# Patient Record
Sex: FEMALE | Race: WHITE | NOT HISPANIC OR LATINO
[De-identification: names, ages, dates, MRNs, and addresses within clinical notes are randomized per-mention and may not be internally consistent; named-entity substitution may affect disease eponyms.]

---

## 2022-03-23 ENCOUNTER — APPOINTMENT (OUTPATIENT)
Dept: GASTROENTEROLOGY | Facility: CLINIC | Age: 62
End: 2022-03-23

## 2022-04-11 PROBLEM — Z00.00 ENCOUNTER FOR PREVENTIVE HEALTH EXAMINATION: Status: ACTIVE | Noted: 2022-04-11

## 2022-04-12 ENCOUNTER — APPOINTMENT (OUTPATIENT)
Dept: ORTHOPEDIC SURGERY | Facility: CLINIC | Age: 62
End: 2022-04-12
Payer: COMMERCIAL

## 2022-04-12 VITALS
DIASTOLIC BLOOD PRESSURE: 97 MMHG | HEIGHT: 65 IN | WEIGHT: 210 LBS | HEART RATE: 93 BPM | SYSTOLIC BLOOD PRESSURE: 148 MMHG | BODY MASS INDEX: 34.99 KG/M2

## 2022-04-12 DIAGNOSIS — Z82.0 FAMILY HISTORY OF EPILEPSY AND OTHER DISEASES OF THE NERVOUS SYSTEM: ICD-10-CM

## 2022-04-12 DIAGNOSIS — Z86.79 PERSONAL HISTORY OF OTHER DISEASES OF THE CIRCULATORY SYSTEM: ICD-10-CM

## 2022-04-12 DIAGNOSIS — Z80.9 FAMILY HISTORY OF MALIGNANT NEOPLASM, UNSPECIFIED: ICD-10-CM

## 2022-04-12 DIAGNOSIS — Z87.891 PERSONAL HISTORY OF NICOTINE DEPENDENCE: ICD-10-CM

## 2022-04-12 DIAGNOSIS — Z78.9 OTHER SPECIFIED HEALTH STATUS: ICD-10-CM

## 2022-04-12 DIAGNOSIS — Z82.61 FAMILY HISTORY OF ARTHRITIS: ICD-10-CM

## 2022-04-12 PROCEDURE — 73502 X-RAY EXAM HIP UNI 2-3 VIEWS: CPT | Mod: RT

## 2022-04-12 PROCEDURE — 99205 OFFICE O/P NEW HI 60 MIN: CPT

## 2022-04-12 RX ORDER — DICLOFENAC SODIUM 0.1 %
0.1 DROPS OPHTHALMIC (EYE)
Refills: 0 | Status: ACTIVE | COMMUNITY

## 2022-04-12 RX ORDER — TOPIRAMATE 50 MG/1
TABLET, COATED ORAL
Refills: 0 | Status: ACTIVE | COMMUNITY

## 2022-04-12 RX ORDER — LOSARTAN POTASSIUM 100 MG/1
TABLET, FILM COATED ORAL
Refills: 0 | Status: ACTIVE | COMMUNITY

## 2022-04-12 RX ORDER — DICLOFENAC 35 MG/1
CAPSULE ORAL
Refills: 0 | Status: ACTIVE | COMMUNITY

## 2022-04-13 ENCOUNTER — OUTPATIENT (OUTPATIENT)
Dept: OUTPATIENT SERVICES | Facility: HOSPITAL | Age: 62
LOS: 1 days | End: 2022-04-13
Payer: COMMERCIAL

## 2022-04-13 ENCOUNTER — APPOINTMENT (OUTPATIENT)
Dept: ULTRASOUND IMAGING | Facility: CLINIC | Age: 62
End: 2022-04-13
Payer: COMMERCIAL

## 2022-04-13 DIAGNOSIS — T84.019A BROKEN INTERNAL JOINT PROSTHESIS, UNSPECIFIED SITE, INITIAL ENCOUNTER: ICD-10-CM

## 2022-04-13 DIAGNOSIS — T84.010A BROKEN INTERNAL RIGHT HIP PROSTHESIS, INITIAL ENCOUNTER: ICD-10-CM

## 2022-04-13 PROCEDURE — 76942 ECHO GUIDE FOR BIOPSY: CPT | Mod: 26

## 2022-04-13 PROCEDURE — 76942 ECHO GUIDE FOR BIOPSY: CPT

## 2022-04-19 ENCOUNTER — OUTPATIENT (OUTPATIENT)
Dept: OUTPATIENT SERVICES | Facility: HOSPITAL | Age: 62
LOS: 1 days | End: 2022-04-19
Payer: COMMERCIAL

## 2022-04-19 VITALS
HEART RATE: 77 BPM | RESPIRATION RATE: 20 BRPM | SYSTOLIC BLOOD PRESSURE: 133 MMHG | WEIGHT: 205.03 LBS | OXYGEN SATURATION: 97 % | HEIGHT: 62 IN | TEMPERATURE: 98 F | DIASTOLIC BLOOD PRESSURE: 93 MMHG

## 2022-04-19 DIAGNOSIS — Z29.9 ENCOUNTER FOR PROPHYLACTIC MEASURES, UNSPECIFIED: ICD-10-CM

## 2022-04-19 DIAGNOSIS — Z98.890 OTHER SPECIFIED POSTPROCEDURAL STATES: Chronic | ICD-10-CM

## 2022-04-19 DIAGNOSIS — Z98.891 HISTORY OF UTERINE SCAR FROM PREVIOUS SURGERY: Chronic | ICD-10-CM

## 2022-04-19 DIAGNOSIS — T84.010A BROKEN INTERNAL RIGHT HIP PROSTHESIS, INITIAL ENCOUNTER: ICD-10-CM

## 2022-04-19 DIAGNOSIS — Z01.818 ENCOUNTER FOR OTHER PREPROCEDURAL EXAMINATION: ICD-10-CM

## 2022-04-19 DIAGNOSIS — Z96.641 PRESENCE OF RIGHT ARTIFICIAL HIP JOINT: Chronic | ICD-10-CM

## 2022-04-19 LAB
A1C WITH ESTIMATED AVERAGE GLUCOSE RESULT: 5.6 % — SIGNIFICANT CHANGE UP (ref 4–5.6)
ALBUMIN SERPL ELPH-MCNC: 4.5 G/DL — SIGNIFICANT CHANGE UP (ref 3.3–5.2)
ALP SERPL-CCNC: 117 U/L — SIGNIFICANT CHANGE UP (ref 40–120)
ALT FLD-CCNC: 25 U/L — SIGNIFICANT CHANGE UP
ANION GAP SERPL CALC-SCNC: 17 MMOL/L — SIGNIFICANT CHANGE UP (ref 5–17)
APTT BLD: 27.9 SEC — SIGNIFICANT CHANGE UP (ref 27.5–35.5)
AST SERPL-CCNC: 20 U/L — SIGNIFICANT CHANGE UP
BASOPHILS # BLD AUTO: 0.06 K/UL — SIGNIFICANT CHANGE UP (ref 0–0.2)
BASOPHILS NFR BLD AUTO: 0.7 % — SIGNIFICANT CHANGE UP (ref 0–2)
BILIRUB SERPL-MCNC: 0.4 MG/DL — SIGNIFICANT CHANGE UP (ref 0.4–2)
BLD GP AB SCN SERPL QL: SIGNIFICANT CHANGE UP
BUN SERPL-MCNC: 27.9 MG/DL — HIGH (ref 8–20)
CALCIUM SERPL-MCNC: 9.6 MG/DL — SIGNIFICANT CHANGE UP (ref 8.6–10.2)
CHLORIDE SERPL-SCNC: 101 MMOL/L — SIGNIFICANT CHANGE UP (ref 98–107)
CO2 SERPL-SCNC: 20 MMOL/L — LOW (ref 22–29)
CREAT SERPL-MCNC: 0.78 MG/DL — SIGNIFICANT CHANGE UP (ref 0.5–1.3)
EGFR: 86 ML/MIN/1.73M2 — SIGNIFICANT CHANGE UP
EOSINOPHIL # BLD AUTO: 0.1 K/UL — SIGNIFICANT CHANGE UP (ref 0–0.5)
EOSINOPHIL NFR BLD AUTO: 1.2 % — SIGNIFICANT CHANGE UP (ref 0–6)
ESTIMATED AVERAGE GLUCOSE: 114 MG/DL — SIGNIFICANT CHANGE UP (ref 68–114)
GLUCOSE SERPL-MCNC: 106 MG/DL — HIGH (ref 70–99)
HCT VFR BLD CALC: 43.7 % — SIGNIFICANT CHANGE UP (ref 34.5–45)
HGB BLD-MCNC: 14.6 G/DL — SIGNIFICANT CHANGE UP (ref 11.5–15.5)
IMM GRANULOCYTES NFR BLD AUTO: 0.4 % — SIGNIFICANT CHANGE UP (ref 0–1.5)
INR BLD: 0.99 RATIO — SIGNIFICANT CHANGE UP (ref 0.88–1.16)
LYMPHOCYTES # BLD AUTO: 1.84 K/UL — SIGNIFICANT CHANGE UP (ref 1–3.3)
LYMPHOCYTES # BLD AUTO: 21.9 % — SIGNIFICANT CHANGE UP (ref 13–44)
MCHC RBC-ENTMCNC: 31.1 PG — SIGNIFICANT CHANGE UP (ref 27–34)
MCHC RBC-ENTMCNC: 33.4 GM/DL — SIGNIFICANT CHANGE UP (ref 32–36)
MCV RBC AUTO: 93 FL — SIGNIFICANT CHANGE UP (ref 80–100)
MONOCYTES # BLD AUTO: 0.84 K/UL — SIGNIFICANT CHANGE UP (ref 0–0.9)
MONOCYTES NFR BLD AUTO: 10 % — SIGNIFICANT CHANGE UP (ref 2–14)
MRSA PCR RESULT.: SIGNIFICANT CHANGE UP
NEUTROPHILS # BLD AUTO: 5.54 K/UL — SIGNIFICANT CHANGE UP (ref 1.8–7.4)
NEUTROPHILS NFR BLD AUTO: 65.8 % — SIGNIFICANT CHANGE UP (ref 43–77)
PLATELET # BLD AUTO: 464 K/UL — HIGH (ref 150–400)
POTASSIUM SERPL-MCNC: 4.4 MMOL/L — SIGNIFICANT CHANGE UP (ref 3.5–5.3)
POTASSIUM SERPL-SCNC: 4.4 MMOL/L — SIGNIFICANT CHANGE UP (ref 3.5–5.3)
PROT SERPL-MCNC: 7.4 G/DL — SIGNIFICANT CHANGE UP (ref 6.6–8.7)
PROTHROM AB SERPL-ACNC: 11.5 SEC — SIGNIFICANT CHANGE UP (ref 10.5–13.4)
RBC # BLD: 4.7 M/UL — SIGNIFICANT CHANGE UP (ref 3.8–5.2)
RBC # FLD: 12.5 % — SIGNIFICANT CHANGE UP (ref 10.3–14.5)
S AUREUS DNA NOSE QL NAA+PROBE: SIGNIFICANT CHANGE UP
SODIUM SERPL-SCNC: 137 MMOL/L — SIGNIFICANT CHANGE UP (ref 135–145)
WBC # BLD: 8.41 K/UL — SIGNIFICANT CHANGE UP (ref 3.8–10.5)
WBC # FLD AUTO: 8.41 K/UL — SIGNIFICANT CHANGE UP (ref 3.8–10.5)

## 2022-04-19 PROCEDURE — 86850 RBC ANTIBODY SCREEN: CPT

## 2022-04-19 PROCEDURE — 85610 PROTHROMBIN TIME: CPT

## 2022-04-19 PROCEDURE — 86900 BLOOD TYPING SEROLOGIC ABO: CPT

## 2022-04-19 PROCEDURE — 93005 ELECTROCARDIOGRAM TRACING: CPT

## 2022-04-19 PROCEDURE — 87641 MR-STAPH DNA AMP PROBE: CPT

## 2022-04-19 PROCEDURE — G0463: CPT

## 2022-04-19 PROCEDURE — 85025 COMPLETE CBC W/AUTO DIFF WBC: CPT

## 2022-04-19 PROCEDURE — 80053 COMPREHEN METABOLIC PANEL: CPT

## 2022-04-19 PROCEDURE — 85730 THROMBOPLASTIN TIME PARTIAL: CPT

## 2022-04-19 PROCEDURE — 36415 COLL VENOUS BLD VENIPUNCTURE: CPT

## 2022-04-19 PROCEDURE — 83036 HEMOGLOBIN GLYCOSYLATED A1C: CPT

## 2022-04-19 PROCEDURE — 86901 BLOOD TYPING SEROLOGIC RH(D): CPT

## 2022-04-19 PROCEDURE — 93010 ELECTROCARDIOGRAM REPORT: CPT

## 2022-04-19 PROCEDURE — 87640 STAPH A DNA AMP PROBE: CPT

## 2022-04-19 NOTE — H&P PST ADULT - ANESTHESIA, PREVIOUS REACTION, PROFILE
reports mother also had n/v/nausea/vomiting reports mother also had n/v after anesthesia/nausea/vomiting

## 2022-04-19 NOTE — H&P PST ADULT - PROBLEM SELECTOR PLAN 3
preop assessment, medical clearance pending, revision right total hip replacement w/Dr Byrnes scheduled for 5/2/2022

## 2022-04-19 NOTE — ADDENDUM
[FreeTextEntry1] : This note was authored by Ruperto Merino working as a medical scribe for Dr. Johan Byrnes. The note was reviewed, edited, and revised by Dr. Johan Byrnes whom is in agreement with the exam findings, imaging findings, and treatment plan. 04/12/2022		\par \par Patient will  nee a wheelchair to improve ability to participate in [facility] and must use this on a regular basis at home. The patients mobility limitation cannot be sufficiently resolved using an appropriately fitted cane or waker.

## 2022-04-19 NOTE — H&P PST ADULT - PROBLEM SELECTOR PLAN 2
preop assessment, medical clearance pending, revision right total hip replacement w/Dr Byrnes scheduled for 5/2/2022. caprini score 8, high risk for dvt, SCD ordered, surgical team to assess for dvt prophylaxis

## 2022-04-19 NOTE — H&P PST ADULT - NSICDXPASTMEDICALHX_GEN_ALL_CORE_FT
PAST MEDICAL HISTORY:  Broken internal right hip prosthesis, initial encounter     Failure of right total hip arthroplasty, initial encounter     Hypertension     OA (osteoarthritis)      PAST MEDICAL HISTORY:  Broken internal right hip prosthesis, initial encounter     Failure of right total hip arthroplasty, initial encounter     Hypertension     Mild asthma     OA (osteoarthritis)     JOSE LUIS (obstructive sleep apnea)

## 2022-04-19 NOTE — H&P PST ADULT - NSICDXPASTSURGICALHX_GEN_ALL_CORE_FT
PAST SURGICAL HISTORY:  H/O  section     History of bunionectomy     History of total right hip replacement 2017 at [facility]    S/P excision of lipoma      PAST SURGICAL HISTORY:  H/O  section     History of bunionectomy right foot     History of total right hip replacement 2017 at [facility]    S/P excision of lipoma [id number]

## 2022-04-19 NOTE — H&P PST ADULT - MUSCULOSKELETAL
right hip/decreased ROM due to pain details… detailed exam right hip/no joint erythema/no joint warmth/no calf tenderness/decreased ROM due to pain

## 2022-04-19 NOTE — H&P PST ADULT - HISTORY OF PRESENT ILLNESS
60 yo F PMH of HTN, OA s/p right THR in 2017 at [facility] presents with c/o debilitating right hip pain for >3 weeks. She denies any pain prior to this. Patient denies any falls or trauma. She denies any out of the ordinary activity. Patient denies any fevers or chills. Approximately 3 weeks ago her pain was severe to the point of going to the hospital where CT and x-ray were performed. She was told to follow-up with an outpatient orthopedist who she saw and had blood work which was negative for infection. That orthopedist sent her for a ultrasound-guided psoas injection 1 week ago without significant improvement. Her hip pain is localized to the groin and she has been unable to walk since onset of pain. She is currently in a wheelchair. Imaging: X-ray AP of the pelvis and 2 views of the right hip demonstrate a right total hip arthroplasty with a fracture of the stem proximally at the proximal aspect of the body of the femoral component. Preop assessment prior to revision right total hip replacement w/Dr Byrnes scheduled for 2022.       Surgical History  History of Bunionectomy  History of  Section Low Transverse  History of Lipoma excision    Family History  Family history of arthritis (V17.7) (Z82.61) : Father  Family history of epilepsy (V17.2) (Z82.0) : Father  Family history of malignant neoplasm (V16.9) (Z80.9) : Mother     60 yo F PMH of HTN, JOSE LUIS (not on CPAP), mild asthma, obesity (BMI 37.5), OA s/p right THR in 2017 at [facility] presents with c/o debilitating right hip pain for >3 weeks. She denies any pain prior to this. Patient denies any falls or trauma. She denies any out of the ordinary activity. Patient denies any fevers or chills. Approximately 3 weeks ago her pain was severe to the point of going to the hospital where CT and x-ray were performed. She was told to follow-up with an outpatient orthopedist who she saw and had blood work which was negative for infection. That orthopedist sent her for a ultrasound-guided psoas injection 1 week ago without significant improvement. Her hip pain is localized to the groin and she has been unable to walk since onset of pain. She is currently in a wheelchair. Imaging: X-ray AP of the pelvis and 2 views of the right hip demonstrate a right total hip arthroplasty with a fracture of the stem proximally at the proximal aspect of the body of the femoral component. Preop assessment prior to revision right total hip replacement w/Dr Byrnes scheduled for 5/2/2022     62 yo F PMH of HTN, JOSE LUIS (not on CPAP), mild asthma, obesity (BMI 37.5), OA s/p right THR in 2017 at \[facility]"" presents with c/o debilitating right hip pain for >3 weeks. She denies any pain prior to this. Patient denies any falls, trauma, or any out of the ordinary activity. Patient denies any fevers or chills. Approximately 3 weeks ago her pain was severe to the point of going to the hospital where CT and x-ray were performed. She was told to follow-up with an outpatient orthopedist who she saw and had blood work which was negative for infection. That orthopedist sent her for a ultrasound-guided psoas injection 1 week ago without significant improvement. Her hip pain is localized to the groin and she has been unable to walk since onset of pain. She is currently in a wheelchair. Imaging: X-ray AP of the pelvis and 2 views of the right hip demonstrate a right total hip arthroplasty with a fracture of the stem proximally at the proximal aspect of the body of the femoral component. Preop assessment prior to revision right total hip replacement w/Dr Byrnes scheduled for 5/2/2022

## 2022-04-19 NOTE — H&P PST ADULT - ASSESSMENT
60 yo F s/p right THR in 2017 presents with c/o severe pain >3 weeks due to a fracture of the stem. Preop assessment prior to revision right total hip replacement w/Dr Byrnes scheduled for 2022. Patient will remain in a wheelchair prior to surgery and limit weightbearing to only toe-touch weightbearing with crutches or a walker when transferring from the wheelchair.     Pt was educated on preop preparation with written and verbal instructions. Pt was informed to obtain clearances >3 days before surgery. Pt will review medications with PCP. Pt was educated on NSAIDs, multivitamins and herbals that increase the risk of bleeding and need to be stopped 7 days before procedure. Pt was educated on covid testing and covid prevention, i.e. social distancing, handwashing, mask wearing. Pt verbalized understanding of the above.     OPIOID RISK TOOL    LETICIA EACH BOX THAT APPLIES AND ADD TOTALS AT THE END    FAMILY HISTORY OF SUBSTANCE ABUSE                 FEMALE         MALE                                                Alcohol                             [  ]1 pt          [  ]3pts                                               Illegal Durgs                     [  ]2 pts        [  ]3pts                                               Rx Drugs                           [  ]4 pts        [  ]4 pts    PERSONAL HISTORY OF SUBSTANCE ABUSE                                                                                          Alcohol                             [  ]3 pts       [  ]3 pts                                               Illegal Drugs                     [  ]4 pts        [  ]4 pts                                               Rx Drugs                           [  ]5 pts        [  ]5 pts    AGE BETWEEN 16-45 YEARS                                      [  ]1 pt         [  ]1 pt    HISTORY OF PREADOLESCENT   SEXUAL ABUSE                                                             [  ]3 pts        [  ]0pts    PSYCHOLOGICAL DISEASE                     ADD, OCD, Bipolar, Schizophrenia        [  ]2 pts         [  ]2 pts                      Depression                                               [  ]1 pt           [  ]1 pt           SCORING TOTAL   (add numbers and type here)              ( 0 )                                     A score of 3 or lower indicated LOW risk for future opioid abuse  A score of 4 to 7 indicated moderate risk for future opioid abuse  A score of 8 or higher indicates a high risk for opioid abuse    HEIDII VTE 2.0 SCORE [CLOT updated 2019]    AGE RELATED RISK FACTORS                                                       MOBILITY RELATED FACTORS  [ ] Age 41-60 years                                            (1 Point)                    [ ] Bed rest                                                        (1 Point)  [x ] Age: 61-74 years                                           (2 Points)                  [ ] Plaster cast                                                   (2 Points)  [ ] Age= 75 years                                              (3 Points)                    [ ] Bed bound for more than 72 hours                 (2 Points)    DISEASE RELATED RISK FACTORS                                               GENDER SPECIFIC FACTORS  [ ] Edema in the lower extremities                       (1 Point)              [ ] Pregnancy                                                     (1 Point)  [ ] Varicose veins                                               (1 Point)                     [ ] Post-partum < 6 weeks                                   (1 Point)             [ x] BMI > 25 Kg/m2                                            (1 Point)                     [ ] Hormonal therapy  or oral contraception          (1 Point)                 [ ] Sepsis (in the previous month)                        (1 Point)               [ ] History of pregnancy complications                 (1 point)  [ ] Pneumonia or serious lung disease                                               [ ] Unexplained or recurrent                     (1 Point)           (in the previous month)                               (1 Point)  [ ] Abnormal pulmonary function test                     (1 Point)                 SURGERY RELATED RISK FACTORS  [ ] Acute myocardial infarction                              (1 Point)               [ ]  Section                                             (1 Point)  [ ] Congestive heart failure (in the previous month)  (1 Point)      [ ] Minor surgery                                                  (1 Point)   [ ] Inflammatory bowel disease                             (1 Point)               [ ] Arthroscopic surgery                                        (2 Points)  [ ] Central venous access                                      (2 Points)                [ ] General surgery lasting more than 45 minutes (2 points)  [ ] Malignancy- Present or previous                   (2 Points)                [x ] Elective arthroplasty                                         (5 points)    [ ] Stroke (in the previous month)                          (5 Points)                                                                                                                                                           HEMATOLOGY RELATED FACTORS                                                 TRAUMA RELATED RISK FACTORS  [ ] Prior episodes of VTE                                     (3 Points)                [ ] Fracture of the hip, pelvis, or leg                       (5 Points)  [ ] Positive family history for VTE                         (3 Points)             [ ] Acute spinal cord injury (in the previous month)  (5 Points)  [ ] Prothrombin 21129 A                                     (3 Points)               [ ] Paralysis  (less than 1 month)                             (5 Points)  [ ] Factor V Leiden                                             (3 Points)                  [ ] Multiple Trauma within 1 month                        (5 Points)  [ ] Lupus anticoagulants                                     (3 Points)                                                           [ ] Anticardiolipin antibodies                               (3 Points)                                                       [ ] High homocysteine in the blood                      (3 Points)                                             [ ] Other congenital or acquired thrombophilia      (3 Points)                                                [ ] Heparin induced thrombocytopenia                  (3 Points)                                     Total Score [   8       ] 60 yo F s/p right THR in 2017 presents with c/o severe pain >3 weeks due to a fracture of the stem. Preop assessment prior to revision right total hip replacement w/Dr Byrnes scheduled for 2022. Patient will remain in a wheelchair prior to surgery and limit weightbearing to only toe-touch weightbearing with crutches or a walker when transferring from the wheelchair.     Pt was educated on preop preparation with written and verbal instructions. Pt was informed to obtain clearance >3 days before surgery. Pt was educated on NSAIDs, multivitamins and herbals that increase the risk of bleeding and need to be stopped 7 days before procedure. Pt was educated on covid testing and covid prevention, i.e. social distancing, handwashing, mask wearing. Pt verbalized understanding of the above.     OPIOID RISK TOOL    LETICIA EACH BOX THAT APPLIES AND ADD TOTALS AT THE END    FAMILY HISTORY OF SUBSTANCE ABUSE                 FEMALE         MALE                                                Alcohol                             [  ]1 pt          [  ]3pts                                               Illegal Durgs                     [  ]2 pts        [  ]3pts                                               Rx Drugs                           [  ]4 pts        [  ]4 pts    PERSONAL HISTORY OF SUBSTANCE ABUSE                                                                                          Alcohol                             [  ]3 pts       [  ]3 pts                                               Illegal Drugs                     [  ]4 pts        [  ]4 pts                                               Rx Drugs                           [  ]5 pts        [  ]5 pts    AGE BETWEEN 16-45 YEARS                                      [  ]1 pt         [  ]1 pt    HISTORY OF PREADOLESCENT   SEXUAL ABUSE                                                             [  ]3 pts        [  ]0pts    PSYCHOLOGICAL DISEASE                     ADD, OCD, Bipolar, Schizophrenia        [  ]2 pts         [  ]2 pts                      Depression                                               [  ]1 pt           [  ]1 pt           SCORING TOTAL   (add numbers and type here)              ( 0 )                                     A score of 3 or lower indicated LOW risk for future opioid abuse  A score of 4 to 7 indicated moderate risk for future opioid abuse  A score of 8 or higher indicates a high risk for opioid abuse    HEIDII VTE 2.0 SCORE [CLOT updated 2019]    AGE RELATED RISK FACTORS                                                       MOBILITY RELATED FACTORS  [ ] Age 41-60 years                                            (1 Point)                    [ ] Bed rest                                                        (1 Point)  [x ] Age: 61-74 years                                           (2 Points)                  [ ] Plaster cast                                                   (2 Points)  [ ] Age= 75 years                                              (3 Points)                    [ ] Bed bound for more than 72 hours                 (2 Points)    DISEASE RELATED RISK FACTORS                                               GENDER SPECIFIC FACTORS  [ ] Edema in the lower extremities                       (1 Point)              [ ] Pregnancy                                                     (1 Point)  [ ] Varicose veins                                               (1 Point)                     [ ] Post-partum < 6 weeks                                   (1 Point)             [ x] BMI > 25 Kg/m2                                            (1 Point)                     [ ] Hormonal therapy  or oral contraception          (1 Point)                 [ ] Sepsis (in the previous month)                        (1 Point)               [ ] History of pregnancy complications                 (1 point)  [ ] Pneumonia or serious lung disease                                               [ ] Unexplained or recurrent                     (1 Point)           (in the previous month)                               (1 Point)  [ ] Abnormal pulmonary function test                     (1 Point)                 SURGERY RELATED RISK FACTORS  [ ] Acute myocardial infarction                              (1 Point)               [ ]  Section                                             (1 Point)  [ ] Congestive heart failure (in the previous month)  (1 Point)      [ ] Minor surgery                                                  (1 Point)   [ ] Inflammatory bowel disease                             (1 Point)               [ ] Arthroscopic surgery                                        (2 Points)  [ ] Central venous access                                      (2 Points)                [ ] General surgery lasting more than 45 minutes (2 points)  [ ] Malignancy- Present or previous                   (2 Points)                [x ] Elective arthroplasty                                         (5 points)    [ ] Stroke (in the previous month)                          (5 Points)                                                                                                                                                           HEMATOLOGY RELATED FACTORS                                                 TRAUMA RELATED RISK FACTORS  [ ] Prior episodes of VTE                                     (3 Points)                [ ] Fracture of the hip, pelvis, or leg                       (5 Points)  [ ] Positive family history for VTE                         (3 Points)             [ ] Acute spinal cord injury (in the previous month)  (5 Points)  [ ] Prothrombin 83695 A                                     (3 Points)               [ ] Paralysis  (less than 1 month)                             (5 Points)  [ ] Factor V Leiden                                             (3 Points)                  [ ] Multiple Trauma within 1 month                        (5 Points)  [ ] Lupus anticoagulants                                     (3 Points)                                                           [ ] Anticardiolipin antibodies                               (3 Points)                                                       [ ] High homocysteine in the blood                      (3 Points)                                             [ ] Other congenital or acquired thrombophilia      (3 Points)                                                [ ] Heparin induced thrombocytopenia                  (3 Points)                                     Total Score [   8       ]

## 2022-04-19 NOTE — H&P PST ADULT - ENDOCRINE
Duration Of Freeze Thaw-Cycle (Seconds): 0
Post-Care Instructions: I reviewed with the patient in detail post-care instructions. Patient is to wear sunprotection, and avoid picking at any of the treated lesions. Pt may apply Vaseline to crusted or scabbing areas.
Detail Level: Zone
Render Post-Care Instructions In Note?: no
Consent: The patient's consent was obtained including but not limited to risks of crusting, scabbing, blistering, scarring, darker or lighter pigmentary change, recurrence, incomplete removal and infection.
negative

## 2022-04-19 NOTE — H&P PST ADULT - PROBLEM SELECTOR PLAN 1
caprini score 8, high risk for dvt, SCD ordered, surgical team to assess for dvt prophylaxis preop assessment, medical clearance pending, revision right total hip replacement w/Dr Byrnes scheduled for 5/2/2022

## 2022-04-19 NOTE — HISTORY OF PRESENT ILLNESS
[de-identified] : Ms. SUSY CHOUDHURY is a 61 year old female presenting for evaluation of 18 days of debilitating right hip pain, status post right hip replacement in 2017 with Dr El at [facility].  She denies any pain prior to this.  Patient denies any falls or trauma.  She denies any out of the ordinary activity.  Patient denies any fevers or chills.  Approximately 18 days ago pain was severe to the point of going to the hospital where CT and x-ray were performed.  She was told to follow-up with an outpatient orthopedist who she saw and had blood work which was negative for infection.  That orthopedist sent her for a ultrasound-guided psoas injection 1 week ago without significant improvement.  Her hip pain is localized to the groin and she has been unable to walk since onset of pain.  She is currently in a wheelchair.\par Patient denies any significant medical history other than hypertension.    On 3/31 esr=14 crp=1.2

## 2022-04-19 NOTE — H&P PST ADULT - NSICDXFAMILYHX_GEN_ALL_CORE_FT
FAMILY HISTORY:  Father  Still living? Unknown  FH: arthritis, Age at diagnosis: Age Unknown  FH: epilepsy, Age at diagnosis: Age Unknown    Mother  Still living? Unknown  FHx: malignant neoplasm, Age at diagnosis: Age Unknown     FAMILY HISTORY:  Father  Still living? Unknown  FH: arthritis, Age at diagnosis: Age Unknown  FH: epilepsy, Age at diagnosis: Age Unknown    Mother  Still living? Unknown  FH: hypertension, Age at diagnosis: Age Unknown  FHx: malignant neoplasm, Age at diagnosis: Age Unknown    Grandparent  Still living? Unknown  FH: diabetes mellitus, Age at diagnosis: Age Unknown

## 2022-04-19 NOTE — PHYSICAL EXAM
[de-identified] : The patient appears well nourished  and in no apparent distress.  The patient is alert and oriented to person, place, and time.   Affect and mood appear normal. The head is normocephalic and atraumatic.  The eyes reveal normal sclera and extra ocular muscles are intact. The tongue is midline with no apparent lesions.  Skin shows normal turgor with no evidence of eczema or psoriasis.  No respiratory distress noted.  Sensation grossly intact.		  [de-identified] : Exam of the right hip shows a healed incision with no signs of infection. Range of motion was not assessed.\par 5/5 motor strength bilaterally distally. Sensation intact distally.		  [de-identified] :  X-ray: AP of the pelvis and 2 views of the right hip demonstrate a right total hip arthroplasty with a fracture of the stem proximally at the proximal aspect of the body of the femoral component.

## 2022-04-19 NOTE — DISCUSSION/SUMMARY
[de-identified] : SUSY CHOUDHURY is a 61 year female who presents s/p right THR with a broken stem.  The patient is indicated for revision right total hip replacement.  It appears that she has a modular femoral component and the body fracture.  One option would be to replace the body and maintain the distal stem which is well fixed or alternatively revise the entire femoral component.  This will be determined intraoperatively.  We will obtain her prior operative report.  Given that her CRP was slightly elevated I am going to have her hip aspirated preoperatively to rule out infection.  She also is at elevated risk of postoperative complication because of her elevated BMI.  At this point however due to the fractured stem surgery is indicated and cannot be postponed for weight optimization.\par \par  A discussion was had with the patient regarding a right revision total hip replacement. Anterior and posterior exposures were discussed. For this patient a posterior approach is appropriate. A long discussion was had with the patient as what the total joint replacement would entail. A model was used to demonstrate the operation and to discuss bearing surfaces of the implants. The hospitalization and rehabilitation were discussed. The use of perioperative antibiotics and DVT prophylaxis were discussed. The risks, benefits and alternatives to surgical intervention were discussed at length with the patient. Specific risks discussed included: infection, wound breakdown, numbness and damage to nerves, tendon, muscle, arteries or other blood vessels, and the possibility of leg length discrepancy. The possibility of recurrent pain, no improvement in pain and actual worsening of the pain were also mentioned in conversation with the patient. Medical complications related to the patient's general medical health including deep vein thrombosis, pulmonary embolism, heart attack, stroke, death and other complications from anesthesia were discussed as well. The patient was told that we will take steps to minimize these risks by using sterile technique, antibiotics and DVT prophylaxis when appropriate and following the patient postoperatively in the clinic setting to monitor progress. The benefits of surgery were discussed with the patient including the potential to improve the current clinical condition through operative intervention. Alternatives to surgical intervention include continued conservative management which may yield less than optimal results in this particular patient. All questions were answered to the satisfaction of the patient. Models were used as an educational tool. We did discuss implant choices and fixation, with shared decision making with the patient.		\par \par The patient will remain in a wheelchair prior to surgery and limit weightbearing to only toe-touch weightbearing with crutches or a walker when transferring from the wheelchair.  We will try to expedite scheduling surgery but we do need to wait until the aspiration results are completed to ensure no infection.

## 2022-05-01 ENCOUNTER — TRANSCRIPTION ENCOUNTER (OUTPATIENT)
Age: 62
End: 2022-05-01

## 2022-05-02 ENCOUNTER — RESULT REVIEW (OUTPATIENT)
Age: 62
End: 2022-05-02

## 2022-05-02 ENCOUNTER — INPATIENT (INPATIENT)
Facility: HOSPITAL | Age: 62
LOS: 1 days | Discharge: NURSING, REHAB OR LONG-TERM CARE | DRG: 467 | End: 2022-05-04
Attending: ORTHOPAEDIC SURGERY | Admitting: ORTHOPAEDIC SURGERY
Payer: COMMERCIAL

## 2022-05-02 ENCOUNTER — TRANSCRIPTION ENCOUNTER (OUTPATIENT)
Age: 62
End: 2022-05-02

## 2022-05-02 ENCOUNTER — APPOINTMENT (OUTPATIENT)
Dept: ORTHOPEDIC SURGERY | Facility: HOSPITAL | Age: 62
End: 2022-05-02

## 2022-05-02 VITALS
TEMPERATURE: 99 F | SYSTOLIC BLOOD PRESSURE: 129 MMHG | RESPIRATION RATE: 16 BRPM | HEIGHT: 62.01 IN | OXYGEN SATURATION: 100 % | HEART RATE: 85 BPM | WEIGHT: 205.03 LBS | DIASTOLIC BLOOD PRESSURE: 81 MMHG

## 2022-05-02 DIAGNOSIS — Z98.890 OTHER SPECIFIED POSTPROCEDURAL STATES: Chronic | ICD-10-CM

## 2022-05-02 DIAGNOSIS — T84.010A BROKEN INTERNAL RIGHT HIP PROSTHESIS, INITIAL ENCOUNTER: ICD-10-CM

## 2022-05-02 DIAGNOSIS — Z98.891 HISTORY OF UTERINE SCAR FROM PREVIOUS SURGERY: Chronic | ICD-10-CM

## 2022-05-02 DIAGNOSIS — Z96.641 PRESENCE OF RIGHT ARTIFICIAL HIP JOINT: Chronic | ICD-10-CM

## 2022-05-02 LAB
ABO RH CONFIRMATION: SIGNIFICANT CHANGE UP
GLUCOSE BLDC GLUCOMTR-MCNC: 154 MG/DL — HIGH (ref 70–99)
GLUCOSE BLDC GLUCOMTR-MCNC: 82 MG/DL — SIGNIFICANT CHANGE UP (ref 70–99)

## 2022-05-02 PROCEDURE — 88300 SURGICAL PATH GROSS: CPT | Mod: 26

## 2022-05-02 PROCEDURE — 73502 X-RAY EXAM HIP UNI 2-3 VIEWS: CPT | Mod: 26,RT,76

## 2022-05-02 PROCEDURE — 27134 REVISE HIP JOINT REPLACEMENT: CPT | Mod: AS,RT

## 2022-05-02 PROCEDURE — 27134 REVISE HIP JOINT REPLACEMENT: CPT | Mod: RT

## 2022-05-02 DEVICE — CABLE/SLV SET BEAD MED CABLE 2MM: Type: IMPLANTABLE DEVICE | Status: FUNCTIONAL

## 2022-05-02 DEVICE — HEAD FEM CER V40 36MM  PLUS 0MM: Type: IMPLANTABLE DEVICE | Status: FUNCTIONAL

## 2022-05-02 DEVICE — GUIDEWIRE RD REAMING W/BALL TIP 2.5: Type: IMPLANTABLE DEVICE | Status: FUNCTIONAL

## 2022-05-02 DEVICE — FEMORAL BONE CEMENT KIT: Type: IMPLANTABLE DEVICE | Status: FUNCTIONAL

## 2022-05-02 DEVICE — IMPLANTABLE DEVICE: Type: IMPLANTABLE DEVICE | Status: FUNCTIONAL

## 2022-05-02 DEVICE — LINER ACET TRIDENT X3 0 DEG 36MM D: Type: IMPLANTABLE DEVICE | Status: FUNCTIONAL

## 2022-05-02 DEVICE — WAX MONTAGE 5CC STRL: Type: IMPLANTABLE DEVICE | Status: FUNCTIONAL

## 2022-05-02 DEVICE — HIP STEM MODULAR 21MM PLUS 0MM CONE: Type: IMPLANTABLE DEVICE | Status: FUNCTIONAL

## 2022-05-02 DEVICE — BONE WAX: Type: IMPLANTABLE DEVICE | Status: FUNCTIONAL

## 2022-05-02 DEVICE — SLEEVE UNIV V40  PLUS  4MM ADPTR: Type: IMPLANTABLE DEVICE | Status: FUNCTIONAL

## 2022-05-02 RX ORDER — CEFAZOLIN SODIUM 1 G
2000 VIAL (EA) INJECTION
Refills: 0 | Status: COMPLETED | OUTPATIENT
Start: 2022-05-02 | End: 2022-05-03

## 2022-05-02 RX ORDER — HYDROMORPHONE HYDROCHLORIDE 2 MG/ML
0.5 INJECTION INTRAMUSCULAR; INTRAVENOUS; SUBCUTANEOUS
Refills: 0 | Status: DISCONTINUED | OUTPATIENT
Start: 2022-05-02 | End: 2022-05-04

## 2022-05-02 RX ORDER — SODIUM CHLORIDE 9 MG/ML
3 INJECTION INTRAMUSCULAR; INTRAVENOUS; SUBCUTANEOUS EVERY 8 HOURS
Refills: 0 | Status: DISCONTINUED | OUTPATIENT
Start: 2022-05-02 | End: 2022-05-02

## 2022-05-02 RX ORDER — SODIUM CHLORIDE 9 MG/ML
1000 INJECTION INTRAMUSCULAR; INTRAVENOUS; SUBCUTANEOUS
Refills: 0 | Status: DISCONTINUED | OUTPATIENT
Start: 2022-05-02 | End: 2022-05-04

## 2022-05-02 RX ORDER — HYDROCHLOROTHIAZIDE 25 MG
12.5 TABLET ORAL DAILY
Refills: 0 | Status: DISCONTINUED | OUTPATIENT
Start: 2022-05-04 | End: 2022-05-04

## 2022-05-02 RX ORDER — TRANEXAMIC ACID 100 MG/ML
1000 INJECTION, SOLUTION INTRAVENOUS ONCE
Refills: 0 | Status: DISCONTINUED | OUTPATIENT
Start: 2022-05-02 | End: 2022-05-02

## 2022-05-02 RX ORDER — SODIUM CHLORIDE 9 MG/ML
500 INJECTION INTRAMUSCULAR; INTRAVENOUS; SUBCUTANEOUS ONCE
Refills: 0 | Status: COMPLETED | OUTPATIENT
Start: 2022-05-02 | End: 2022-05-02

## 2022-05-02 RX ORDER — ACETAMINOPHEN 500 MG
975 TABLET ORAL ONCE
Refills: 0 | Status: COMPLETED | OUTPATIENT
Start: 2022-05-02 | End: 2022-05-02

## 2022-05-02 RX ORDER — ONDANSETRON 8 MG/1
4 TABLET, FILM COATED ORAL ONCE
Refills: 0 | Status: COMPLETED | OUTPATIENT
Start: 2022-05-02 | End: 2022-05-02

## 2022-05-02 RX ORDER — CELECOXIB 200 MG/1
200 CAPSULE ORAL EVERY 12 HOURS
Refills: 0 | Status: DISCONTINUED | OUTPATIENT
Start: 2022-05-04 | End: 2022-05-04

## 2022-05-02 RX ORDER — SENNA PLUS 8.6 MG/1
2 TABLET ORAL AT BEDTIME
Refills: 0 | Status: DISCONTINUED | OUTPATIENT
Start: 2022-05-02 | End: 2022-05-04

## 2022-05-02 RX ORDER — FENTANYL CITRATE 50 UG/ML
25 INJECTION INTRAVENOUS
Refills: 0 | Status: DISCONTINUED | OUTPATIENT
Start: 2022-05-02 | End: 2022-05-02

## 2022-05-02 RX ORDER — SODIUM CHLORIDE 9 MG/ML
1000 INJECTION, SOLUTION INTRAVENOUS
Refills: 0 | Status: DISCONTINUED | OUTPATIENT
Start: 2022-05-02 | End: 2022-05-02

## 2022-05-02 RX ORDER — OXYCODONE HYDROCHLORIDE 5 MG/1
5 TABLET ORAL
Refills: 0 | Status: DISCONTINUED | OUTPATIENT
Start: 2022-05-02 | End: 2022-05-03

## 2022-05-02 RX ORDER — CELECOXIB 200 MG/1
400 CAPSULE ORAL ONCE
Refills: 0 | Status: COMPLETED | OUTPATIENT
Start: 2022-05-02 | End: 2022-05-02

## 2022-05-02 RX ORDER — HYDROMORPHONE HYDROCHLORIDE 2 MG/ML
4 INJECTION INTRAMUSCULAR; INTRAVENOUS; SUBCUTANEOUS
Refills: 0 | Status: DISCONTINUED | OUTPATIENT
Start: 2022-05-02 | End: 2022-05-04

## 2022-05-02 RX ORDER — ONDANSETRON 8 MG/1
4 TABLET, FILM COATED ORAL EVERY 6 HOURS
Refills: 0 | Status: DISCONTINUED | OUTPATIENT
Start: 2022-05-02 | End: 2022-05-04

## 2022-05-02 RX ORDER — OXYCODONE HYDROCHLORIDE 5 MG/1
10 TABLET ORAL
Refills: 0 | Status: DISCONTINUED | OUTPATIENT
Start: 2022-05-02 | End: 2022-05-03

## 2022-05-02 RX ORDER — KETOROLAC TROMETHAMINE 30 MG/ML
15 SYRINGE (ML) INJECTION EVERY 6 HOURS
Refills: 0 | Status: DISCONTINUED | OUTPATIENT
Start: 2022-05-02 | End: 2022-05-03

## 2022-05-02 RX ORDER — LOSARTAN POTASSIUM 100 MG/1
50 TABLET, FILM COATED ORAL DAILY
Refills: 0 | Status: DISCONTINUED | OUTPATIENT
Start: 2022-05-04 | End: 2022-05-04

## 2022-05-02 RX ORDER — MAGNESIUM HYDROXIDE 400 MG/1
30 TABLET, CHEWABLE ORAL DAILY
Refills: 0 | Status: DISCONTINUED | OUTPATIENT
Start: 2022-05-02 | End: 2022-05-04

## 2022-05-02 RX ORDER — PANTOPRAZOLE SODIUM 20 MG/1
40 TABLET, DELAYED RELEASE ORAL
Refills: 0 | Status: DISCONTINUED | OUTPATIENT
Start: 2022-05-02 | End: 2022-05-04

## 2022-05-02 RX ORDER — POLYETHYLENE GLYCOL 3350 17 G/17G
17 POWDER, FOR SOLUTION ORAL AT BEDTIME
Refills: 0 | Status: DISCONTINUED | OUTPATIENT
Start: 2022-05-02 | End: 2022-05-04

## 2022-05-02 RX ORDER — APREPITANT 80 MG/1
40 CAPSULE ORAL ONCE
Refills: 0 | Status: COMPLETED | OUTPATIENT
Start: 2022-05-02 | End: 2022-05-02

## 2022-05-02 RX ORDER — CEFAZOLIN SODIUM 1 G
2000 VIAL (EA) INJECTION ONCE
Refills: 0 | Status: DISCONTINUED | OUTPATIENT
Start: 2022-05-02 | End: 2022-05-02

## 2022-05-02 RX ORDER — APIXABAN 2.5 MG/1
2.5 TABLET, FILM COATED ORAL
Refills: 0 | Status: DISCONTINUED | OUTPATIENT
Start: 2022-05-03 | End: 2022-05-04

## 2022-05-02 RX ORDER — ACETAMINOPHEN 500 MG
975 TABLET ORAL EVERY 8 HOURS
Refills: 0 | Status: DISCONTINUED | OUTPATIENT
Start: 2022-05-02 | End: 2022-05-04

## 2022-05-02 RX ADMIN — CELECOXIB 400 MILLIGRAM(S): 200 CAPSULE ORAL at 11:08

## 2022-05-02 RX ADMIN — Medication 975 MILLIGRAM(S): at 22:03

## 2022-05-02 RX ADMIN — FENTANYL CITRATE 25 MICROGRAM(S): 50 INJECTION INTRAVENOUS at 20:46

## 2022-05-02 RX ADMIN — SODIUM CHLORIDE 75 MILLILITER(S): 9 INJECTION, SOLUTION INTRAVENOUS at 21:44

## 2022-05-02 RX ADMIN — Medication 975 MILLIGRAM(S): at 11:08

## 2022-05-02 RX ADMIN — Medication 15 MILLIGRAM(S): at 23:10

## 2022-05-02 RX ADMIN — SODIUM CHLORIDE 150 MILLILITER(S): 9 INJECTION INTRAMUSCULAR; INTRAVENOUS; SUBCUTANEOUS at 22:03

## 2022-05-02 RX ADMIN — FENTANYL CITRATE 25 MICROGRAM(S): 50 INJECTION INTRAVENOUS at 20:44

## 2022-05-02 RX ADMIN — Medication 975 MILLIGRAM(S): at 22:07

## 2022-05-02 RX ADMIN — ONDANSETRON 4 MILLIGRAM(S): 8 TABLET, FILM COATED ORAL at 20:45

## 2022-05-02 RX ADMIN — FENTANYL CITRATE 25 MICROGRAM(S): 50 INJECTION INTRAVENOUS at 20:22

## 2022-05-02 RX ADMIN — APREPITANT 40 MILLIGRAM(S): 80 CAPSULE ORAL at 11:07

## 2022-05-02 RX ADMIN — SODIUM CHLORIDE 500 MILLILITER(S): 9 INJECTION INTRAMUSCULAR; INTRAVENOUS; SUBCUTANEOUS at 21:44

## 2022-05-02 RX ADMIN — Medication 100 MILLIGRAM(S): at 20:18

## 2022-05-02 RX ADMIN — Medication 15 MILLIGRAM(S): at 23:22

## 2022-05-02 RX ADMIN — SENNA PLUS 2 TABLET(S): 8.6 TABLET ORAL at 22:07

## 2022-05-02 RX ADMIN — POLYETHYLENE GLYCOL 3350 17 GRAM(S): 17 POWDER, FOR SOLUTION ORAL at 22:07

## 2022-05-02 RX ADMIN — FENTANYL CITRATE 25 MICROGRAM(S): 50 INJECTION INTRAVENOUS at 21:21

## 2022-05-02 NOTE — BRIEF OPERATIVE NOTE - NSICDXBRIEFPREOP_GEN_ALL_CORE_FT
PRE-OP DIAGNOSIS:  Broken internal right hip prosthesis, initial encounter 02-May-2022 19:10:35  Reyes Lam

## 2022-05-02 NOTE — DISCHARGE NOTE PROVIDER - NSDCMRMEDTOKEN_GEN_ALL_CORE_FT
losartan-hydrochlorothiazide 50 mg-12.5 mg oral tablet: 1 tab(s) orally once a day  topiramate: orally once a day   acetaminophen 325 mg oral tablet: 3 tab(s) orally every 8 hours  apixaban 2.5 mg oral tablet: 1 tab(s) orally 2 times a day  bisacodyl 10 mg rectal suppository: 1 suppository(ies) rectal once, As needed, Constipation  celecoxib 200 mg oral capsule: 1 cap(s) orally every 12 hours  cephalexin 500 mg oral capsule: 1 cap(s) orally 2 times a day for 1 week for infection prevention  losartan-hydrochlorothiazide 50 mg-12.5 mg oral tablet: 1 tab(s) orally once a day  magnesium hydroxide 8% oral suspension: 30 milliliter(s) orally once a day, As needed, Constipation  polyethylene glycol 3350 oral powder for reconstitution: 17 gram(s) orally once a day (at bedtime)  senna oral tablet: 2 tab(s) orally once a day (at bedtime)  topiramate: orally once a day  traMADol 50 mg oral tablet: 2 tab(s) orally every 6 hours, As needed, Moderate Pain (4 - 6)   acetaminophen 325 mg oral tablet: 3 tab(s) orally every 8 hours  apixaban 2.5 mg oral tablet: 1 tab(s) orally 2 times a day  bisacodyl 10 mg rectal suppository: 1 suppository(ies) rectal once, As needed, Constipation  celecoxib 200 mg oral capsule: 1 cap(s) orally every 12 hours  Duricef 500 mg oral capsule: 1 cap(s) orally every 12 hours for 1 week for infection prevention  losartan-hydrochlorothiazide 50 mg-12.5 mg oral tablet: 1 tab(s) orally once a day  magnesium hydroxide 8% oral suspension: 30 milliliter(s) orally once a day, As needed, Constipation  polyethylene glycol 3350 oral powder for reconstitution: 17 gram(s) orally once a day (at bedtime)  senna oral tablet: 2 tab(s) orally once a day (at bedtime)  topiramate: orally once a day  traMADol 50 mg oral tablet: 2 tab(s) orally every 6 hours, As needed, Moderate Pain (4 - 6)

## 2022-05-02 NOTE — BRIEF OPERATIVE NOTE - NSICDXBRIEFPOSTOP_GEN_ALL_CORE_FT
POST-OP DIAGNOSIS:  Broken internal right hip prosthesis, initial encounter 02-May-2022 19:10:40  Reyes Lam

## 2022-05-02 NOTE — DISCHARGE NOTE PROVIDER - NSDCCPCAREPLAN_GEN_ALL_CORE_FT
PRINCIPAL DISCHARGE DIAGNOSIS  Diagnosis: Failure of right total hip arthroplasty, initial encounter  Assessment and Plan of Treatment:

## 2022-05-02 NOTE — DISCHARGE NOTE PROVIDER - NSDCFUADDINST_GEN_ALL_CORE_FT
The patient will be seen in the office between 2-3 weeks for wound check.   **Your first post-operative visit has been scheduled prior to your admission. PLEASE CONTACT OFFICE TO CONFIRM THE APPOINTMENT DATE. Sutures/Staples/Tape will be removed at that time.  **  The silver based dressing is to be removed 7 days from the date of surgery.   ** CONTACT THE OFFICE IF THE FOLLOWING DEVELOP:  - the dressing becomes soiled or saturated  - you develop a fever greater that 101F  - the wound becomes red or you develop blistering around the wound  * Patient may shower after post-op day #3.   * The patient will continue home PT consistent with posterior total hip replacement protocol and will continue to practice posterior total hip precautions for a minimum of 6 week. Transition to outpatient PT will occur at the time of the first office visit.   * The patient is toe touch weight bearing of the right lower extremity.  *** The patient will continue ELIQUIS for blood clot prevention.   * The patient will take OXYCODONE AND TYLENOL for pain control and adjust according to prescription and patient needs. Contact the office if pain increases while taking prescribed pain medications or related concerns develop.  * Celebrex will be taken twice daily for 3 weeks for pain control and prevention of excessive bone growth. Additional prescription may be requested at your office follow-up visit.  * The patient will take Senna S while taking oxycodone to prevent narcotic associated constipation.  Additionally, increase water intake (drink at least 8 glasses of water daily) and try adding fiber to the diet by eating fruits, vegetables and foods that are rich in grains. If constipation is experienced, contact the medical/primary care provider to discuss further treatment options.  * To avoid injury at home:  - continue use of rolling walker until cleared by physical therapist  - have family or friend remove all throw rug or objects in hallways that may present a trip hazard.  - if you experience any dizziness or medical concerns, call your medical doctor or  911.  * The implant may activate metal detection devices.   * In addition, the patient will take Duricef (cefadroxil) 500mg twice daily x 7 days post op.  The patient will be seen in the office between 2-3 weeks for wound check.   **Your first post-operative visit has been scheduled prior to your admission. PLEASE CONTACT OFFICE TO CONFIRM THE APPOINTMENT DATE. Sutures/Staples/Tape will be removed at that time.  **  The prevena vac dressing is to be removed 7 days from the date of surgery and then tegaderm and gauze placed over incision.   ** CONTACT THE OFFICE IF THE FOLLOWING DEVELOP:  - the dressing becomes soiled or saturated  - you develop a fever greater that 101F  - the wound becomes red or you develop blistering around the wound  * Patient may shower after post-op day #3.   * The patient will continue home PT consistent with posterior total hip replacement protocol and will continue to practice posterior total hip precautions for a minimum of 6 week. Transition to outpatient PT will occur at the time of the first office visit.   * The patient is toe touch weight bearing of the right lower extremity.  *** The patient will continue ELIQUIS for blood clot prevention.   * The patient will take OXYCODONE AND TYLENOL for pain control and adjust according to prescription and patient needs. Contact the office if pain increases while taking prescribed pain medications or related concerns develop.  * Celebrex will be taken twice daily for 3 weeks for pain control and prevention of excessive bone growth. Additional prescription may be requested at your office follow-up visit.  * The patient will take Senna S while taking oxycodone to prevent narcotic associated constipation.  Additionally, increase water intake (drink at least 8 glasses of water daily) and try adding fiber to the diet by eating fruits, vegetables and foods that are rich in grains. If constipation is experienced, contact the medical/primary care provider to discuss further treatment options.  * To avoid injury at home:  - continue use of rolling walker until cleared by physical therapist  - have family or friend remove all throw rug or objects in hallways that may present a trip hazard.  - if you experience any dizziness or medical concerns, call your medical doctor or  911.  * The implant may activate metal detection devices.   * In addition, the patient will take Duricef (cefadroxil) 500mg twice daily x 7 days post op.  The patient will be seen in the office between 2-3 weeks for wound check.   **Your first post-operative visit has been scheduled prior to your admission. PLEASE CONTACT OFFICE TO CONFIRM THE APPOINTMENT DATE. Sutures/Staples/Tape will be removed at that time.  **  The prevena vac dressing is to be removed 7 days from the date of surgery and then tegaderm and gauze placed over incision.   ** CONTACT THE OFFICE IF THE FOLLOWING DEVELOP:  - the dressing becomes soiled or saturated  - you develop a fever greater that 101F  - the wound becomes red or you develop blistering around the wound  * Patient may shower after post-op day #3.   * The patient will continue home PT consistent with posterior total hip replacement protocol and will continue to practice posterior total hip precautions for a minimum of 6 week. Transition to outpatient PT will occur at the time of the first office visit.   * The patient is toe touch weight bearing of the right lower extremity.  *** The patient will continue ELIQUIS 2.5 mg bid for blood clot prevention for 2 weeks, then ASA 325mg bid for the following 4 weeks.  * The patient will take TRAMADOL AND TYLENOL for pain control and adjust according to prescription and patient needs. Contact the office if pain increases while taking prescribed pain medications or related concerns develop.  * Celebrex will be taken twice daily for 3 weeks for pain control and prevention of excessive bone growth. Additional prescription may be requested at your office follow-up visit.  * The patient will take Senna S while taking oxycodone to prevent narcotic associated constipation.  Additionally, increase water intake (drink at least 8 glasses of water daily) and try adding fiber to the diet by eating fruits, vegetables and foods that are rich in grains. If constipation is experienced, contact the medical/primary care provider to discuss further treatment options.  * To avoid injury at home:  - continue use of rolling walker until cleared by physical therapist  - have family or friend remove all throw rug or objects in hallways that may present a trip hazard.  - if you experience any dizziness or medical concerns, call your medical doctor or  911.  * The implant may activate metal detection devices.   * In addition, the patient will take Duricef (cefadroxil) 500mg twice daily x 7 days post op.

## 2022-05-02 NOTE — DISCHARGE NOTE PROVIDER - CARE PROVIDER_API CALL
Johan Byrnes)  Orthopaedic Surgery  [address], [facility] [address]  [place] [address]  Phone: [phone]  Fax: [phone]  Follow Up Time:

## 2022-05-02 NOTE — PRE-OP CHECKLIST - LATEX ALLERGY
F: None  E: Replete K<4, Mg<2  N: DASH/TLC diet  DVT ppx: Lovenox 25mg subq BID  Code: FULL  Dispo: [facility] [place] no

## 2022-05-02 NOTE — DISCHARGE NOTE PROVIDER - NSDCFUSCHEDAPPT_GEN_ALL_CORE_FT
Johan Byrnes  [facility] Physician [facility]  [facility] [address]  Scheduled Appointment: 05/26/2022    Johan Byrnes  [facility] [address]  Scheduled Appointment: 06/17/2022

## 2022-05-02 NOTE — PATIENT PROFILE ADULT - FALL HARM RISK - HARM RISK INTERVENTIONS
Assistance with ambulation/Assistance OOB with selected safe patient handling equipment/Communicate Risk of Fall with Harm to all staff/Discuss with provider need for PT consult/Monitor gait and stability/Provide patient with walking aids - walker, cane, crutches/Reinforce activity limits and safety measures with patient and family/Sit up slowly, dangle for a short time, stand at bedside before walking/Tailored Fall Risk Interventions/Use of alarms - bed, chair and/or voice tab/Visual Cue: Yellow wristband and red socks/Bed in lowest position, wheels locked, appropriate side rails in place/Call bell, personal items and telephone in reach/Instruct patient to call for assistance before getting out of bed or chair/Non-slip footwear when patient is out of bed/[place] to call system/Physically safe environment - no spills, clutter or unnecessary equipment/Purposeful Proactive Rounding/Room/bathroom lighting operational, light cord in reach

## 2022-05-02 NOTE — DISCHARGE NOTE PROVIDER - HOSPITAL COURSE
The patient underwent a RIGHT POSTERIOR TOTAL HIP REVISION on 5/2/22. The patient received antibiotics consistent with SCIP guidelines. The patient underwent the procedure and had no intra-operative complications. Post-operatively, the patient was seen by medicine and PT. The patient received ELIQUIS for DVTP. The patient received pain medications per orthopedic pain management pathway and the pain was appropriately controlled. Patient was instructed on posterior total hip precautions by PT. The patient did not have any post-operative medical complications. The patient was discharged in stable condition.

## 2022-05-03 LAB
ANION GAP SERPL CALC-SCNC: 15 MMOL/L — SIGNIFICANT CHANGE UP (ref 5–17)
BUN SERPL-MCNC: 20.6 MG/DL — HIGH (ref 8–20)
CALCIUM SERPL-MCNC: 8.7 MG/DL — SIGNIFICANT CHANGE UP (ref 8.6–10.2)
CHLORIDE SERPL-SCNC: 107 MMOL/L — SIGNIFICANT CHANGE UP (ref 98–107)
CO2 SERPL-SCNC: 18 MMOL/L — LOW (ref 22–29)
CREAT SERPL-MCNC: 0.69 MG/DL — SIGNIFICANT CHANGE UP (ref 0.5–1.3)
EGFR: 98 ML/MIN/1.73M2 — SIGNIFICANT CHANGE UP
GLUCOSE SERPL-MCNC: 142 MG/DL — HIGH (ref 70–99)
HCT VFR BLD CALC: 36.9 % — SIGNIFICANT CHANGE UP (ref 34.5–45)
HGB BLD-MCNC: 11.9 G/DL — SIGNIFICANT CHANGE UP (ref 11.5–15.5)
MCHC RBC-ENTMCNC: 30.9 PG — SIGNIFICANT CHANGE UP (ref 27–34)
MCHC RBC-ENTMCNC: 32.2 GM/DL — SIGNIFICANT CHANGE UP (ref 32–36)
MCV RBC AUTO: 95.8 FL — SIGNIFICANT CHANGE UP (ref 80–100)
PLATELET # BLD AUTO: 228 K/UL — SIGNIFICANT CHANGE UP (ref 150–400)
POTASSIUM SERPL-MCNC: 4.3 MMOL/L — SIGNIFICANT CHANGE UP (ref 3.5–5.3)
POTASSIUM SERPL-SCNC: 4.3 MMOL/L — SIGNIFICANT CHANGE UP (ref 3.5–5.3)
RBC # BLD: 3.85 M/UL — SIGNIFICANT CHANGE UP (ref 3.8–5.2)
RBC # FLD: 12.3 % — SIGNIFICANT CHANGE UP (ref 10.3–14.5)
SARS-COV-2 RNA SPEC QL NAA+PROBE: SIGNIFICANT CHANGE UP
SODIUM SERPL-SCNC: 140 MMOL/L — SIGNIFICANT CHANGE UP (ref 135–145)
WBC # BLD: 11.54 K/UL — HIGH (ref 3.8–10.5)
WBC # FLD AUTO: 11.54 K/UL — HIGH (ref 3.8–10.5)

## 2022-05-03 PROCEDURE — 99222 1ST HOSP IP/OBS MODERATE 55: CPT

## 2022-05-03 RX ORDER — CEPHALEXIN 500 MG
500 CAPSULE ORAL EVERY 6 HOURS
Refills: 0 | Status: DISCONTINUED | OUTPATIENT
Start: 2022-05-03 | End: 2022-05-04

## 2022-05-03 RX ORDER — TRAMADOL HYDROCHLORIDE 50 MG/1
50 TABLET ORAL EVERY 4 HOURS
Refills: 0 | Status: DISCONTINUED | OUTPATIENT
Start: 2022-05-03 | End: 2022-05-04

## 2022-05-03 RX ORDER — TRAMADOL HYDROCHLORIDE 50 MG/1
100 TABLET ORAL EVERY 6 HOURS
Refills: 0 | Status: DISCONTINUED | OUTPATIENT
Start: 2022-05-03 | End: 2022-05-04

## 2022-05-03 RX ADMIN — Medication 975 MILLIGRAM(S): at 06:17

## 2022-05-03 RX ADMIN — Medication 15 MILLIGRAM(S): at 06:18

## 2022-05-03 RX ADMIN — Medication 975 MILLIGRAM(S): at 05:08

## 2022-05-03 RX ADMIN — PANTOPRAZOLE SODIUM 40 MILLIGRAM(S): 20 TABLET, DELAYED RELEASE ORAL at 05:09

## 2022-05-03 RX ADMIN — Medication 975 MILLIGRAM(S): at 12:05

## 2022-05-03 RX ADMIN — SENNA PLUS 2 TABLET(S): 8.6 TABLET ORAL at 21:08

## 2022-05-03 RX ADMIN — TRAMADOL HYDROCHLORIDE 100 MILLIGRAM(S): 50 TABLET ORAL at 15:46

## 2022-05-03 RX ADMIN — Medication 975 MILLIGRAM(S): at 21:06

## 2022-05-03 RX ADMIN — OXYCODONE HYDROCHLORIDE 10 MILLIGRAM(S): 5 TABLET ORAL at 05:09

## 2022-05-03 RX ADMIN — Medication 500 MILLIGRAM(S): at 23:13

## 2022-05-03 RX ADMIN — Medication 15 MILLIGRAM(S): at 12:05

## 2022-05-03 RX ADMIN — Medication 15 MILLIGRAM(S): at 18:44

## 2022-05-03 RX ADMIN — APIXABAN 2.5 MILLIGRAM(S): 2.5 TABLET, FILM COATED ORAL at 18:13

## 2022-05-03 RX ADMIN — OXYCODONE HYDROCHLORIDE 10 MILLIGRAM(S): 5 TABLET ORAL at 06:10

## 2022-05-03 RX ADMIN — Medication 500 MILLIGRAM(S): at 18:14

## 2022-05-03 RX ADMIN — Medication 975 MILLIGRAM(S): at 21:09

## 2022-05-03 RX ADMIN — Medication 975 MILLIGRAM(S): at 12:35

## 2022-05-03 RX ADMIN — POLYETHYLENE GLYCOL 3350 17 GRAM(S): 17 POWDER, FOR SOLUTION ORAL at 21:08

## 2022-05-03 RX ADMIN — Medication 15 MILLIGRAM(S): at 12:35

## 2022-05-03 RX ADMIN — Medication 15 MILLIGRAM(S): at 05:08

## 2022-05-03 RX ADMIN — Medication 15 MILLIGRAM(S): at 18:14

## 2022-05-03 RX ADMIN — TRAMADOL HYDROCHLORIDE 100 MILLIGRAM(S): 50 TABLET ORAL at 15:08

## 2022-05-03 RX ADMIN — APIXABAN 2.5 MILLIGRAM(S): 2.5 TABLET, FILM COATED ORAL at 05:09

## 2022-05-03 RX ADMIN — SODIUM CHLORIDE 150 MILLILITER(S): 9 INJECTION INTRAMUSCULAR; INTRAVENOUS; SUBCUTANEOUS at 05:08

## 2022-05-03 RX ADMIN — Medication 100 MILLIGRAM(S): at 05:12

## 2022-05-03 NOTE — CONSULT NOTE ADULT - SUBJECTIVE AND OBJECTIVE BOX
Patient is a 61y old  Female who is s/p R USHA revision POD#1 . Pain well controlled , c/o mild nausea , no vomiting ,   participating with physical therapy .     CC: R hip pain due to broken R hip  prosthesis , post op pain well controlled       HPI:  60 yo F PMH of HTN, JOSE LUIS (not on CPAP), mild asthma, obesity (BMI 37.5), OA s/p right THR in 2017 at [facility] presents with c/o debilitating right hip pain for >3 weeks. She denies any pain prior to this. Patient denies any falls, trauma, or any out of the ordinary activity. Patient denies any fevers or chills. Approximately 3 weeks ago her pain was severe to the point of going to the hospital where CT and x-ray were performed. She was told to follow-up with an outpatient orthopedist who she saw and had blood work which was negative for infection. That orthopedist sent her for a ultrasound-guided psoas injection 1 week ago without significant improvement. Her hip pain is localized to the groin and she has been unable to walk since onset of pain. She is currently in a wheelchair. Imaging: X-ray AP of the pelvis and 2 views of the right hip demonstrate a right total hip arthroplasty with a fracture of the stem proximally at the proximal aspect of the body of the femoral component.      PAST MEDICAL & SURGICAL HISTORY:  Failure of right total hip arthroplasty, initial encounter    Hypertension    OA (osteoarthritis)    Broken internal right hip prosthesis, initial encounter    JOSE LUIS (obstructive sleep apnea)    Mild asthma    History of bunionectomy  right foot     S/P excision of lipoma  [id number]    History of total right hip replacement  2017 at [facility]    H/O  section          Social History:  Tobacco - ex smoker , quit 12 yrs ago  ETOH - socially   Illicit drug abuse - denies    FAMILY HISTORY:  FHx: malignant neoplasm (Mother)  ovarian cancer    FH: epilepsy (Father)    FH: arthritis (Father)    FH: diabetes mellitus (Grandparent)    FH: hypertension (Mother)        Allergies    No Known Allergies    Intolerances    opioid-like analgesics (Vomiting; Nausea)      HOME MEDICATIONS :     REVIEW OF SYSTEMS:    R hip pain postop well controlled , mild nausea + , all other systems are   reviewed and are negative .     MEDICATIONS  (STANDING):  acetaminophen     Tablet .. 975 milliGRAM(s) Oral every 8 hours  apixaban 2.5 milliGRAM(s) Oral two times a day  cephalexin 500 milliGRAM(s) Oral every 6 hours  ketorolac   Injectable 15 milliGRAM(s) IV Push every 6 hours  pantoprazole    Tablet 40 milliGRAM(s) Oral before breakfast  polyethylene glycol 3350 17 Gram(s) Oral at bedtime  senna 2 Tablet(s) Oral at bedtime  sodium chloride 0.9%. 1000 milliLiter(s) (150 mL/Hr) IV Continuous <Continuous>    MEDICATIONS  (PRN):  HYDROmorphone   Tablet 4 milliGRAM(s) Oral every 3 hours PRN Severe Pain (7 - 10)  HYDROmorphone  Injectable 0.5 milliGRAM(s) IV Push every 3 hours PRN breakthru  magnesium hydroxide Suspension 30 milliLiter(s) Oral daily PRN Constipation  ondansetron Injectable 4 milliGRAM(s) IV Push every 6 hours PRN Nausea and/or Vomiting  traMADol 50 milliGRAM(s) Oral every 4 hours PRN Mild Pain (1 - 3)  traMADol 100 milliGRAM(s) Oral every 6 hours PRN Moderate Pain (4 - 6)      Vital Signs Last 24 Hrs  T(C): 36.5 (03 May 2022 04:43), Max: 36.9 (02 May 2022 22:08)  T(F): 97.7 (03 May 2022 04:43), Max: 98.4 (02 May 2022 22:08)  HR: 84 (03 May 2022 08:00) (75 - 105)  BP: 124/80 (03 May 2022 08:00) (105/65 - 135/87)  BP(mean): 79 (02 May 2022 21:38) (75 - 88)  RR: 18 (03 May 2022 08:00) (12 - 18)  SpO2: 99% (03 May 2022 08:00) (96% - 99%)    PHYSICAL EXAM:    GENERAL: NAD, well-groomed, well-developed  HEAD:  Atraumatic, Normocephalic  EYES: EOMI, PERRLA, conjunctiva and sclera clear  NECK: Supple, No JVD, Normal thyroid  NERVOUS SYSTEM:  Alert & Oriented X4 , no focal deficit   CHEST/LUNG: CTA  b/l,  no rales, rhonchi, wheezing, or rubs  HEART: Regular rate and rhythm; No murmurs, rubs, or gallops  ABDOMEN: Soft, Nontender, Nondistended; Bowel sounds present  EXTREMITIES:  2+ Peripheral Pulses, No clubbing, cyanosis, or edema ,   R hip Prevena dressing +   LYMPH: No lymphadenopathy noted  SKIN: No rashes or lesions    LABS:                        11.9   11.54 )-----------( 228      ( 03 May 2022 06:17 )             36.9         140  |  107  |  20.6<H>  ----------------------------<  142<H>  4.3   |  18.0<L>  |  0.69    Ca    8.7      03 May 2022 06:17    RADIOLOGY & ADDITIONAL STUDIES:    < from: Xray Hip 2-3 Views Intraoperative, Right (22 @ 18:19) >    ACC: [id number] EXAM:  XR HIP 2-3VRT INTR OP                          PROCEDURE DATE:  2022          INTERPRETATION:  HISTORY: RIGHT hip Hemiarthroplasty    TECHNIQUE: Intraoperative Two views of the left hip are submitted.    Findings: Both femoral and acetabular components are well-seated and in   anatomic alignment.  Cerclage wires in place.  There is no fracture. RIGHT    The visualized pelvis is within normal limits.    IMPRESSION:  Prosthetic Hip replacement in place...    --- End of Report ---    < end of copied text >

## 2022-05-03 NOTE — PHYSICAL THERAPY INITIAL EVALUATION ADULT - ACTIVE RANGE OF MOTION EXAMINATION, REHAB EVAL
Right LE THR posterior hip precautions maintained //bilateral upper extremity Active ROM was WFL (within functional limits)/bilateral  lower extremity Active ROM was WFL (within functional limits)/deficits as listed below

## 2022-05-03 NOTE — PHYSICAL THERAPY INITIAL EVALUATION ADULT - ADDITIONAL COMMENTS
Pt lives in an apartment with 0 steps to enter and 14  stairs inside with  rails.  Pt owns medical equipment: SAC   Pt lives with: Alone   Someone is always available to provide assist.

## 2022-05-03 NOTE — CONSULT NOTE ADULT - ASSESSMENT
62 yo F PMH of HTN, JOSE LUIS (not on CPAP), mild asthma, obesity (BMI 37.5), OA s/p right THR in 2017 at [facility] presents with c/o debilitating right hip pain for >3 weeks. Found to have R hip broken internal prosthesis  . Now s/p R USHA revision on 5/2.     1. R hip broken internal prosthesis , sp R USHA revision   PT/OT/pain mgmt  DVT prophylaxis- as per ortho  Abx as per SCIP  Incentive spirometry  Prophylaxis of opioid  induced constipation.  Wound care/ Prevena as per ortho team   On PO Abx as per ortho     2. HTN - continue home meds with parameters     3. Hx of JOSE LUIS - her old CPAP was recalled , got new one and did bring with her -   may use own     4. Hx of mild asthma - stable and patient is asymptomatic   Hx of Migraine- continue home dose of Topiramate     5 . Obesity - class 2 - BMI 37.5 - will benefit from diet / life style changes     6. Leucocytosis - no S/S of infection - likely reactive     7. DVT prophylaxis  - as per ortho protocol- on Eliquis   Opioid induced constipation  prophylaxis - bowel regimen   Thank you for the courtesy of this consult ,   will follow along with you .   Dispo plan is rehab - pending PMR eval   D/W ortho PA/ nurse/ SW/CM.   Will follow along with you .

## 2022-05-04 ENCOUNTER — TRANSCRIPTION ENCOUNTER (OUTPATIENT)
Age: 62
End: 2022-05-04

## 2022-05-04 VITALS
HEART RATE: 91 BPM | TEMPERATURE: 98 F | SYSTOLIC BLOOD PRESSURE: 137 MMHG | OXYGEN SATURATION: 95 % | DIASTOLIC BLOOD PRESSURE: 86 MMHG | RESPIRATION RATE: 18 BRPM

## 2022-05-04 LAB
ANION GAP SERPL CALC-SCNC: 10 MMOL/L — SIGNIFICANT CHANGE UP (ref 5–17)
BUN SERPL-MCNC: 22.4 MG/DL — HIGH (ref 8–20)
CALCIUM SERPL-MCNC: 8.2 MG/DL — LOW (ref 8.6–10.2)
CHLORIDE SERPL-SCNC: 107 MMOL/L — SIGNIFICANT CHANGE UP (ref 98–107)
CO2 SERPL-SCNC: 21 MMOL/L — LOW (ref 22–29)
CREAT SERPL-MCNC: 0.8 MG/DL — SIGNIFICANT CHANGE UP (ref 0.5–1.3)
EGFR: 83 ML/MIN/1.73M2 — SIGNIFICANT CHANGE UP
GLUCOSE SERPL-MCNC: 119 MG/DL — HIGH (ref 70–99)
HCT VFR BLD CALC: 30.1 % — LOW (ref 34.5–45)
HGB BLD-MCNC: 9.7 G/DL — LOW (ref 11.5–15.5)
MCHC RBC-ENTMCNC: 31 PG — SIGNIFICANT CHANGE UP (ref 27–34)
MCHC RBC-ENTMCNC: 32.2 GM/DL — SIGNIFICANT CHANGE UP (ref 32–36)
MCV RBC AUTO: 96.2 FL — SIGNIFICANT CHANGE UP (ref 80–100)
PLATELET # BLD AUTO: 206 K/UL — SIGNIFICANT CHANGE UP (ref 150–400)
POTASSIUM SERPL-MCNC: 4.4 MMOL/L — SIGNIFICANT CHANGE UP (ref 3.5–5.3)
POTASSIUM SERPL-SCNC: 4.4 MMOL/L — SIGNIFICANT CHANGE UP (ref 3.5–5.3)
RBC # BLD: 3.13 M/UL — LOW (ref 3.8–5.2)
RBC # FLD: 12.8 % — SIGNIFICANT CHANGE UP (ref 10.3–14.5)
SODIUM SERPL-SCNC: 138 MMOL/L — SIGNIFICANT CHANGE UP (ref 135–145)
WBC # BLD: 8.08 K/UL — SIGNIFICANT CHANGE UP (ref 3.8–10.5)
WBC # FLD AUTO: 8.08 K/UL — SIGNIFICANT CHANGE UP (ref 3.8–10.5)

## 2022-05-04 PROCEDURE — U0003: CPT

## 2022-05-04 PROCEDURE — 99232 SBSQ HOSP IP/OBS MODERATE 35: CPT

## 2022-05-04 PROCEDURE — U0005: CPT

## 2022-05-04 PROCEDURE — 85027 COMPLETE CBC AUTOMATED: CPT

## 2022-05-04 PROCEDURE — 82962 GLUCOSE BLOOD TEST: CPT

## 2022-05-04 PROCEDURE — 88300 SURGICAL PATH GROSS: CPT

## 2022-05-04 PROCEDURE — 73502 X-RAY EXAM HIP UNI 2-3 VIEWS: CPT

## 2022-05-04 PROCEDURE — 97110 THERAPEUTIC EXERCISES: CPT

## 2022-05-04 PROCEDURE — C1776: CPT

## 2022-05-04 PROCEDURE — C1889: CPT

## 2022-05-04 PROCEDURE — 36415 COLL VENOUS BLD VENIPUNCTURE: CPT

## 2022-05-04 PROCEDURE — 80048 BASIC METABOLIC PNL TOTAL CA: CPT

## 2022-05-04 PROCEDURE — 97163 PT EVAL HIGH COMPLEX 45 MIN: CPT

## 2022-05-04 PROCEDURE — 97116 GAIT TRAINING THERAPY: CPT

## 2022-05-04 RX ORDER — CEPHALEXIN 500 MG
1 CAPSULE ORAL
Qty: 0 | Refills: 0 | DISCHARGE
Start: 2022-05-04 | End: 2022-05-11

## 2022-05-04 RX ORDER — TRAMADOL HYDROCHLORIDE 50 MG/1
2 TABLET ORAL
Qty: 0 | Refills: 0 | DISCHARGE
Start: 2022-05-04

## 2022-05-04 RX ORDER — APIXABAN 2.5 MG/1
1 TABLET, FILM COATED ORAL
Qty: 0 | Refills: 0 | DISCHARGE
Start: 2022-05-04

## 2022-05-04 RX ORDER — SENNA PLUS 8.6 MG/1
2 TABLET ORAL
Qty: 0 | Refills: 0 | DISCHARGE
Start: 2022-05-04

## 2022-05-04 RX ORDER — CELECOXIB 200 MG/1
1 CAPSULE ORAL
Qty: 0 | Refills: 0 | DISCHARGE
Start: 2022-05-04

## 2022-05-04 RX ORDER — POLYETHYLENE GLYCOL 3350 17 G/17G
17 POWDER, FOR SOLUTION ORAL
Qty: 0 | Refills: 0 | DISCHARGE
Start: 2022-05-04

## 2022-05-04 RX ORDER — ACETAMINOPHEN 500 MG
3 TABLET ORAL
Qty: 0 | Refills: 0 | DISCHARGE
Start: 2022-05-04

## 2022-05-04 RX ORDER — MAGNESIUM HYDROXIDE 400 MG/1
30 TABLET, CHEWABLE ORAL
Qty: 0 | Refills: 0 | DISCHARGE
Start: 2022-05-04

## 2022-05-04 RX ADMIN — TRAMADOL HYDROCHLORIDE 100 MILLIGRAM(S): 50 TABLET ORAL at 10:10

## 2022-05-04 RX ADMIN — Medication 500 MILLIGRAM(S): at 05:14

## 2022-05-04 RX ADMIN — Medication 975 MILLIGRAM(S): at 14:00

## 2022-05-04 RX ADMIN — Medication 12.5 MILLIGRAM(S): at 05:18

## 2022-05-04 RX ADMIN — Medication 975 MILLIGRAM(S): at 05:18

## 2022-05-04 RX ADMIN — TRAMADOL HYDROCHLORIDE 100 MILLIGRAM(S): 50 TABLET ORAL at 11:00

## 2022-05-04 RX ADMIN — CELECOXIB 200 MILLIGRAM(S): 200 CAPSULE ORAL at 05:18

## 2022-05-04 RX ADMIN — Medication 500 MILLIGRAM(S): at 14:09

## 2022-05-04 RX ADMIN — MAGNESIUM HYDROXIDE 30 MILLILITER(S): 400 TABLET, CHEWABLE ORAL at 10:15

## 2022-05-04 RX ADMIN — TRAMADOL HYDROCHLORIDE 100 MILLIGRAM(S): 50 TABLET ORAL at 02:57

## 2022-05-04 RX ADMIN — Medication 975 MILLIGRAM(S): at 14:09

## 2022-05-04 RX ADMIN — LOSARTAN POTASSIUM 50 MILLIGRAM(S): 100 TABLET, FILM COATED ORAL at 05:18

## 2022-05-04 RX ADMIN — Medication 975 MILLIGRAM(S): at 05:14

## 2022-05-04 RX ADMIN — TRAMADOL HYDROCHLORIDE 100 MILLIGRAM(S): 50 TABLET ORAL at 01:57

## 2022-05-04 RX ADMIN — APIXABAN 2.5 MILLIGRAM(S): 2.5 TABLET, FILM COATED ORAL at 05:14

## 2022-05-04 RX ADMIN — CELECOXIB 200 MILLIGRAM(S): 200 CAPSULE ORAL at 05:14

## 2022-05-04 RX ADMIN — PANTOPRAZOLE SODIUM 40 MILLIGRAM(S): 20 TABLET, DELAYED RELEASE ORAL at 05:14

## 2022-05-04 NOTE — PROGRESS NOTE ADULT - SUBJECTIVE AND OBJECTIVE BOX
Pelvis & hip films reviewed. Implants are in appropriate position. No fracture or dislocation noted. Patient is TTWB** of the surgical extremity. 
SUSY CHOUDHURY    [id number]    History: Patient is status post right posterior total hip revision on POD #2. Patient is doing well. The patient's pain is controlled using the prescribed pain medications. The patient is participating in physical therapy. Denies nausea, vomiting, chest pain, shortness of breath, abdominal pain or fever. No new complaints.                          9.7    8.08  )-----------( 206      ( 04 May 2022 05:20 )             30.1     05-04    138  |  107  |  22.4<H>  ----------------------------<  119<H>  4.4   |  21.0<L>  |  0.80    Ca    8.2<L>      04 May 2022 05:20        MEDICATIONS  (STANDING):  acetaminophen     Tablet .. 975 milliGRAM(s) Oral every 8 hours  apixaban 2.5 milliGRAM(s) Oral two times a day  celecoxib 200 milliGRAM(s) Oral every 12 hours  cephalexin 500 milliGRAM(s) Oral every 6 hours  hydrochlorothiazide 12.5 milliGRAM(s) Oral daily  losartan 50 milliGRAM(s) Oral daily  pantoprazole    Tablet 40 milliGRAM(s) Oral before breakfast  polyethylene glycol 3350 17 Gram(s) Oral at bedtime  senna 2 Tablet(s) Oral at bedtime  sodium chloride 0.9%. 1000 milliLiter(s) (150 mL/Hr) IV Continuous <Continuous>    MEDICATIONS  (PRN):  bisacodyl Suppository 10 milliGRAM(s) Rectal once PRN Constipation  HYDROmorphone   Tablet 4 milliGRAM(s) Oral every 3 hours PRN Severe Pain (7 - 10)  HYDROmorphone  Injectable 0.5 milliGRAM(s) IV Push every 3 hours PRN breakthru  magnesium hydroxide Suspension 30 milliLiter(s) Oral daily PRN Constipation  ondansetron Injectable 4 milliGRAM(s) IV Push every 6 hours PRN Nausea and/or Vomiting  traMADol 50 milliGRAM(s) Oral every 4 hours PRN Mild Pain (1 - 3)  traMADol 100 milliGRAM(s) Oral every 6 hours PRN Moderate Pain (4 - 6)      Physical exam: The right hip PREVENA is clean, dry and intact. No drainage or discharge. No erythema is noted. No blistering. No ecchymosis. The calf is supple nontender. Passive range of motion is acceptable to due postoperative pain. Sensation to light touch is grossly intact distally. Motor function distally is 5/5. No foot drop. 2+ dorsalis pedis pulse. Capillary refill is less than 2 seconds. No cyanosis.    Primary Orthopedic Assessment:  • s/p RIGHT POSTERIOR total hip revision    Secondary  Orthopedic Assessment(s):   •     Secondary  Medical Assessment(s):   •     Plan:   • DVT prophylaxis as prescribed, including use of compression devices and ankle pumps  • Continue physical therapy  • TTWB right lower extremity with assistance of a walker  • Incentive spirometry encouraged  • Pain control as clinically indicated  • Posterior hip precautions and abduction precautions reviewed with patient  • Discharge planning – anticipated discharge is Home vs rehab  * Keflex/Duricef x 1 week post-op for infection prevention  
  SUSY CHOUDHURY    [id number]    History: Patient is status post right posterior total hip revision POD # 0. Patient is doing well and is comfortable. The patient's pain is controlled using the prescribed pain medications. The patient is participating in physical therapy. Patient admits to 1 episode of nausea. Denies CP, SOB, dizziness, HA, fever/chills, numbness or tingling. No new complaints.    Vital Signs Last 24 Hrs  T(C): 36.5 (03 May 2022 04:43), Max: 37.4 (02 May 2022 10:45)  T(F): 97.7 (03 May 2022 04:43), Max: 99.4 (02 May 2022 10:45)  HR: 84 (03 May 2022 08:00) (75 - 105)  BP: 124/80 (03 May 2022 08:00) (105/65 - 135/87)  BP(mean): 79 (02 May 2022 21:38) (75 - 88)  RR: 18 (03 May 2022 08:00) (12 - 18)  SpO2: 99% (03 May 2022 08:00) (96% - 100%)                          11.9   11.54 )-----------( 228      ( 03 May 2022 06:17 )             36.9     05-03    140  |  107  |  20.6<H>  ----------------------------<  142<H>  4.3   |  18.0<L>  |  0.69    Ca    8.7      03 May 2022 06:17        General: Alert, awake, NAD, abduction pillow in place  Physical exam: The right hip prevena dressing is clean, dry and intact. Prevena dressing in place, tube patent, functioning properly. No drainage, discharge, erythema, blistering or ecchymosis. The calf is supple nontender b/l.   SILT. +EHL/FHL/AT/GC. 2+ DP pulse. BCR. No cyanosis.    Plan:   - DVT prophylaxis as prescribed, including use of compression devices and ankle pumps  -  Continue physical therapy  - Weight bearing status of surgical extremity: TTWB  - will d/c oxycodone and trial tramadol for pain control   - Incentive spirometry encouraged  - Pain control as clinically indicated  - Posterior hip precautions and abduction precautions reviewed with patient  - Discharge planning – anticipated discharge is Home / subacute rehabilitation / acute rehabilitation  
  SUSY CHOUDHURY    [id number]    History: Patient is status post right posterior total hip revision POD # 0. Patient is doing well and is comfortable. The patient's pain is controlled using the prescribed pain medications. The patient will be participating in physical therapy. Denies numbness or tingling. No new complaints.    Vital Signs Last 24 Hrs  T(C): 37.4 (02 May 2022 10:45), Max: 37.4 (02 May 2022 10:45)  T(F): 99.4 (02 May 2022 10:45), Max: 99.4 (02 May 2022 10:45)  HR: 85 (02 May 2022 10:45) (85 - 85)  BP: 129/81 (02 May 2022 10:45) (129/81 - 129/81)  BP(mean): --  RR: 16 (02 May 2022 10:45) (16 - 16)  SpO2: 100% (02 May 2022 10:45) (100% - 100%)    General: Alert, awake, NAD, abduction pillow in place  Physical exam: The right hip prevena dressing is clean, dry and intact. Prevena dressing in place, tube patent, functioning properly. No drainage, discharge, erythema, blistering or ecchymosis. The calf is supple nontender b/l. SILT. +EHL/FHL/AT/GC. 2+ DP pulse. BCR. No cyanosis.    Plan:   - DVT prophylaxis as prescribed, including use of compression devices and ankle pumps  -  Continue physical therapy  - Weight bearing status of surgical extremity: TOE TOUCH WEIGHT BEARING RLE**  - Incentive spirometry encouraged  - Pain control as clinically indicated  - Posterior hip precautions AND abductor precautions**   - Discharge planning – anticipated discharge is Home / subacute rehabilitation / acute rehabilitation  - f/u AM labs
Patient seen and examined . S/p R USHA  , POD # 2. Pain well controlled , no n/v , voiding without difficulty ,   participating with physical therapy .     CC : R hip chronic pain postop well controlled       MEDICATIONS  (STANDING):  acetaminophen     Tablet .. 975 milliGRAM(s) Oral every 8 hours  apixaban 2.5 milliGRAM(s) Oral two times a day  celecoxib 200 milliGRAM(s) Oral every 12 hours  cephalexin 500 milliGRAM(s) Oral every 6 hours  hydrochlorothiazide 12.5 milliGRAM(s) Oral daily  losartan 50 milliGRAM(s) Oral daily  pantoprazole    Tablet 40 milliGRAM(s) Oral before breakfast  polyethylene glycol 3350 17 Gram(s) Oral at bedtime  senna 2 Tablet(s) Oral at bedtime  sodium chloride 0.9%. 1000 milliLiter(s) (150 mL/Hr) IV Continuous <Continuous>    MEDICATIONS  (PRN):  bisacodyl Suppository 10 milliGRAM(s) Rectal once PRN Constipation  HYDROmorphone   Tablet 4 milliGRAM(s) Oral every 3 hours PRN Severe Pain (7 - 10)  HYDROmorphone  Injectable 0.5 milliGRAM(s) IV Push every 3 hours PRN breakthru  magnesium hydroxide Suspension 30 milliLiter(s) Oral daily PRN Constipation  ondansetron Injectable 4 milliGRAM(s) IV Push every 6 hours PRN Nausea and/or Vomiting  traMADol 50 milliGRAM(s) Oral every 4 hours PRN Mild Pain (1 - 3)  traMADol 100 milliGRAM(s) Oral every 6 hours PRN Moderate Pain (4 - 6)      LABS:                          9.7    8.08  )-----------( 206      ( 04 May 2022 05:20 )             30.1     05-04    138  |  107  |  22.4<H>  ----------------------------<  119<H>  4.4   |  21.0<L>  |  0.80    Ca    8.2<L>      04 May 2022 05:20        REVIEW OF SYSTEMS:    R hip chronic pain postop well controlled ,   all other systems are reviewed and are negative     Vital Signs Last 24 Hrs  T(C): 36.6 (04 May 2022 09:11), Max: 36.6 (04 May 2022 04:56)  T(F): 97.9 (04 May 2022 09:11), Max: 97.9 (04 May 2022 04:56)  HR: 75 (04 May 2022 09:11) (67 - 77)  BP: 109/74 (04 May 2022 09:11) (103/64 - 135/85)  BP(mean): --  RR: 18 (04 May 2022 09:11) (18 - 18)  SpO2: 93% (04 May 2022 09:11) (93% - 97%)    PHYSICAL EXAM:    GENERAL: NAD, well-groomed, well-developed  HEAD:  Atraumatic, Normocephalic  EYES: EOMI, PERRLA, conjunctiva and sclera clear  NECK: Supple, No JVD, Normal thyroid  NERVOUS SYSTEM:  Alert & Oriented X3, no focal deficit  CHEST/LUNG: CTA b/l ,  no  rales, rhonchi, wheezing, or rubs  HEART: Regular rate and rhythm; No murmurs, rubs, or gallops  ABDOMEN: Soft, Nontender, Nondistended; Bowel sounds present  EXTREMITIES:  2+ Peripheral Pulses, No clubbing, cyanosis, or edema,   R hip Prevena + , clean and dry   LYMPH: No lymphadenopathy noted  SKIN: No rashes or lesions

## 2022-05-04 NOTE — PROGRESS NOTE ADULT - ASSESSMENT
60 yo F PMH of HTN, JOSE LUIS (not on CPAP), mild asthma, obesity (BMI 37.5), OA s/p right THR in 2017 at [facility] presents with c/o debilitating right hip pain for >3 weeks. Found to have R hip broken internal prosthesis  . Now s/p R USHA revision on 5/2.     1. R hip broken internal prosthesis , sp R USHA revision   PT/OT/pain mgmt  DVT prophylaxis- as per ortho  Abx as per SCIP- given   Incentive spirometry  Prophylaxis of opioid  induced constipation.  Wound care/ Prevena as per ortho team   On PO Abx x1 wk for infection prophylaxis  as per ortho     2. HTN - continue home meds with parameters     3. Hx of JOSE LUIS - her old CPAP was recalled , got new one and did bring with her -   may use own     4. Hx of mild asthma - stable and patient is asymptomatic   Hx of Migraine- continue home dose of Topiramate     5 . Obesity - class 2 - BMI 37.5 - will benefit from diet / life style changes     6. Leucocytosis - no S/S of infection - likely reactive - resolved     7. Anemia - acute blood lost anemia - secondary to surgical blood lost -   patient is asymptomatic.     7. DVT prophylaxis  - as per ortho protocol- on Eliquis   Opioid induced constipation  prophylaxis - bowel regimen     Dispo plan is Home Vs rehab ,   d/w ortho PA / CM    Medically stable to d/c once cleared by physical therapy / ortho .

## 2022-05-04 NOTE — DISCHARGE NOTE NURSING/CASE MANAGEMENT/SOCIAL WORK - NSDCPEFALRISK_GEN_ALL_CORE
For information on Fall & Injury Prevention, visit: [email or web address] OR  [email or web address] OR  https://www.cdc.gov/steadi/patient.html

## 2022-05-04 NOTE — DISCHARGE NOTE NURSING/CASE MANAGEMENT/SOCIAL WORK - PATIENT PORTAL LINK FT
You can access the FollowMyHealth Patient Portal offered by [facility] by registering at the following website: [email or web address]. By joining [facility]’s FollowMyHealth portal, you will also be able to view your health information using other applications (apps) compatible with our system.

## 2022-05-11 LAB — SURGICAL PATHOLOGY STUDY: SIGNIFICANT CHANGE UP

## 2022-05-12 ENCOUNTER — NON-APPOINTMENT (OUTPATIENT)
Age: 62
End: 2022-05-12

## 2022-05-26 ENCOUNTER — APPOINTMENT (OUTPATIENT)
Dept: ORTHOPEDIC SURGERY | Facility: CLINIC | Age: 62
End: 2022-05-26
Payer: COMMERCIAL

## 2022-05-26 VITALS
SYSTOLIC BLOOD PRESSURE: 96 MMHG | HEIGHT: 65 IN | BODY MASS INDEX: 34.99 KG/M2 | WEIGHT: 210 LBS | HEART RATE: 102 BPM | DIASTOLIC BLOOD PRESSURE: 67 MMHG

## 2022-05-26 DIAGNOSIS — Z96.649 PRESENCE OF UNSPECIFIED ARTIFICIAL HIP JOINT: ICD-10-CM

## 2022-05-26 PROCEDURE — 73502 X-RAY EXAM HIP UNI 2-3 VIEWS: CPT | Mod: RT

## 2022-05-26 PROCEDURE — 99024 POSTOP FOLLOW-UP VISIT: CPT

## 2022-05-26 NOTE — HISTORY OF PRESENT ILLNESS
[de-identified] : s/p Revision right total hip arthroplasty, posterior approach  DOS :05/02/22 [de-identified] : SUSY CHOUDHURY is doing well 3 week s/p right total hip replacement. She is participating in physical therapy in a rehab center. Her pain level is controlled. She is taking Eliquis for DVT prophylaxis. Overall, she is very happy with the results of the surgery so far.	She is toe-touch weightbearing.  She has been following this with a walker at physical therapy at the rehab facility.	  [de-identified] : Exam of the right hip shows a well healing incision.  The staples were removed and Steri-Strips placed.  No evidence of infection.  The patient is able to stand up and maintain toe-touch weightbearing with the use of a walker from the wheelchair. [de-identified] :  X-ray: AP of the pelvis and 2 views of the right hip demonstrate a right total hip arthroplasty revision in stable position, with no evidence of fracture, loosening, or dislocation.  Compared to postop x-rays there has been no subsidence of the femoral component or displacement of the trochanteric osteotomy.	  [de-identified] : The patient is doing very well 3 weeks after right posterior total hip replacement revision. the patient will remain toe touch weightbearing for 3 more weeks. The patient may return home when she is ready to leave her rehab center. The patient will continue physical therapy. The patient will continue Eliquis for DVT prophylaxis for the next three weeks. Posterior hip precautions were reinforced. Overall the patient is very happy with their outcome so far. Followup in 3 weeks with repeat x-rays.

## 2022-05-26 NOTE — ADDENDUM
[FreeTextEntry1] : This note was authored by Ruperto Merino working as a medical scribe for Dr. Johan Byrnes. The note was reviewed, edited, and revised by Dr. Johan Byrnes whom is in agreement with the exam findings, imaging findings, and treatment plan. 05/26/2022

## 2022-06-07 ENCOUNTER — APPOINTMENT (OUTPATIENT)
Dept: ORTHOPEDIC SURGERY | Facility: CLINIC | Age: 62
End: 2022-06-07
Payer: COMMERCIAL

## 2022-06-07 VITALS — HEIGHT: 65 IN | BODY MASS INDEX: 34.99 KG/M2 | WEIGHT: 210 LBS

## 2022-06-07 PROBLEM — T84.010A: Chronic | Status: ACTIVE | Noted: 2022-04-19

## 2022-06-07 PROBLEM — G47.33 OBSTRUCTIVE SLEEP APNEA (ADULT) (PEDIATRIC): Chronic | Status: ACTIVE | Noted: 2022-04-19

## 2022-06-07 PROBLEM — M19.90 UNSPECIFIED OSTEOARTHRITIS, UNSPECIFIED SITE: Chronic | Status: ACTIVE | Noted: 2022-04-19

## 2022-06-07 PROBLEM — I10 ESSENTIAL (PRIMARY) HYPERTENSION: Chronic | Status: ACTIVE | Noted: 2022-04-19

## 2022-06-07 PROCEDURE — 99024 POSTOP FOLLOW-UP VISIT: CPT

## 2022-06-07 PROCEDURE — 73502 X-RAY EXAM HIP UNI 2-3 VIEWS: CPT | Mod: RT

## 2022-06-07 NOTE — HISTORY OF PRESENT ILLNESS
[de-identified] : s/p Revision right total hip arthroplasty, posterior approach  DOS :05/02/22 [de-identified] : SUSY CHOUDHURY is doing well 5 week s/p right total hip replacement revision. She is leaving the rehab center today. Her pain level is controlled. She is taking Eliquis for DVT prophylaxis. Overall, she is very happy with the results of the surgery so far.	She is toe-touch weightbearing.  She has been following this with a walker at physical therapy at the rehab facility.	  [de-identified] : Exam of the right hip shows a well healed incision, hip flexion of 90 degrees(without pain), hip external rotation of 30 degrees. Patient can perform a straight leg raise with some weakness.		 \par \par  5/5 motor strength bilaterally distally. Sensation intact distally.		  [de-identified] :  X-ray: AP of the pelvis and 2 views of the right hip demonstrate a right total hip arthroplasty in stable position, with no evidence of fracture, loosening, or dislocation. Trochanter in stable position with no change in cerclage fixation.		  [de-identified] : The patient is doing very well 5 weeks following posterior total hip replacement. Posterior hip precautions were reviewed and recommended to be followed for another 7 weeks. The patient may now be partial weightbearing. The patient will continue physical therapy and transition to it at home. The patient may stop her eliquis. Dental prophylaxis was reviewed. The patient is doing very well so far and overall very happy with their progress. Follow up in 2 weeks at which point we will consider moving her to total weightbearing if xrays remain stable.

## 2022-06-07 NOTE — ADDENDUM
[FreeTextEntry1] : This note was authored by Ruperto Merino working as a medical scribe for Dr. Johan Byrnes. The note was reviewed, edited, and revised by Dr. Johan Byrnes whom is in agreement with the exam findings, imaging findings, and treatment plan. 06/07/2022

## 2022-06-23 ENCOUNTER — APPOINTMENT (OUTPATIENT)
Dept: ORTHOPEDIC SURGERY | Facility: CLINIC | Age: 62
End: 2022-06-23
Payer: COMMERCIAL

## 2022-06-23 VITALS
HEART RATE: 89 BPM | BODY MASS INDEX: 34.99 KG/M2 | WEIGHT: 210 LBS | HEIGHT: 65 IN | SYSTOLIC BLOOD PRESSURE: 147 MMHG | DIASTOLIC BLOOD PRESSURE: 93 MMHG

## 2022-06-23 DIAGNOSIS — Z47.1 AFTERCARE FOLLOWING JOINT REPLACEMENT SURGERY: ICD-10-CM

## 2022-06-23 DIAGNOSIS — Z96.649 AFTERCARE FOLLOWING JOINT REPLACEMENT SURGERY: ICD-10-CM

## 2022-06-23 PROCEDURE — 73502 X-RAY EXAM HIP UNI 2-3 VIEWS: CPT | Mod: RT

## 2022-06-23 PROCEDURE — 99024 POSTOP FOLLOW-UP VISIT: CPT

## 2022-06-23 NOTE — ADDENDUM
[FreeTextEntry1] : This note was authored by Ruperto Merino working as a medical scribe for Dr. Johan Byrnes. The note was reviewed, edited, and revised by Dr. Johan Byrnes whom is in agreement with the exam findings, imaging findings, and treatment plan. 06/23/2022

## 2022-06-23 NOTE — HISTORY OF PRESENT ILLNESS
[de-identified] : s/p Revision right total hip arthroplasty, posterior approach DOS :05/02/22.  [de-identified] : Patient is a 61 year old female who presents for follow up of right hip revision. patient was seen 2 weeks ago and was changed to Partial WB.  patient stopped her eliquis at last visit.  Patient states groin pain continues but her thigh and buttock pain have improved.  patient in home PT. The patient ambulates with a walker.   [de-identified] : Exam of the right hip shows a well healed incision, hip flexion of 90 degrees, hip external rotation of 30 degrees, hip internal rotation assessment deferred. Patient cannot perform a straight leg raise yet.		 \par  5/5 motor strength bilaterally distally. Sensation intact distally.		  [de-identified] :  X-ray: AP of the pelvis and 2 views of the right hip demonstrate a right total hip arthroplasty in stable position, with no evidence of fracture, loosening, or dislocation.		  [de-identified] : The patient is doing well 7 weeks following right posterior total hip replacement revision. The patient may put full weight on her hip as tolerated with a walker. Posterior hip precautions were reviewed and recommended to be followed for another 5 weeks. The patient will start outpatient physical therapy.  Dental prophylaxis was reviewed. The patient is doing very well so far and overall very happy with her progress. Follow up in 5 weeks.

## 2022-08-05 ENCOUNTER — APPOINTMENT (OUTPATIENT)
Dept: ORTHOPEDIC SURGERY | Facility: CLINIC | Age: 62
End: 2022-08-05

## 2022-08-05 VITALS
DIASTOLIC BLOOD PRESSURE: 88 MMHG | HEIGHT: 65 IN | BODY MASS INDEX: 34.99 KG/M2 | SYSTOLIC BLOOD PRESSURE: 144 MMHG | HEART RATE: 103 BPM | WEIGHT: 210 LBS

## 2022-08-05 PROCEDURE — 73502 X-RAY EXAM HIP UNI 2-3 VIEWS: CPT | Mod: RT

## 2022-08-05 PROCEDURE — 99214 OFFICE O/P EST MOD 30 MIN: CPT

## 2022-08-05 NOTE — HISTORY OF PRESENT ILLNESS
[de-identified] : The patient is doing well 3 months after Revision right THR. The patient continues to go to outpatient PT 3x a week, but still feels she has some pain in the groin and quad. This pain is worse with weightbearing activity. She currently only takes Advil for pain and states it is not helping significantly. She uses a cane because she still has a limp.  She denies any falls or trauma. She denies any fevers of chills.

## 2022-08-05 NOTE — DISCUSSION/SUMMARY
[de-identified] : The patient is a 61-year-old female status post revision right hip replacement with extended trochanteric osteotomy 3 months ago.  I am concerned that she is developing a nonunion of the trochanteric fragment because of the x-ray findings as well as the fact that she still has pain and a limp.  I am recommending a CT scan to assess for healing.  She will keep using the cane in her left hand.  She is going to stop physical therapy for now until we have the CT scan.  Follow-up once the CT scan is completed.

## 2022-08-05 NOTE — PHYSICAL EXAM
[de-identified] : The patient appears well nourished  and in no apparent distress.  The patient is alert and oriented to person, place, and time.   Affect and mood appear normal. The head is normocephalic and atraumatic.  The eyes reveal normal sclera and extra ocular muscles are intact. The tongue is midline with no apparent lesions.  Skin shows normal turgor with no evidence of eczema or psoriasis.  No respiratory distress noted.  Sensation grossly intact.		  [de-identified] : Exam of the right hip shows a well healed incision, hip flexion of 90 degrees(without pain), hip external rotation of 35 degrees(without pain). There is no pain with range of motion. Patient can perform a straight leg raise.\par \par 5/5 motor strength bilaterally distally. Sensation intact distally.		  [de-identified] :  X-ray: AP of the pelvis and 2 views of the right hip demonstrate a right total hip arthroplasty.  The acetabular and femoral components appear well fixed in stable position.  There appears to be some eroding of the cerclage cables into the trochanteric fragment as well as over the lesser trochanter.  On the lateral view the trochanteric fragment appears slightly further from the femoral component body than prior x-ray.

## 2022-08-05 NOTE — ADDENDUM
[FreeTextEntry1] : This note was authored by Ruperto Merino working as a medical scribe for Dr. Johan Byrnes. The note was reviewed, edited, and revised by Dr. Johan Byrnes whom is in agreement with the exam findings, imaging findings, and treatment plan. 08/05/2022

## 2022-08-05 NOTE — REASON FOR VISIT
[Follow-Up Visit] : a follow-up visit for [Other: ____] : [unfilled] [FreeTextEntry2] : s/p Revision right total hip arthroplasty, posterior approach DOS :05/02/22.

## 2022-08-15 ENCOUNTER — TRANSCRIPTION ENCOUNTER (OUTPATIENT)
Age: 62
End: 2022-08-15

## 2022-08-16 ENCOUNTER — RESULT REVIEW (OUTPATIENT)
Age: 62
End: 2022-08-16

## 2022-08-16 ENCOUNTER — OUTPATIENT (OUTPATIENT)
Dept: OUTPATIENT SERVICES | Facility: HOSPITAL | Age: 62
LOS: 1 days | End: 2022-08-16
Payer: COMMERCIAL

## 2022-08-16 ENCOUNTER — APPOINTMENT (OUTPATIENT)
Dept: CT IMAGING | Facility: CLINIC | Age: 62
End: 2022-08-16

## 2022-08-16 DIAGNOSIS — Z00.8 ENCOUNTER FOR OTHER GENERAL EXAMINATION: ICD-10-CM

## 2022-08-16 DIAGNOSIS — Z98.890 OTHER SPECIFIED POSTPROCEDURAL STATES: Chronic | ICD-10-CM

## 2022-08-16 DIAGNOSIS — Z96.649 PRESENCE OF UNSPECIFIED ARTIFICIAL HIP JOINT: ICD-10-CM

## 2022-08-16 DIAGNOSIS — Z98.891 HISTORY OF UTERINE SCAR FROM PREVIOUS SURGERY: Chronic | ICD-10-CM

## 2022-08-16 DIAGNOSIS — Z96.641 PRESENCE OF RIGHT ARTIFICIAL HIP JOINT: Chronic | ICD-10-CM

## 2022-08-16 PROCEDURE — 76376 3D RENDER W/INTRP POSTPROCES: CPT | Mod: 26

## 2022-08-16 PROCEDURE — 73700 CT LOWER EXTREMITY W/O DYE: CPT

## 2022-08-16 PROCEDURE — 73700 CT LOWER EXTREMITY W/O DYE: CPT | Mod: 26,RT

## 2022-08-16 PROCEDURE — 76376 3D RENDER W/INTRP POSTPROCES: CPT

## 2022-08-27 ENCOUNTER — NON-APPOINTMENT (OUTPATIENT)
Age: 62
End: 2022-08-27

## 2022-08-27 ENCOUNTER — INPATIENT (INPATIENT)
Facility: HOSPITAL | Age: 62
LOS: 2 days | Discharge: ANOTHER HOSPITAL | DRG: 566 | End: 2022-08-30
Attending: HOSPITALIST | Admitting: STUDENT IN AN ORGANIZED HEALTH CARE EDUCATION/TRAINING PROGRAM
Payer: COMMERCIAL

## 2022-08-27 VITALS
HEART RATE: 102 BPM | OXYGEN SATURATION: 98 % | HEIGHT: 62 IN | SYSTOLIC BLOOD PRESSURE: 170 MMHG | DIASTOLIC BLOOD PRESSURE: 100 MMHG | RESPIRATION RATE: 18 BRPM

## 2022-08-27 DIAGNOSIS — Z29.9 ENCOUNTER FOR PROPHYLACTIC MEASURES, UNSPECIFIED: ICD-10-CM

## 2022-08-27 DIAGNOSIS — I10 ESSENTIAL (PRIMARY) HYPERTENSION: ICD-10-CM

## 2022-08-27 DIAGNOSIS — G43.909 MIGRAINE, UNSPECIFIED, NOT INTRACTABLE, WITHOUT STATUS MIGRAINOSUS: ICD-10-CM
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
## 2022-08-27 DIAGNOSIS — Z98.890 OTHER SPECIFIED POSTPROCEDURAL STATES: Chronic | ICD-10-CM

## 2022-08-27 DIAGNOSIS — S72.009A FRACTURE OF UNSPECIFIED PART OF NECK OF UNSPECIFIED FEMUR, INITIAL ENCOUNTER FOR CLOSED FRACTURE: ICD-10-CM

## 2022-08-27 DIAGNOSIS — M25.551 PAIN IN RIGHT HIP: ICD-10-CM

## 2022-08-27 DIAGNOSIS — Z98.891 HISTORY OF UTERINE SCAR FROM PREVIOUS SURGERY: Chronic | ICD-10-CM

## 2022-08-27 DIAGNOSIS — Z96.641 PRESENCE OF RIGHT ARTIFICIAL HIP JOINT: Chronic | ICD-10-CM

## 2022-08-27 LAB
ALBUMIN SERPL ELPH-MCNC: 4.5 G/DL — SIGNIFICANT CHANGE UP (ref 3.3–5)
ALP SERPL-CCNC: 162 U/L — HIGH (ref 40–120)
ALT FLD-CCNC: 20 U/L — SIGNIFICANT CHANGE UP (ref 10–45)
ANION GAP SERPL CALC-SCNC: 11 MMOL/L — SIGNIFICANT CHANGE UP (ref 5–17)
APPEARANCE UR: CLEAR — SIGNIFICANT CHANGE UP
APTT BLD: 27.4 SEC — LOW (ref 27.5–35.5)
AST SERPL-CCNC: 19 U/L — SIGNIFICANT CHANGE UP (ref 10–40)
BACTERIA # UR AUTO: NEGATIVE — SIGNIFICANT CHANGE UP
BASOPHILS # BLD AUTO: 0.03 K/UL — SIGNIFICANT CHANGE UP (ref 0–0.2)
BASOPHILS NFR BLD AUTO: 0.5 % — SIGNIFICANT CHANGE UP (ref 0–2)
BILIRUB SERPL-MCNC: 0.3 MG/DL — SIGNIFICANT CHANGE UP (ref 0.2–1.2)
BILIRUB UR-MCNC: NEGATIVE — SIGNIFICANT CHANGE UP
BUN SERPL-MCNC: 22 MG/DL — SIGNIFICANT CHANGE UP (ref 7–23)
CALCIUM SERPL-MCNC: 9.7 MG/DL — SIGNIFICANT CHANGE UP (ref 8.4–10.5)
CHLORIDE SERPL-SCNC: 104 MMOL/L — SIGNIFICANT CHANGE UP (ref 96–108)
CO2 SERPL-SCNC: 24 MMOL/L — SIGNIFICANT CHANGE UP (ref 22–31)
COLOR SPEC: SIGNIFICANT CHANGE UP
CREAT SERPL-MCNC: 0.61 MG/DL — SIGNIFICANT CHANGE UP (ref 0.5–1.3)
DIFF PNL FLD: NEGATIVE — SIGNIFICANT CHANGE UP
EGFR: 101 ML/MIN/1.73M2 — SIGNIFICANT CHANGE UP
EOSINOPHIL # BLD AUTO: 0.13 K/UL — SIGNIFICANT CHANGE UP (ref 0–0.5)
EOSINOPHIL NFR BLD AUTO: 2.2 % — SIGNIFICANT CHANGE UP (ref 0–6)
EPI CELLS # UR: 6 /HPF — HIGH
GLUCOSE SERPL-MCNC: 87 MG/DL — SIGNIFICANT CHANGE UP (ref 70–99)
GLUCOSE UR QL: NEGATIVE — SIGNIFICANT CHANGE UP
HCT VFR BLD CALC: 40.6 % — SIGNIFICANT CHANGE UP (ref 34.5–45)
HGB BLD-MCNC: 12.8 G/DL — SIGNIFICANT CHANGE UP (ref 11.5–15.5)
HYALINE CASTS # UR AUTO: 2 /LPF — SIGNIFICANT CHANGE UP (ref 0–2)
IMM GRANULOCYTES NFR BLD AUTO: 0.5 % — SIGNIFICANT CHANGE UP (ref 0–1.5)
INR BLD: 0.96 RATIO — SIGNIFICANT CHANGE UP (ref 0.88–1.16)
KETONES UR-MCNC: ABNORMAL
LEUKOCYTE ESTERASE UR-ACNC: ABNORMAL
LYMPHOCYTES # BLD AUTO: 1.55 K/UL — SIGNIFICANT CHANGE UP (ref 1–3.3)
LYMPHOCYTES # BLD AUTO: 25.8 % — SIGNIFICANT CHANGE UP (ref 13–44)
MCHC RBC-ENTMCNC: 28.9 PG — SIGNIFICANT CHANGE UP (ref 27–34)
MCHC RBC-ENTMCNC: 31.5 GM/DL — LOW (ref 32–36)
MCV RBC AUTO: 91.6 FL — SIGNIFICANT CHANGE UP (ref 80–100)
MONOCYTES # BLD AUTO: 0.71 K/UL — SIGNIFICANT CHANGE UP (ref 0–0.9)
MONOCYTES NFR BLD AUTO: 11.8 % — SIGNIFICANT CHANGE UP (ref 2–14)
NEUTROPHILS # BLD AUTO: 3.56 K/UL — SIGNIFICANT CHANGE UP (ref 1.8–7.4)
NEUTROPHILS NFR BLD AUTO: 59.2 % — SIGNIFICANT CHANGE UP (ref 43–77)
NITRITE UR-MCNC: NEGATIVE — SIGNIFICANT CHANGE UP
NRBC # BLD: 0 /100 WBCS — SIGNIFICANT CHANGE UP (ref 0–0)
PH UR: 5.5 — SIGNIFICANT CHANGE UP (ref 5–8)
PLATELET # BLD AUTO: 334 K/UL — SIGNIFICANT CHANGE UP (ref 150–400)
POTASSIUM SERPL-MCNC: 3.9 MMOL/L — SIGNIFICANT CHANGE UP (ref 3.5–5.3)
POTASSIUM SERPL-SCNC: 3.9 MMOL/L — SIGNIFICANT CHANGE UP (ref 3.5–5.3)
PROT SERPL-MCNC: 7.3 G/DL — SIGNIFICANT CHANGE UP (ref 6–8.3)
PROT UR-MCNC: NEGATIVE — SIGNIFICANT CHANGE UP
PROTHROM AB SERPL-ACNC: 11.1 SEC — SIGNIFICANT CHANGE UP (ref 10.5–13.4)
RBC # BLD: 4.43 M/UL — SIGNIFICANT CHANGE UP (ref 3.8–5.2)
RBC # FLD: 14.4 % — SIGNIFICANT CHANGE UP (ref 10.3–14.5)
RBC CASTS # UR COMP ASSIST: 1 /HPF — SIGNIFICANT CHANGE UP (ref 0–4)
SARS-COV-2 RNA SPEC QL NAA+PROBE: SIGNIFICANT CHANGE UP
SODIUM SERPL-SCNC: 139 MMOL/L — SIGNIFICANT CHANGE UP (ref 135–145)
SP GR SPEC: 1.03 — HIGH (ref 1.01–1.02)
UROBILINOGEN FLD QL: NEGATIVE — SIGNIFICANT CHANGE UP
WBC # BLD: 6.01 K/UL — SIGNIFICANT CHANGE UP (ref 3.8–10.5)
WBC # FLD AUTO: 6.01 K/UL — SIGNIFICANT CHANGE UP (ref 3.8–10.5)
WBC UR QL: 17 /HPF — HIGH (ref 0–5)

## 2022-08-27 PROCEDURE — 72192 CT PELVIS W/O DYE: CPT | Mod: 26,MA

## 2022-08-27 PROCEDURE — 73502 X-RAY EXAM HIP UNI 2-3 VIEWS: CPT | Mod: 26,RT

## 2022-08-27 PROCEDURE — 73552 X-RAY EXAM OF FEMUR 2/>: CPT | Mod: 26,RT

## 2022-08-27 PROCEDURE — 99285 EMERGENCY DEPT VISIT HI MDM: CPT

## 2022-08-27 PROCEDURE — 73700 CT LOWER EXTREMITY W/O DYE: CPT | Mod: 26,RT,MA

## 2022-08-27 PROCEDURE — 71045 X-RAY EXAM CHEST 1 VIEW: CPT | Mod: 26

## 2022-08-27 PROCEDURE — 99223 1ST HOSP IP/OBS HIGH 75: CPT

## 2022-08-27 PROCEDURE — 73030 X-RAY EXAM OF SHOULDER: CPT | Mod: 26,RT

## 2022-08-27 PROCEDURE — 76377 3D RENDER W/INTRP POSTPROCES: CPT | Mod: 26,76

## 2022-08-27 PROCEDURE — 73562 X-RAY EXAM OF KNEE 3: CPT | Mod: 26,RT

## 2022-08-27 RX ORDER — LANOLIN ALCOHOL/MO/W.PET/CERES
3 CREAM (GRAM) TOPICAL AT BEDTIME
Refills: 0 | Status: DISCONTINUED | OUTPATIENT
Start: 2022-08-27 | End: 2022-08-30

## 2022-08-27 RX ORDER — ENOXAPARIN SODIUM 100 MG/ML
40 INJECTION SUBCUTANEOUS EVERY 12 HOURS
Refills: 0 | Status: DISCONTINUED | OUTPATIENT
Start: 2022-08-27 | End: 2022-08-30

## 2022-08-27 RX ORDER — ONDANSETRON 8 MG/1
4 TABLET, FILM COATED ORAL EVERY 8 HOURS
Refills: 0 | Status: DISCONTINUED | OUTPATIENT
Start: 2022-08-27 | End: 2022-08-30

## 2022-08-27 RX ORDER — IBUPROFEN 200 MG
600 TABLET ORAL EVERY 6 HOURS
Refills: 0 | Status: DISCONTINUED | OUTPATIENT
Start: 2022-08-27 | End: 2022-08-30

## 2022-08-27 RX ORDER — TRAMADOL HYDROCHLORIDE 50 MG/1
25 TABLET ORAL ONCE
Refills: 0 | Status: DISCONTINUED | OUTPATIENT
Start: 2022-08-27 | End: 2022-08-27

## 2022-08-27 RX ORDER — ACETAMINOPHEN 500 MG
650 TABLET ORAL EVERY 6 HOURS
Refills: 0 | Status: DISCONTINUED | OUTPATIENT
Start: 2022-08-27 | End: 2022-08-28

## 2022-08-27 RX ORDER — ONDANSETRON 8 MG/1
4 TABLET, FILM COATED ORAL ONCE
Refills: 0 | Status: COMPLETED | OUTPATIENT
Start: 2022-08-27 | End: 2022-08-27

## 2022-08-27 RX ORDER — ACETAMINOPHEN 500 MG
1000 TABLET ORAL ONCE
Refills: 0 | Status: COMPLETED | OUTPATIENT
Start: 2022-08-27 | End: 2022-08-27

## 2022-08-27 RX ORDER — TETANUS TOXOID, REDUCED DIPHTHERIA TOXOID AND ACELLULAR PERTUSSIS VACCINE, ADSORBED 5; 2.5; 8; 8; 2.5 [IU]/.5ML; [IU]/.5ML; UG/.5ML; UG/.5ML; UG/.5ML
0.5 SUSPENSION INTRAMUSCULAR ONCE
Refills: 0 | Status: COMPLETED | OUTPATIENT
Start: 2022-08-27 | End: 2022-08-27

## 2022-08-27 RX ORDER — TOPIRAMATE 25 MG
50 TABLET ORAL DAILY
Refills: 0 | Status: DISCONTINUED | OUTPATIENT
Start: 2022-08-27 | End: 2022-08-30

## 2022-08-27 RX ORDER — HYDROCHLOROTHIAZIDE 25 MG
12.5 TABLET ORAL DAILY
Refills: 0 | Status: DISCONTINUED | OUTPATIENT
Start: 2022-08-27 | End: 2022-08-30

## 2022-08-27 RX ORDER — TRAMADOL HYDROCHLORIDE 50 MG/1
50 TABLET ORAL EVERY 6 HOURS
Refills: 0 | Status: DISCONTINUED | OUTPATIENT
Start: 2022-08-27 | End: 2022-08-29

## 2022-08-27 RX ORDER — KETOROLAC TROMETHAMINE 30 MG/ML
15 SYRINGE (ML) INJECTION ONCE
Refills: 0 | Status: DISCONTINUED | OUTPATIENT
Start: 2022-08-27 | End: 2022-08-27

## 2022-08-27 RX ADMIN — ONDANSETRON 4 MILLIGRAM(S): 8 TABLET, FILM COATED ORAL at 16:22

## 2022-08-27 RX ADMIN — TETANUS TOXOID, REDUCED DIPHTHERIA TOXOID AND ACELLULAR PERTUSSIS VACCINE, ADSORBED 0.5 MILLILITER(S): 5; 2.5; 8; 8; 2.5 SUSPENSION INTRAMUSCULAR at 12:46

## 2022-08-27 RX ADMIN — TRAMADOL HYDROCHLORIDE 25 MILLIGRAM(S): 50 TABLET ORAL at 16:51

## 2022-08-27 RX ADMIN — Medication 15 MILLIGRAM(S): at 17:02

## 2022-08-27 RX ADMIN — Medication 400 MILLIGRAM(S): at 16:22

## 2022-08-27 RX ADMIN — TRAMADOL HYDROCHLORIDE 25 MILLIGRAM(S): 50 TABLET ORAL at 12:45

## 2022-08-27 NOTE — H&P ADULT - HISTORY OF PRESENT ILLNESS
Patient is a 61 year old female w/pmh significant for HTN, JOSE LUIS (not on CPAP), mild asthma, OA s/p right THR (2017 at [facility] )and Total revision of hip replacement (Broken internal right hip prosthesis) 02-May-2022, presents after fall complaining of right upper leg pain    Patient is a 61 year old female w/pmh significant for HTN, JOSE LUIS (not on CPAP), mild asthma, OA s/p right THR (2017 at [facility] )and Total revision of hip replacement (Broken internal right hip prosthesis) 02-May-2022, presents after fall complaining of right upper leg pain.    On day prior to admission , patient reports her right leg giving out and falling down while pushing a shopping cart to her building entrance , she cannot recall if she lost consciousness or sustained and head injury , she reports sustaining trauma to her right thigh , and upper extremity. She reports no preceding chest pain , no SOB , no palpitations , no lightheadedness , no focal motor weakness , no new sensory deficits , no seizure like activity.  She was assisted to her apartment by responding police officers and was assisted to her couch . Patient reports she has been unable to get up from the couch since then  because pain is too severe, exacerbated with weight bearing ,responds minimally to ibuprofen.

## 2022-08-27 NOTE — ED PROVIDER NOTE - NS ED ATTENDING STATEMENT MOD
This was a shared visit with the ANNALISA. I reviewed and verified the documentation and independently performed the documented:

## 2022-08-27 NOTE — ED PROVIDER NOTE - ATTENDING APP SHARED VISIT CONTRIBUTION OF CARE
Attending Statement (ESTELA Cortes MD):    HPI: 62y/o F with h/o HTN, JOSE LUIS, asthma, OA, s/p R THR (2017 HSS, w/ revision May 2022 Dr Byrnes [facility]), presenting with right upper leg pain / thigh pain after fall last night; states her leg "gave out" and she fell onto floor; screamed, neighbor called 911, and police arrived, helped her to her couch; was still unable to get off couch/walk today, so called 911 and EMS arrived, brought her to hospital.  Abrasion/cut to right elbow    Review of Systems:  -General: no fever or chills  -ENT: no congestion, no difficulty swallowing  -Pulmonary: no cough, no shortness of breath  -Cardiac: no chest pain, no palpitations  -Gastrointestinal: no abdominal pain, no nausea, no vomiting, and no diarrhea.  -Genitourinary: no blood or pain with urination  -Musculoskeletal: see hpi  -Skin: abrasion to R elbow  -Endocrine: No h/o diabetes  -Neurologic: No new weakness or numbness in extremities    All else negative unless otherwise specified elsewhere in this note.    PSH/PMH as noted above    On Physical Exam:  General: well appearing, in NAD, speaking clearly in full sentences and without difficulty; cooperative with exam  HEENT: PERRL, MMM  Neck: no neck tenderness, no nuchal rigidity  Cardiac: normal s1, s2; RRR; no MGR  Lungs: CTABL  Abdomen: soft nontender/nondistended  : no bladder tenderness or distension  Extremities: no peripheral edema, no gross deformities;   -RUE: R elbow: linear 3cm abrasion, no laceration or active bleeding; R shoulder: some pain with movement but FROM and no deformity;  -RLE: R hip: no deformity, severely limited flexion/extension (due to pain); tenderness in mid/distal thigh (anterior/lateral); no knee tenderness/deformity, no ankle tenderness/deformity; dp/pt pulses palpable b/l.  Neuro: no gross neurologic deficits    MDM: 82y/o F with h/o HTN, JOSE LUIS, asthma, OA, s/p R THR (2017 HSS, w/ revision May 2022 Dr Byrnes [facility]), presenting with right upper leg pain / thigh pain after fall last night; concern for R hip/femur fracture; low suspicion for R shoulder fracture; obtain xrays; disposition after review of x-rays. Please see above progress notes above for updates to medical decision making and the patient's clinical course. Attending Statement (ESTELA Cortes MD):    HPI: 60y/o F with h/o HTN, JOSE LUIS, asthma, OA, s/p R THR (2017 HSS, w/ revision May 2022 Dr Byrnes [facility]), presenting with right upper leg pain / thigh pain after fall last night; states her leg "gave out" and she fell onto floor; screamed, neighbor called 911, and police arrived, helped her to her couch; was still unable to get off couch/walk today, so called 911 and EMS arrived, brought her to hospital.  Abrasion/cut to right elbow    Review of Systems:  -General: no fever or chills  -ENT: no congestion, no difficulty swallowing  -Pulmonary: no cough, no shortness of breath  -Cardiac: no chest pain, no palpitations  -Gastrointestinal: no abdominal pain, no nausea, no vomiting, and no diarrhea.  -Genitourinary: no blood or pain with urination  -Musculoskeletal: see hpi  -Skin: abrasion to R elbow  -Endocrine: No h/o diabetes  -Neurologic: No new weakness or numbness in extremities    All else negative unless otherwise specified elsewhere in this note.    PSH/PMH as noted above    On Physical Exam:  General: well appearing, in NAD, speaking clearly in full sentences and without difficulty; cooperative with exam  HEENT: PERRL, MMM  Neck: no neck tenderness, no nuchal rigidity  Cardiac: normal s1, s2; RRR; no MGR  Lungs: CTABL  Abdomen: soft nontender/nondistended  : no bladder tenderness or distension  Extremities: no peripheral edema, no gross deformities;   -RUE: R elbow: linear 3cm abrasion, no laceration or active bleeding; R shoulder: some pain with movement but FROM and no deformity;  -RLE: R hip: no deformity, severely limited flexion/extension (due to pain); tenderness in mid/distal thigh (anterior/lateral); no knee tenderness/deformity, no ankle tenderness/deformity; dp/pt pulses palpable b/l.  Neuro: no gross neurologic deficits    MDM: 82y/o F with h/o HTN, JOSE LUIS, asthma, OA, s/p R THR (2017 HSS, w/ revision May 2022 Dr Byrnes [facility]), presenting with right upper leg pain / thigh pain after fall last night; concern for R hip/femur fracture; low suspicion for R shoulder fracture; obtain xrays; disposition after review of x-rays. Patient requesting tramadol as that is the only medication stronger than tylenol that she tolerates.  Please see above progress notes above for updates to medical decision making and the patient's clinical course.

## 2022-08-27 NOTE — ED PROVIDER NOTE - CARE PLAN
Principal Discharge DX:	Fracture, proximal femur  Goal:	chronic femur fracture  Secondary Diagnosis:	Difficulty walking   1

## 2022-08-27 NOTE — H&P ADULT - NSICDXPASTSURGICALHX_GEN_ALL_CORE_FT
PAST SURGICAL HISTORY:  H/O  section     History of bunionectomy right foot     History of total right hip replacement 2017 at [facility]    S/P excision of lipoma [phone]

## 2022-08-27 NOTE — H&P ADULT - NSICDXPASTMEDICALHX_GEN_ALL_CORE_FT
PAST MEDICAL HISTORY:  Broken internal right hip prosthesis, initial encounter     Failure of right total hip arthroplasty, initial encounter     Hypertension     Mild asthma     OA (osteoarthritis)     JOSE LUIS (obstructive sleep apnea)

## 2022-08-27 NOTE — H&P ADULT - NSHPPHYSICALEXAM_GEN_ALL_CORE
Vital Signs Last 24 Hrs  T(C): 36.7 (27 Aug 2022 19:07), Max: 36.7 (27 Aug 2022 19:07)  T(F): 98 (27 Aug 2022 19:07), Max: 98 (27 Aug 2022 19:07)  HR: 72 (27 Aug 2022 19:07) (72 - 102)  BP: 165/104 (27 Aug 2022 19:07) (165/104 - 170/100)  BP(mean): --  RR: 16 (27 Aug 2022 19:07) (16 - 18)  SpO2: 98% (27 Aug 2022 19:07) (98% - 98%)    Parameters below as of 27 Aug 2022 19:07  Patient On (Oxygen Delivery Method): room air Vital Signs Last 24 Hrs  T(C): 36.7 (27 Aug 2022 19:07), Max: 36.7 (27 Aug 2022 19:07)  T(F): 98 (27 Aug 2022 19:07), Max: 98 (27 Aug 2022 19:07)  HR: 72 (27 Aug 2022 19:07) (72 - 102)  BP: 165/104 (27 Aug 2022 19:07) (165/104 - 170/100)  BP(mean): --  RR: 16 (27 Aug 2022 19:07) (16 - 18)  SpO2: 98% (27 Aug 2022 19:07) (98% - 98%)    Parameters below as of 27 Aug 2022 19:07  Patient On (Oxygen Delivery Method): room air    GENERAL: No acute distress, well-developed  HEAD:  Atraumatic, Normocephalic  ENT: EOMI, PERRLA, conjunctiva and sclera clear,  moist mucosa no pharyngeal erythema or exudates   NECK: supple , no JVD   CHEST/LUNG: Clear to auscultation bilaterally; No wheeze, equal breath sounds bilaterally   BACK: No spinal tenderness,  No CVA tenderness   HEART: Regular rate and rhythm; No murmurs, rubs, or gallops  ABDOMEN: Soft, Nontender, Nondistended; Bowel sounds present  EXTREMITIES: No clubbing, cyanosis, or edema  MSK: TTP over the bony prominences of the hip No other joint swelling or effusions,  NEUROLOGY: AAOx3, non-focal, cranial nerves intact  SKIN: Normal color, No rashes or lesions

## 2022-08-27 NOTE — H&P ADULT - PROBLEM SELECTOR PLAN 1
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 Patient currently with limited ability to weight bear or ambulate , will likely require PT/ Rehab   - orthopedics consult appreciated, day team to follow up further recommendations  - Tramadol / tylenol/ ibuprofen   - PT  - WBAT

## 2022-08-27 NOTE — ED ADULT NURSE NOTE - NSIMPLEMENTINTERV_GEN_ALL_ED
Implemented All Fall Risk Interventions:  [place] to call system. Call bell, personal items and telephone within reach. Instruct patient to call for assistance. Room bathroom lighting operational. Non-slip footwear when patient is off stretcher. Physically safe environment: no spills, clutter or unnecessary equipment. Stretcher in lowest position, wheels locked, appropriate side rails in place. Provide visual cue, wrist band, yellow gown, etc. Monitor gait and stability. Monitor for mental status changes and reorient to person, place, and time. Review medications for side effects contributing to fall risk. Reinforce activity limits and safety measures with patient and family.

## 2022-08-27 NOTE — H&P ADULT - NSICDXFAMILYHX_GEN_ALL_CORE_FT
FAMILY HISTORY:  Father  Still living? Unknown  FH: arthritis, Age at diagnosis: Age Unknown  FH: epilepsy, Age at diagnosis: Age Unknown    Mother  Still living? Unknown  FH: hypertension, Age at diagnosis: Age Unknown  FHx: malignant neoplasm, Age at diagnosis: Age Unknown    Grandparent  Still living? Unknown  FH: diabetes mellitus, Age at diagnosis: Age Unknown

## 2022-08-27 NOTE — ED PROVIDER NOTE - PHYSICAL EXAMINATION
NAD. Tachycardia, Hypertensive, Afebrile. +PERRL, EOMI. Neck supple. No spinal tender. +Mild right para thoracic tender without lesions. +Right shoulder tender with full ROMs. +Superficial abrasion on olecranon without vicky tender. Lungs clear. ABD soft, non tender.  +Right ant femur tender and diminished ROMs of right hip. No knee tender. N/V- intact. No focal neuro deficit.

## 2022-08-27 NOTE — ED PROVIDER NOTE - PROGRESS NOTE DETAILS
Pt's seen by ortho and recommended femur CT. Attending note (Sebastian): concern for periprosthetic fracture; xrays reviewed with ortho residents who recommended CT for further evaluation; will obtain CT and discuss with ortho further for final recommendations; anticipate need for admission as nonambulatory, screening labs, ecg, and covid pcr ordered. No orthopedic surgical intervention required at this time. Pt's still unable to weight bear to right leg and recommended admission for pain control and PT.

## 2022-08-27 NOTE — H&P ADULT - NSHPLABSRESULTS_GEN_ALL_CORE
Labs personally reviewed:                          12.8   6.01  )-----------( 334      ( 27 Aug 2022 15:18 )             40.6     08-27    139  |  104  |  22  ----------------------------<  87  3.9   |  24  |  0.61    Ca    9.7      27 Aug 2022 15:18    TPro  7.3  /  Alb  4.5  /  TBili  0.3  /  DBili  x   /  AST  19  /  ALT  20  /  AlkPhos  162<H>  08-27        LIVER FUNCTIONS - ( 27 Aug 2022 15:18 )  Alb: 4.5 g/dL / Pro: 7.3 g/dL / ALK PHOS: 162 U/L / ALT: 20 U/L / AST: 19 U/L / GGT: x           PT/INR - ( 27 Aug 2022 15:18 )   PT: 11.1 sec;   INR: 0.96 ratio         PTT - ( 27 Aug 2022 15:18 )  PTT:27.4 sec    CAPILLARY BLOOD GLUCOSE          Imaging:  CXR personally reviewed: no focal opacity  CT femur right: 1. Normal alignment of the right hip arthroplasty prosthesis.  2. Stable postoperative changes in the proximal femur.    CT pelvis: 1. Stable changes of prior right hip arthroplasty and chronic right femur  fracture.  2. Advanced degenerative changes in the spine and pelvis.\    Xray:  Left hip replacement with displaced mid femoral osseos   fragment concerning for fracture.  Xray R shoulder: No acute fracture or dislocation. Degenerative changes   about the glenohumeral joint.      EKG

## 2022-08-27 NOTE — ED PROVIDER NOTE - SHIFT CHANGE DETAILS
Attending note (Sebastian): I have endorsed this patient to the incoming physician, including clinical history, physical exam, current results and assessment/plan.

## 2022-08-27 NOTE — H&P ADULT - NSHPREVIEWOFSYSTEMS_GEN_ALL_CORE
CONSTITUTIONAL: + weakness, no fevers or chills  EYES/ENT: No visual changes;  No dysphagia  NECK: No pain or stiffness  RESPIRATORY: No cough, wheezing, hemoptysis; No shortness of breath  CARDIOVASCULAR: No chest pain or palpitations; No lower extremity edema  EXTREMITIES: no le edema, cyanosis, clubbing  GASTROINTESTINAL: No abdominal or epigastric pain. No nausea, vomiting, or hematemesis; No diarrhea or constipation. No melena or hematochezia.  BACK: No back pain  GENITOURINARY: No dysuria, frequency or hematuria  NEUROLOGICAL: No numbness or weakness  MSK:+ right hip and thigh  pain  SKIN: No itching, burning, rashes, or lesions   PSYCH: no agitation  All other review of systems is negative unless indicated above.

## 2022-08-27 NOTE — H&P ADULT - ASSESSMENT
61 year old female w/pmh significant for HTN, JOSE LUIS (not on CPAP), mild asthma, OA s/p right THR (2017 at [facility] )and Total revision of hip replacement (Broken internal right hip prosthesis) 02-May-2022, presents after fall complaining of right upper leg pain.

## 2022-08-27 NOTE — H&P ADULT - NSHPSOCIALHISTORY_GEN_ALL_CORE
Social History:    Marital Status:  (   )    (  x ) Single    (   )    (  )   Occupation:   Lives with: ( x ) alone  (  ) children   (  ) spouse   (  ) parents  (  ) other    Substance Use (street drugs): (  ) never used  (  ) other:  Tobacco Usage:  (   ) never smoked   ( x  ) former smoker, quit 2010 , x20 yrs 1/2 ppd    (   ) current smoker  (     ) pack year  (        ) last cigarette date  Alcohol Usage: social  etoh use

## 2022-08-27 NOTE — CONSULT NOTE ADULT - SUBJECTIVE AND OBJECTIVE BOX
61y Female community ambulator without assistive device presents to the ED with right hip pain after mechanical fall. Patient has hx of R USHA at [facility] that was revised by Dr. Byrnes at [facility] on 5/2/22 for a fractured femoral prosthesis; this revision was performed through an extended trochanteric osteotomy. Postoperatively, she still had pain; at her 12w visit, plan was made for likely bone grafting of ETO nonunion. She was doing about the same until today when she was using a shopping cart, collapsed, and fell on to her right side. Patient denies headstrike or LOC. Patient noticed inability to ambulate secondary to pain from the affected side. Patient subsequently came to the ED for further evaluation and management. In the ED, endorses pain over the hip area. Patient denies any fevers, chills, numbness, tingling, weakness, or any other orthopaedic complaint.     Physical Exam  Vital Signs Last 24 Hrs  T(C): 36.7 (08-27-22 @ 19:07), Max: 36.7 (08-27-22 @ 19:07)  T(F): 98 (08-27-22 @ 19:07), Max: 98 (08-27-22 @ 19:07)  HR: 72 (08-27-22 @ 19:07) (72 - 102)  BP: 165/104 (08-27-22 @ 19:07) (165/104 - 170/100)  BP(mean): --  RR: 16 (08-27-22 @ 19:07) (16 - 18)  SpO2: 98% (08-27-22 @ 19:07) (98% - 98%)    Gen: Resting in bed, NAD  R LE:   Healed surgical incision over the right hip, c/d/i.   TTP over the bony prominences of the hip. NTTP throughout the rest of the extremity. Negative log roll or axial load.   SILT L2-S1  GSC/TA/EHL/FHL intact; Q/H unable to assess 2/2 pain.   DP pulse palpable.   No calf tenderness bilaterally.   Compartments soft and compressible.     Secondary Assessment:  NC/AT, NTTP of clavicles, NTTP of C-spine,T-spine, or L-spine in the midline and paraspinal areas; NTTP of pelvis  LUE: NTTP of Shoulder, Elbow, Wrist, Hand; NT with AROM/PROM of Shoulder, Elbow, Wrist, Hand; AIN/PIN/Med/Uln/Msc/Rad/Ax intact.   RUE: NTTP of Shoulder, Elbow, Wrist, Hand; NT with AROM/PROM of Shoulder, Elbow, Wrist, Hand; AIN/PIN/Med/Uln/Msc/Rad/Ax intact. Superficial laceration over the olecranon.   LLE: Able to SLR, NT with Log Roll, NT with Heel Strike, NTTP of Hip, Knee, Ankle, Foot; NT with AROM/PROM of Hip, Knee, Ankle, Foot; Q/H/GSC/TA/EHL/FHL intact                          12.8   6.01  )-----------( 334      ( 27 Aug 2022 15:18 )             40.6     27 Aug 2022 15:18    139    |  104    |  22     ----------------------------<  87     3.9     |  24     |  0.61     Ca    9.7        27 Aug 2022 15:18    TPro  7.3    /  Alb  4.5    /  TBili  0.3    /  DBili  x      /  AST  19     /  ALT  20     /  AlkPhos  162    27 Aug 2022 15:18    PT/INR - ( 27 Aug 2022 15:18 )   PT: 11.1 sec;   INR: 0.96 ratio         PTT - ( 27 Aug 2022 15:18 )  PTT:27.4 sec    Imaging: XR, CT reviewed demonstrating 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     Imaging findings reviewed and discussed with the patient.   WBAT/PT/OT  Pain management PRN  No acute orthopaedic surgical intervention indicated at this time. This patient is orthopaedically stable for discharge.   Patient to follow up with Dr. Byrnes as an outpatient for further evaluation and management.   All of the patient's questions and concerns were answered and addressed.   Will discuss with Kaden Galvez and Soniya and will advise for any changes to the plan.      61y Female community ambulator without assistive device presents to the ED with right hip pain after mechanical fall. Patient has hx of R USHA at [facility] that was revised by Dr. Byrnes at [facility] on 5/2/22 for a fractured femoral prosthesis; this revision was performed through an extended trochanteric osteotomy. Postoperatively, she still had pain; at her 12w visit, plan was made for likely bone grafting of ETO nonunion. She was doing about the same until today when she was using a shopping cart, collapsed, and fell on to her right side. Patient denies headstrike or LOC. Patient noticed inability to ambulate secondary to pain from the affected side. Patient subsequently came to the ED for further evaluation and management. In the ED, endorses pain over the hip area. Patient denies any fevers, chills, numbness, tingling, weakness, or any other orthopaedic complaint.     Physical Exam  Vital Signs Last 24 Hrs  T(C): 36.7 (08-27-22 @ 19:07), Max: 36.7 (08-27-22 @ 19:07)  T(F): 98 (08-27-22 @ 19:07), Max: 98 (08-27-22 @ 19:07)  HR: 72 (08-27-22 @ 19:07) (72 - 102)  BP: 165/104 (08-27-22 @ 19:07) (165/104 - 170/100)  BP(mean): --  RR: 16 (08-27-22 @ 19:07) (16 - 18)  SpO2: 98% (08-27-22 @ 19:07) (98% - 98%)    Gen: Resting in bed, NAD  R LE:   Healed surgical incision over the right hip, c/d/i.   TTP over the bony prominences of the hip. NTTP throughout the rest of the extremity. Negative log roll or axial load.   SILT L2-S1  GSC/TA/EHL/FHL intact; Q/H unable to assess 2/2 pain.   DP pulse palpable.   No calf tenderness bilaterally.   Compartments soft and compressible.     Secondary Assessment:  NC/AT, NTTP of clavicles, NTTP of C-spine,T-spine, or L-spine in the midline and paraspinal areas; NTTP of pelvis  LUE: NTTP of Shoulder, Elbow, Wrist, Hand; NT with AROM/PROM of Shoulder, Elbow, Wrist, Hand; AIN/PIN/Med/Uln/Msc/Rad/Ax intact.   RUE: NTTP of Shoulder, Elbow, Wrist, Hand; NT with AROM/PROM of Shoulder, Elbow, Wrist, Hand; AIN/PIN/Med/Uln/Msc/Rad/Ax intact. Superficial laceration over the olecranon.   LLE: Able to SLR, NT with Log Roll, NT with Heel Strike, NTTP of Hip, Knee, Ankle, Foot; NT with AROM/PROM of Hip, Knee, Ankle, Foot; Q/H/GSC/TA/EHL/FHL intact                          12.8   6.01  )-----------( 334      ( 27 Aug 2022 15:18 )             40.6     27 Aug 2022 15:18    139    |  104    |  22     ----------------------------<  87     3.9     |  24     |  0.61     Ca    9.7        27 Aug 2022 15:18    TPro  7.3    /  Alb  4.5    /  TBili  0.3    /  DBili  x      /  AST  19     /  ALT  20     /  AlkPhos  162    27 Aug 2022 15:18    PT/INR - ( 27 Aug 2022 15:18 )   PT: 11.1 sec;   INR: 0.96 ratio         PTT - ( 27 Aug 2022 15:18 )  PTT:27.4 sec    Imaging: XR, CT reviewed demonstrating 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     Imaging findings reviewed and discussed with the patient.   TTWB until pain improves, PT/OT  Pain management PRN  No acute orthopaedic surgical intervention indicated at this time. This patient is orthopaedically stable for discharge.   Patient to follow up with Dr. Byrnes as an outpatient for further evaluation and management.   All of the patient's questions and concerns were answered and addressed.   Will discuss with Kaden Galvez and Soniya and will advise for any changes to the plan.

## 2022-08-28 LAB
ALBUMIN SERPL ELPH-MCNC: 3.7 G/DL — SIGNIFICANT CHANGE UP (ref 3.3–5)
ALP SERPL-CCNC: 134 U/L — HIGH (ref 40–120)
ALT FLD-CCNC: 17 U/L — SIGNIFICANT CHANGE UP (ref 10–45)
ANION GAP SERPL CALC-SCNC: 9 MMOL/L — SIGNIFICANT CHANGE UP (ref 5–17)
AST SERPL-CCNC: 13 U/L — SIGNIFICANT CHANGE UP (ref 10–40)
BILIRUB SERPL-MCNC: 0.3 MG/DL — SIGNIFICANT CHANGE UP (ref 0.2–1.2)
BUN SERPL-MCNC: 19 MG/DL — SIGNIFICANT CHANGE UP (ref 7–23)
CALCIUM SERPL-MCNC: 9 MG/DL — SIGNIFICANT CHANGE UP (ref 8.4–10.5)
CHLORIDE SERPL-SCNC: 105 MMOL/L — SIGNIFICANT CHANGE UP (ref 96–108)
CO2 SERPL-SCNC: 25 MMOL/L — SIGNIFICANT CHANGE UP (ref 22–31)
CREAT SERPL-MCNC: 0.59 MG/DL — SIGNIFICANT CHANGE UP (ref 0.5–1.3)
EGFR: 102 ML/MIN/1.73M2 — SIGNIFICANT CHANGE UP
GLUCOSE SERPL-MCNC: 120 MG/DL — HIGH (ref 70–99)
HCT VFR BLD CALC: 36.7 % — SIGNIFICANT CHANGE UP (ref 34.5–45)
HCV AB S/CO SERPL IA: 0.08 S/CO — SIGNIFICANT CHANGE UP (ref 0–0.99)
HCV AB SERPL-IMP: SIGNIFICANT CHANGE UP
HGB BLD-MCNC: 12 G/DL — SIGNIFICANT CHANGE UP (ref 11.5–15.5)
MCHC RBC-ENTMCNC: 29.4 PG — SIGNIFICANT CHANGE UP (ref 27–34)
MCHC RBC-ENTMCNC: 32.7 GM/DL — SIGNIFICANT CHANGE UP (ref 32–36)
MCV RBC AUTO: 90 FL — SIGNIFICANT CHANGE UP (ref 80–100)
NRBC # BLD: 0 /100 WBCS — SIGNIFICANT CHANGE UP (ref 0–0)
PLATELET # BLD AUTO: 306 K/UL — SIGNIFICANT CHANGE UP (ref 150–400)
POTASSIUM SERPL-MCNC: 4 MMOL/L — SIGNIFICANT CHANGE UP (ref 3.5–5.3)
POTASSIUM SERPL-SCNC: 4 MMOL/L — SIGNIFICANT CHANGE UP (ref 3.5–5.3)
PROT SERPL-MCNC: 6.6 G/DL — SIGNIFICANT CHANGE UP (ref 6–8.3)
RBC # BLD: 4.08 M/UL — SIGNIFICANT CHANGE UP (ref 3.8–5.2)
RBC # FLD: 14.5 % — SIGNIFICANT CHANGE UP (ref 10.3–14.5)
SODIUM SERPL-SCNC: 139 MMOL/L — SIGNIFICANT CHANGE UP (ref 135–145)
WBC # BLD: 5.38 K/UL — SIGNIFICANT CHANGE UP (ref 3.8–10.5)
WBC # FLD AUTO: 5.38 K/UL — SIGNIFICANT CHANGE UP (ref 3.8–10.5)

## 2022-08-28 PROCEDURE — 93010 ELECTROCARDIOGRAM REPORT: CPT

## 2022-08-28 PROCEDURE — 99233 SBSQ HOSP IP/OBS HIGH 50: CPT

## 2022-08-28 RX ORDER — POLYETHYLENE GLYCOL 3350 17 G/17G
17 POWDER, FOR SOLUTION ORAL DAILY
Refills: 0 | Status: DISCONTINUED | OUTPATIENT
Start: 2022-08-28 | End: 2022-08-30

## 2022-08-28 RX ORDER — LOSARTAN POTASSIUM 100 MG/1
50 TABLET, FILM COATED ORAL DAILY
Refills: 0 | Status: DISCONTINUED | OUTPATIENT
Start: 2022-08-28 | End: 2022-08-30

## 2022-08-28 RX ORDER — ACETAMINOPHEN 500 MG
975 TABLET ORAL EVERY 12 HOURS
Refills: 0 | Status: DISCONTINUED | OUTPATIENT
Start: 2022-08-28 | End: 2022-08-30

## 2022-08-28 RX ORDER — SENNA PLUS 8.6 MG/1
2 TABLET ORAL AT BEDTIME
Refills: 0 | Status: DISCONTINUED | OUTPATIENT
Start: 2022-08-28 | End: 2022-08-30

## 2022-08-28 RX ORDER — LIDOCAINE 4 G/100G
1 CREAM TOPICAL DAILY
Refills: 0 | Status: DISCONTINUED | OUTPATIENT
Start: 2022-08-28 | End: 2022-08-30

## 2022-08-28 RX ADMIN — TRAMADOL HYDROCHLORIDE 50 MILLIGRAM(S): 50 TABLET ORAL at 22:22

## 2022-08-28 RX ADMIN — Medication 600 MILLIGRAM(S): at 06:49

## 2022-08-28 RX ADMIN — Medication 12.5 MILLIGRAM(S): at 06:04

## 2022-08-28 RX ADMIN — Medication 975 MILLIGRAM(S): at 18:02

## 2022-08-28 RX ADMIN — Medication 600 MILLIGRAM(S): at 19:31

## 2022-08-28 RX ADMIN — LIDOCAINE 1 PATCH: 4 CREAM TOPICAL at 20:05

## 2022-08-28 RX ADMIN — Medication 600 MILLIGRAM(S): at 11:37

## 2022-08-28 RX ADMIN — ENOXAPARIN SODIUM 40 MILLIGRAM(S): 100 INJECTION SUBCUTANEOUS at 06:19

## 2022-08-28 RX ADMIN — TRAMADOL HYDROCHLORIDE 50 MILLIGRAM(S): 50 TABLET ORAL at 02:44

## 2022-08-28 RX ADMIN — ONDANSETRON 4 MILLIGRAM(S): 8 TABLET, FILM COATED ORAL at 06:05

## 2022-08-28 RX ADMIN — ENOXAPARIN SODIUM 40 MILLIGRAM(S): 100 INJECTION SUBCUTANEOUS at 16:37

## 2022-08-28 RX ADMIN — Medication 600 MILLIGRAM(S): at 20:01

## 2022-08-28 RX ADMIN — Medication 600 MILLIGRAM(S): at 06:19

## 2022-08-28 RX ADMIN — Medication 50 MILLIGRAM(S): at 11:37

## 2022-08-28 RX ADMIN — SENNA PLUS 2 TABLET(S): 8.6 TABLET ORAL at 21:52

## 2022-08-28 RX ADMIN — LOSARTAN POTASSIUM 50 MILLIGRAM(S): 100 TABLET, FILM COATED ORAL at 15:09

## 2022-08-28 RX ADMIN — TRAMADOL HYDROCHLORIDE 50 MILLIGRAM(S): 50 TABLET ORAL at 21:52

## 2022-08-28 RX ADMIN — Medication 975 MILLIGRAM(S): at 16:37

## 2022-08-28 RX ADMIN — LIDOCAINE 1 PATCH: 4 CREAM TOPICAL at 15:09

## 2022-08-28 RX ADMIN — Medication 600 MILLIGRAM(S): at 12:00

## 2022-08-28 RX ADMIN — TRAMADOL HYDROCHLORIDE 50 MILLIGRAM(S): 50 TABLET ORAL at 03:20

## 2022-08-28 NOTE — PHYSICAL THERAPY INITIAL EVALUATION ADULT - ADDITIONAL COMMENTS
lives alone in apartment with no steps to enter but either 5 steps with bilateral rails (far apart) or a "steep" ramp within the building, pt's apartment is at the end of a long hallway, +spiral staircase with 1 rail to 2nd floor of duplex where pt's bedroom and full bathroom are located, right hand dominant

## 2022-08-28 NOTE — PATIENT PROFILE ADULT - FALL HARM RISK - HARM RISK INTERVENTIONS
Assistance with ambulation/Assistance OOB with selected safe patient handling equipment/Communicate Risk of Fall with Harm to all staff/Discuss with provider need for PT consult/Monitor gait and stability/Provide patient with walking aids - walker, cane, crutches/Reinforce activity limits and safety measures with patient and family/Sit up slowly, dangle for a short time, stand at bedside before walking/Tailored Fall Risk Interventions/Use of alarms - bed, chair and/or voice tab/Visual Cue: Yellow wristband and red socks/Bed in lowest position, wheels locked, appropriate side rails in place/Call bell, personal items and telephone in reach/Instruct patient to call for assistance before getting out of bed or chair/Non-slip footwear when patient is out of bed/[place] to call system/Physically safe environment - no spills, clutter or unnecessary equipment/Purposeful Proactive Rounding/Room/bathroom lighting operational, light cord in reach

## 2022-08-28 NOTE — PHYSICAL THERAPY INITIAL EVALUATION ADULT - PERTINENT HX OF CURRENT PROBLEM, REHAB EVAL
PMHx: HTN, JOSE LUIS (not on CPAP), mild asthma, OA s/p R THR (2017) & Total revision of hip replacement (Broken internal R hip prosthesis) 02-May-2022. On day prior to admission, pt reports RLE giving out & falling down while pushing a shopping cart to her building entrance, unknown LOC or if pt sustained head injury, c/o trauma to R thigh & RUE. Was assisted to her apartment by responding police officers and was assisted to her couch. Pt has been unable to get up from the couch since then due to severe pain, exacerbated with weight bearing, responds minimally to ibuprofen.    Ortho consult: Pt with h/o R USHA that was revised at [facility] on 5/2/22 for a fractured femoral prosthesis; this revision was performed through an extended trochanteric osteotomy. 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 She was doing about the same until today when she was using a shopping cart, collapsed, and fell on to her right side. 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 TTWB RLE    CT R femur: R hip arthroplasty prosthesis is in expected position with normal alignment. Heterotopic ossification in the region of the R proximal femur fracture. No new fracture is seen. Normal alignment. No signs of osteomyelitis. Distal femur is intact. Advanced osteoarthritis at the knee. CT pelvis: Stable changes of prior R hip arthroplasty & chronic R femur fracture. Advanced degenerative changes in the spine and pelvis. PMHx: HTN, JOSE LUIS (not on CPAP), mild asthma, OA s/p R THR (2017) & Total revision of hip replacement (Broken internal R hip prosthesis) 02-May-2022. On day prior to admission, pt reports RLE giving out & falling down while pushing a shopping cart to her building entrance, unknown LOC or if pt sustained head injury, c/o trauma to R thigh & RUE. Was assisted to her apartment by responding police officers and was assisted to her couch. Pt has been unable to get up from the couch since then due to severe pain, exacerbated with weight bearing, responds minimally to ibuprofen.    Ortho consult: Pt with h/o R USHA that was revised at [facility] on 5/2/22 for a fractured femoral prosthesis; this revision was performed through an extended trochanteric osteotomy. 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 She was doing about the same until today when she was using a shopping cart, collapsed, and fell on to her right side. 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 TTWB RLE    CT R femur: R hip arthroplasty prosthesis is in expected position with normal alignment. Heterotopic ossification in the region of the R proximal femur fracture. No new fracture is seen. Normal alignment. No signs of osteomyelitis. Distal femur is intact. Advanced osteoarthritis at the knee. CT pelvis: Stable changes of prior R hip arthroplasty & chronic R femur fracture. Advanced degenerative changes in the spine and pelvis.  x-ray R femur: S/p total R hip replacement. There is a periprosthetic fracture in the R proximal femur. There is marked osteoarthrosis of the left hip.  R shoulder x-ray: No fracture or dislocation.

## 2022-08-28 NOTE — PATIENT PROFILE ADULT - VISION (WITH CORRECTIVE LENSES IF THE PATIENT USUALLY WEARS THEM):
H&P Exam - Gynecology   Evan Ayala 36 y o  female MRN: [id number]  Unit/Bed#: S -01 Encounter: [id number]    Patient of Dr Tatyana Slaughter      History of Present Illness     HPI:  Evan Ayala is a 36 y o  P1 with LMP "a couple weeks ago" who presents as a direct admit from [place] [facility] with left lower quadrant pain that has been going on for the last several weeks  She states that the pain worsened over the last few days  She states that the pain is sharp and cramping in nature  She rates it a 7/10 at its worse  Pain is worsened by sexual intercourse and walking and is improved with Tylenol  She states that she is not currently having pain  She had nausea earlier yesterday and self reported chills  She has been afebrile since presentation to the ED  She is not currently using any form of birth control but states that she has used Depo in the past   She is currently sexually active with 1 male partner and reports dyspareunia  Mandy Carrasco reports that she has issues with constipation and her last bowel movement was several days ago  She reports irregular periods that can be heavy at times  She denies chest pain, SOB or any additional symptoms at this time  Review of Systems   Gastrointestinal: Positive for abdominal pain, constipation and nausea  Negative for vomiting  Historical Information   Past Medical History:   Diagnosis Date    Asthma     Back pain     History of thyroid disorder     History of tinea cruris     History of vaginal discharge     Microscopic hematuria     History    Papilloma of tongue     History    Vaginal candidiasis     History     No past surgical history on file    OB/GYN History: 1 prior vaginal delivery  Family History   Problem Relation Age of Onset    Hypertension Mother     Mental illness Mother         Disorder    Substance Abuse Mother     Breast cancer Mother 27    Thyroid cancer Mother         Metastasis from thyroid cancer    Ovarian cancer Mother 48    Diabetes type I Mother         mellitus with other kidney complication    Diabetes type II Mother         without complication, without long term current use of insulin     Cancer Father     Hypertension Maternal Grandmother     Cancer Paternal Grandfather     Ovarian cancer Maternal Aunt      Social History   Social History     Substance and Sexual Activity   Alcohol Use No     Social History     Substance and Sexual Activity   Drug Use No     Social History     Tobacco Use   Smoking Status Never Smoker   Smokeless Tobacco Never Used       Meds/Allergies   Medications Prior to Admission   Medication    albuterol (5 mg/mL) 0 5 % nebulizer solution    albuterol (VENTOLIN HFA) 90 mcg/act inhaler    cyclobenzaprine (FLEXERIL) 5 mg tablet     No Known Allergies    Objective   /90 (BP Location: Left arm)   Pulse 84   Temp 98 4 °F (36 9 °C) (Oral)   Resp 18   Ht 5' 4" (1 626 m)   Wt 54 5 kg (120 lb 2 4 oz)   SpO2 98%   BMI 20 62 kg/m²     No intake or output data in the 24 hours ending 01/25/20 2043    Invasive Devices: Invasive Devices     Peripheral Intravenous Line            Peripheral IV 01/25/20 Right Antecubital less than 1 day                Physical Exam   Constitutional: She is oriented to person, place, and time  She appears well-developed and well-nourished  No distress  HENT:   Head: Normocephalic and atraumatic  Neck: Normal range of motion  Neck supple  No thyromegaly present  Cardiovascular: Normal rate and regular rhythm  Pulmonary/Chest: Effort normal and breath sounds normal  No respiratory distress  She exhibits no tenderness  Breast exam-no palpable masses, lesions, discharge, erythema or skin changes   Abdominal: Soft  She exhibits no distension and no mass  There is no tenderness  Genitourinary:   Genitourinary Comments: Vaginal exam deferred per patient request   Musculoskeletal: Normal range of motion     Neurological: She is alert and oriented to person, place, and time  Skin: Skin is warm and dry  She is not diaphoretic  Psychiatric: She has a normal mood and affect  Her behavior is normal        Lab Results:   Admission on 01/25/2020, Discharged on 01/25/2020   Component Date Value    WBC 01/25/2020 6 20     RBC 01/25/2020 4 42     Hemoglobin 01/25/2020 13 1     Hematocrit 01/25/2020 39 8     MCV 01/25/2020 90     MCH 01/25/2020 29 6     MCHC 01/25/2020 32 9     RDW 01/25/2020 13 4     MPV 01/25/2020 10 8     Platelets 83/25/7763 353     nRBC 01/25/2020 0     Neutrophils Relative 01/25/2020 55     Immat GRANS % 01/25/2020 0     Lymphocytes Relative 01/25/2020 33     Monocytes Relative 01/25/2020 9     Eosinophils Relative 01/25/2020 2     Basophils Relative 01/25/2020 1     Neutrophils Absolute 01/25/2020 3 46     Immature Grans Absolute 01/25/2020 0 02     Lymphocytes Absolute 01/25/2020 2 02     Monocytes Absolute 01/25/2020 0 57     Eosinophils Absolute 01/25/2020 0 10     Basophils Absolute 01/25/2020 0 03     Sodium 01/25/2020 140     Potassium 01/25/2020 3 5     Chloride 01/25/2020 107     CO2 01/25/2020 27     ANION GAP 01/25/2020 6     BUN 01/25/2020 12     Creatinine 01/25/2020 0 72     Glucose 01/25/2020 105     Calcium 01/25/2020 8 5     AST 01/25/2020 15     ALT 01/25/2020 29     Alkaline Phosphatase 01/25/2020 105     Total Protein 01/25/2020 7 8     Albumin 01/25/2020 3 8     Total Bilirubin 01/25/2020 0 27     eGFR 01/25/2020 103     Lipase 01/25/2020 124     Troponin I 01/25/2020 0 02     EXT PREG TEST UR (Ref: N* 01/25/2020 negative     Control 01/25/2020 valid     Ventricular Rate 01/25/2020 86     Atrial Rate 01/25/2020 86     CA Interval 01/25/2020 168     QRSD Interval 01/25/2020 98     QT Interval 01/25/2020 358     QTC Interval 01/25/2020 428     P Axis 01/25/2020 63     QRS Axis 01/25/2020 31     T Wave Anderson 01/25/2020 59       Imaging: I have personally reviewed pertinent reports  EKG, Pathology, and Other Studies: I have personally reviewed pertinent reports  Assessment/Plan     A/P: 37 yo premenopausal F with left lower abdominal pain  1) Abdominal Pain  -US findings with 2cm complex cyst on the left ovary  No evidence of torsion on US  Patient's pain has now resolved  -She will need follow US surveillance for left ovarian cyst   -will monitor overnight for symptoms with plan to d/c in the morning      2) FEN  -regular diet    3) DVT ppx: SCDs    Code Status: Full Code    Radha Sandoval MD, MPH  OB/GYN, PGY4  1/26/2020, 5:26 AM Normal vision: sees adequately in most situations; can see medication labels, newsprint

## 2022-08-29 PROCEDURE — 99233 SBSQ HOSP IP/OBS HIGH 50: CPT

## 2022-08-29 RX ORDER — LACTULOSE 10 G/15ML
10 SOLUTION ORAL ONCE
Refills: 0 | Status: COMPLETED | OUTPATIENT
Start: 2022-08-29 | End: 2022-08-30

## 2022-08-29 RX ORDER — OXYCODONE HYDROCHLORIDE 5 MG/1
2.5 TABLET ORAL EVERY 6 HOURS
Refills: 0 | Status: DISCONTINUED | OUTPATIENT
Start: 2022-08-29 | End: 2022-08-30

## 2022-08-29 RX ADMIN — Medication 50 MILLIGRAM(S): at 12:00

## 2022-08-29 RX ADMIN — OXYCODONE HYDROCHLORIDE 2.5 MILLIGRAM(S): 5 TABLET ORAL at 15:24

## 2022-08-29 RX ADMIN — LIDOCAINE 1 PATCH: 4 CREAM TOPICAL at 19:51

## 2022-08-29 RX ADMIN — ENOXAPARIN SODIUM 40 MILLIGRAM(S): 100 INJECTION SUBCUTANEOUS at 05:20

## 2022-08-29 RX ADMIN — SENNA PLUS 2 TABLET(S): 8.6 TABLET ORAL at 21:14

## 2022-08-29 RX ADMIN — POLYETHYLENE GLYCOL 3350 17 GRAM(S): 17 POWDER, FOR SOLUTION ORAL at 12:00

## 2022-08-29 RX ADMIN — LIDOCAINE 1 PATCH: 4 CREAM TOPICAL at 05:44

## 2022-08-29 RX ADMIN — LOSARTAN POTASSIUM 50 MILLIGRAM(S): 100 TABLET, FILM COATED ORAL at 05:19

## 2022-08-29 RX ADMIN — Medication 975 MILLIGRAM(S): at 05:19

## 2022-08-29 RX ADMIN — OXYCODONE HYDROCHLORIDE 2.5 MILLIGRAM(S): 5 TABLET ORAL at 21:13

## 2022-08-29 RX ADMIN — OXYCODONE HYDROCHLORIDE 2.5 MILLIGRAM(S): 5 TABLET ORAL at 14:48

## 2022-08-29 RX ADMIN — Medication 975 MILLIGRAM(S): at 17:16

## 2022-08-29 RX ADMIN — Medication 975 MILLIGRAM(S): at 05:49

## 2022-08-29 RX ADMIN — LIDOCAINE 1 PATCH: 4 CREAM TOPICAL at 12:00

## 2022-08-29 RX ADMIN — LIDOCAINE 1 PATCH: 4 CREAM TOPICAL at 23:22

## 2022-08-29 RX ADMIN — ENOXAPARIN SODIUM 40 MILLIGRAM(S): 100 INJECTION SUBCUTANEOUS at 17:16

## 2022-08-29 NOTE — CHART NOTE - NSCHARTNOTEFT_GEN_A_CORE
Case discussed with Dr. Byrnes of [facility]. Recommended medical workup and optimization in house at [facility]; and subsequent transfer to [facility] either 8/30 or 8/31 for plan for OR 9/1. Please document medical clearance as appropriate; please do not discharge patient to rehab. Will discuss plan with patient.

## 2022-08-29 NOTE — CHART NOTE - NSCHARTNOTEFT_GEN_A_CORE
Patient seen and examined at bedside. Pain controlled. Discussed need for operative intervention for ETO nonunion with the patient, and informed the patient that she needs to make an outpatient appointment with Dr. Byrnes to plan for surgery. All of the patient's questions and concerns answered and addressed.

## 2022-08-30 ENCOUNTER — NON-APPOINTMENT (OUTPATIENT)
Age: 62
End: 2022-08-30

## 2022-08-30 ENCOUNTER — TRANSCRIPTION ENCOUNTER (OUTPATIENT)
Age: 62
End: 2022-08-30

## 2022-08-30 ENCOUNTER — APPOINTMENT (OUTPATIENT)
Dept: ORTHOPEDIC SURGERY | Facility: CLINIC | Age: 62
End: 2022-08-30

## 2022-08-30 ENCOUNTER — INPATIENT (INPATIENT)
Facility: HOSPITAL | Age: 62
LOS: 7 days | Discharge: NURSING, REHAB OR LONG-TERM CARE | DRG: 481 | End: 2022-09-07
Attending: INTERNAL MEDICINE | Admitting: HOSPITALIST
Payer: COMMERCIAL

## 2022-08-30 VITALS
OXYGEN SATURATION: 96 % | TEMPERATURE: 98 F | SYSTOLIC BLOOD PRESSURE: 155 MMHG | DIASTOLIC BLOOD PRESSURE: 99 MMHG | HEART RATE: 72 BPM | RESPIRATION RATE: 18 BRPM

## 2022-08-30 VITALS
RESPIRATION RATE: 18 BRPM | HEART RATE: 74 BPM | DIASTOLIC BLOOD PRESSURE: 82 MMHG | SYSTOLIC BLOOD PRESSURE: 131 MMHG | TEMPERATURE: 98 F | OXYGEN SATURATION: 95 %

## 2022-08-30 DIAGNOSIS — Z98.891 HISTORY OF UTERINE SCAR FROM PREVIOUS SURGERY: Chronic | ICD-10-CM

## 2022-08-30 DIAGNOSIS — Z98.890 OTHER SPECIFIED POSTPROCEDURAL STATES: Chronic | ICD-10-CM

## 2022-08-30 DIAGNOSIS — Z01.818 ENCOUNTER FOR OTHER PREPROCEDURAL EXAMINATION: ICD-10-CM

## 2022-08-30 DIAGNOSIS — Z96.641 PRESENCE OF RIGHT ARTIFICIAL HIP JOINT: Chronic | ICD-10-CM

## 2022-08-30 DIAGNOSIS — M25.551 PAIN IN RIGHT HIP: ICD-10-CM

## 2022-08-30 LAB
ALBUMIN SERPL ELPH-MCNC: 3.9 G/DL — SIGNIFICANT CHANGE UP (ref 3.3–5)
ALP SERPL-CCNC: 130 U/L — HIGH (ref 40–120)
ALT FLD-CCNC: 15 U/L — SIGNIFICANT CHANGE UP (ref 10–45)
ANION GAP SERPL CALC-SCNC: 12 MMOL/L — SIGNIFICANT CHANGE UP (ref 5–17)
AST SERPL-CCNC: 14 U/L — SIGNIFICANT CHANGE UP (ref 10–40)
BILIRUB SERPL-MCNC: 0.2 MG/DL — SIGNIFICANT CHANGE UP (ref 0.2–1.2)
BUN SERPL-MCNC: 18 MG/DL — SIGNIFICANT CHANGE UP (ref 7–23)
CALCIUM SERPL-MCNC: 9.6 MG/DL — SIGNIFICANT CHANGE UP (ref 8.4–10.5)
CHLORIDE SERPL-SCNC: 106 MMOL/L — SIGNIFICANT CHANGE UP (ref 96–108)
CO2 SERPL-SCNC: 23 MMOL/L — SIGNIFICANT CHANGE UP (ref 22–31)
CREAT SERPL-MCNC: 0.79 MG/DL — SIGNIFICANT CHANGE UP (ref 0.5–1.3)
EGFR: 85 ML/MIN/1.73M2 — SIGNIFICANT CHANGE UP
GLUCOSE SERPL-MCNC: 99 MG/DL — SIGNIFICANT CHANGE UP (ref 70–99)
HCT VFR BLD CALC: 40.2 % — SIGNIFICANT CHANGE UP (ref 34.5–45)
HGB BLD-MCNC: 12.9 G/DL — SIGNIFICANT CHANGE UP (ref 11.5–15.5)
MCHC RBC-ENTMCNC: 29.7 PG — SIGNIFICANT CHANGE UP (ref 27–34)
MCHC RBC-ENTMCNC: 32.1 GM/DL — SIGNIFICANT CHANGE UP (ref 32–36)
MCV RBC AUTO: 92.4 FL — SIGNIFICANT CHANGE UP (ref 80–100)
NRBC # BLD: 0 /100 WBCS — SIGNIFICANT CHANGE UP (ref 0–0)
PLATELET # BLD AUTO: 344 K/UL — SIGNIFICANT CHANGE UP (ref 150–400)
POTASSIUM SERPL-MCNC: 4.5 MMOL/L — SIGNIFICANT CHANGE UP (ref 3.5–5.3)
POTASSIUM SERPL-SCNC: 4.5 MMOL/L — SIGNIFICANT CHANGE UP (ref 3.5–5.3)
PROT SERPL-MCNC: 6.9 G/DL — SIGNIFICANT CHANGE UP (ref 6–8.3)
RBC # BLD: 4.35 M/UL — SIGNIFICANT CHANGE UP (ref 3.8–5.2)
RBC # FLD: 14.5 % — SIGNIFICANT CHANGE UP (ref 10.3–14.5)
SARS-COV-2 RNA SPEC QL NAA+PROBE: SIGNIFICANT CHANGE UP
SODIUM SERPL-SCNC: 141 MMOL/L — SIGNIFICANT CHANGE UP (ref 135–145)
WBC # BLD: 4.96 K/UL — SIGNIFICANT CHANGE UP (ref 3.8–10.5)
WBC # FLD AUTO: 4.96 K/UL — SIGNIFICANT CHANGE UP (ref 3.8–10.5)

## 2022-08-30 PROCEDURE — 99223 1ST HOSP IP/OBS HIGH 75: CPT

## 2022-08-30 PROCEDURE — 93005 ELECTROCARDIOGRAM TRACING: CPT

## 2022-08-30 PROCEDURE — 36415 COLL VENOUS BLD VENIPUNCTURE: CPT

## 2022-08-30 PROCEDURE — 85610 PROTHROMBIN TIME: CPT

## 2022-08-30 PROCEDURE — 97116 GAIT TRAINING THERAPY: CPT

## 2022-08-30 PROCEDURE — 72192 CT PELVIS W/O DYE: CPT | Mod: MA

## 2022-08-30 PROCEDURE — 86803 HEPATITIS C AB TEST: CPT

## 2022-08-30 PROCEDURE — 71045 X-RAY EXAM CHEST 1 VIEW: CPT

## 2022-08-30 PROCEDURE — 80053 COMPREHEN METABOLIC PANEL: CPT

## 2022-08-30 PROCEDURE — 96374 THER/PROPH/DIAG INJ IV PUSH: CPT

## 2022-08-30 PROCEDURE — 76377 3D RENDER W/INTRP POSTPROCES: CPT

## 2022-08-30 PROCEDURE — 99285 EMERGENCY DEPT VISIT HI MDM: CPT

## 2022-08-30 PROCEDURE — 97161 PT EVAL LOW COMPLEX 20 MIN: CPT

## 2022-08-30 PROCEDURE — 73700 CT LOWER EXTREMITY W/O DYE: CPT | Mod: MA

## 2022-08-30 PROCEDURE — 81001 URINALYSIS AUTO W/SCOPE: CPT

## 2022-08-30 PROCEDURE — 90715 TDAP VACCINE 7 YRS/> IM: CPT

## 2022-08-30 PROCEDURE — 73502 X-RAY EXAM HIP UNI 2-3 VIEWS: CPT

## 2022-08-30 PROCEDURE — 85730 THROMBOPLASTIN TIME PARTIAL: CPT

## 2022-08-30 PROCEDURE — 85025 COMPLETE CBC W/AUTO DIFF WBC: CPT

## 2022-08-30 PROCEDURE — 73030 X-RAY EXAM OF SHOULDER: CPT

## 2022-08-30 PROCEDURE — U0005: CPT

## 2022-08-30 PROCEDURE — 85027 COMPLETE CBC AUTOMATED: CPT

## 2022-08-30 PROCEDURE — 73562 X-RAY EXAM OF KNEE 3: CPT

## 2022-08-30 PROCEDURE — U0003: CPT

## 2022-08-30 PROCEDURE — 97530 THERAPEUTIC ACTIVITIES: CPT

## 2022-08-30 PROCEDURE — 99233 SBSQ HOSP IP/OBS HIGH 50: CPT

## 2022-08-30 PROCEDURE — 73552 X-RAY EXAM OF FEMUR 2/>: CPT

## 2022-08-30 RX ORDER — LOSARTAN/HYDROCHLOROTHIAZIDE 100MG-25MG
1 TABLET ORAL
Qty: 0 | Refills: 0 | DISCHARGE

## 2022-08-30 RX ORDER — IBUPROFEN 200 MG
1 TABLET ORAL
Qty: 0 | Refills: 0 | DISCHARGE
Start: 2022-08-30

## 2022-08-30 RX ORDER — TOPIRAMATE 25 MG
1 TABLET ORAL
Qty: 0 | Refills: 0 | DISCHARGE
Start: 2022-08-30

## 2022-08-30 RX ORDER — ONDANSETRON 8 MG/1
4 TABLET, FILM COATED ORAL EVERY 4 HOURS
Refills: 0 | Status: DISCONTINUED | OUTPATIENT
Start: 2022-08-30 | End: 2022-09-01

## 2022-08-30 RX ORDER — LANOLIN ALCOHOL/MO/W.PET/CERES
1 CREAM (GRAM) TOPICAL
Qty: 0 | Refills: 0 | DISCHARGE
Start: 2022-08-30

## 2022-08-30 RX ORDER — LANOLIN ALCOHOL/MO/W.PET/CERES
3 CREAM (GRAM) TOPICAL AT BEDTIME
Refills: 0 | Status: DISCONTINUED | OUTPATIENT
Start: 2022-08-30 | End: 2022-09-01

## 2022-08-30 RX ORDER — SENNA PLUS 8.6 MG/1
2 TABLET ORAL
Qty: 0 | Refills: 0 | DISCHARGE
Start: 2022-08-30

## 2022-08-30 RX ORDER — OXYCODONE HYDROCHLORIDE 5 MG/1
2.5 TABLET ORAL EVERY 4 HOURS
Refills: 0 | Status: DISCONTINUED | OUTPATIENT
Start: 2022-08-30 | End: 2022-09-01

## 2022-08-30 RX ORDER — OXYCODONE HYDROCHLORIDE 5 MG/1
5 TABLET ORAL EVERY 4 HOURS
Refills: 0 | Status: DISCONTINUED | OUTPATIENT
Start: 2022-08-30 | End: 2022-09-01

## 2022-08-30 RX ORDER — SODIUM CHLORIDE 0.65 %
1 AEROSOL, SPRAY (ML) NASAL EVERY 6 HOURS
Refills: 0 | Status: DISCONTINUED | OUTPATIENT
Start: 2022-08-30 | End: 2022-09-01

## 2022-08-30 RX ORDER — LOSARTAN POTASSIUM 100 MG/1
1 TABLET, FILM COATED ORAL
Qty: 0 | Refills: 0 | DISCHARGE
Start: 2022-08-30

## 2022-08-30 RX ORDER — POVIDONE-IODINE 5 %
1 AEROSOL (ML) TOPICAL ONCE
Refills: 0 | Status: DISCONTINUED | OUTPATIENT
Start: 2022-08-30 | End: 2022-09-01

## 2022-08-30 RX ORDER — OXYCODONE HYDROCHLORIDE 5 MG/1
2.5 TABLET ORAL EVERY 6 HOURS
Refills: 0 | Status: DISCONTINUED | OUTPATIENT
Start: 2022-08-30 | End: 2022-08-30

## 2022-08-30 RX ORDER — LOSARTAN POTASSIUM 100 MG/1
50 TABLET, FILM COATED ORAL DAILY
Refills: 0 | Status: DISCONTINUED | OUTPATIENT
Start: 2022-08-30 | End: 2022-09-01

## 2022-08-30 RX ORDER — OXYCODONE HYDROCHLORIDE 5 MG/1
5 TABLET ORAL EVERY 6 HOURS
Refills: 0 | Status: DISCONTINUED | OUTPATIENT
Start: 2022-08-30 | End: 2022-08-30

## 2022-08-30 RX ORDER — POLYETHYLENE GLYCOL 3350 17 G/17G
17 POWDER, FOR SOLUTION ORAL
Qty: 0 | Refills: 0 | DISCHARGE
Start: 2022-08-30

## 2022-08-30 RX ORDER — ACETAMINOPHEN 500 MG
650 TABLET ORAL EVERY 6 HOURS
Refills: 0 | Status: DISCONTINUED | OUTPATIENT
Start: 2022-08-30 | End: 2022-09-01

## 2022-08-30 RX ORDER — TRAMADOL HYDROCHLORIDE 50 MG/1
50 TABLET ORAL EVERY 6 HOURS
Refills: 0 | Status: DISCONTINUED | OUTPATIENT
Start: 2022-08-30 | End: 2022-08-30

## 2022-08-30 RX ORDER — CHLORHEXIDINE GLUCONATE 213 G/1000ML
1 SOLUTION TOPICAL
Refills: 0 | Status: DISCONTINUED | OUTPATIENT
Start: 2022-08-30 | End: 2022-09-01

## 2022-08-30 RX ORDER — LIDOCAINE 4 G/100G
1 CREAM TOPICAL
Qty: 0 | Refills: 0 | DISCHARGE
Start: 2022-08-30

## 2022-08-30 RX ORDER — TOPIRAMATE 25 MG
0 TABLET ORAL
Qty: 0 | Refills: 0 | DISCHARGE

## 2022-08-30 RX ORDER — MUPIROCIN 20 MG/G
1 OINTMENT TOPICAL
Refills: 0 | Status: DISCONTINUED | OUTPATIENT
Start: 2022-08-30 | End: 2022-09-01

## 2022-08-30 RX ORDER — LIDOCAINE 4 G/100G
1 CREAM TOPICAL DAILY
Refills: 0 | Status: DISCONTINUED | OUTPATIENT
Start: 2022-08-30 | End: 2022-09-01

## 2022-08-30 RX ORDER — POLYETHYLENE GLYCOL 3350 17 G/17G
17 POWDER, FOR SOLUTION ORAL AT BEDTIME
Refills: 0 | Status: DISCONTINUED | OUTPATIENT
Start: 2022-08-30 | End: 2022-08-31

## 2022-08-30 RX ORDER — TOPIRAMATE 25 MG
50 TABLET ORAL DAILY
Refills: 0 | Status: DISCONTINUED | OUTPATIENT
Start: 2022-08-30 | End: 2022-09-01

## 2022-08-30 RX ORDER — ONDANSETRON 8 MG/1
4 TABLET, FILM COATED ORAL EVERY 8 HOURS
Refills: 0 | Status: DISCONTINUED | OUTPATIENT
Start: 2022-08-30 | End: 2022-08-30

## 2022-08-30 RX ORDER — ENOXAPARIN SODIUM 100 MG/ML
40 INJECTION SUBCUTANEOUS EVERY 24 HOURS
Refills: 0 | Status: DISCONTINUED | OUTPATIENT
Start: 2022-08-30 | End: 2022-08-31

## 2022-08-30 RX ORDER — HYDROCHLOROTHIAZIDE 25 MG
12.5 TABLET ORAL DAILY
Refills: 0 | Status: DISCONTINUED | OUTPATIENT
Start: 2022-08-30 | End: 2022-09-01

## 2022-08-30 RX ADMIN — Medication 975 MILLIGRAM(S): at 17:16

## 2022-08-30 RX ADMIN — ONDANSETRON 4 MILLIGRAM(S): 8 TABLET, FILM COATED ORAL at 22:40

## 2022-08-30 RX ADMIN — OXYCODONE HYDROCHLORIDE 2.5 MILLIGRAM(S): 5 TABLET ORAL at 12:50

## 2022-08-30 RX ADMIN — OXYCODONE HYDROCHLORIDE 2.5 MILLIGRAM(S): 5 TABLET ORAL at 23:42

## 2022-08-30 RX ADMIN — LOSARTAN POTASSIUM 50 MILLIGRAM(S): 100 TABLET, FILM COATED ORAL at 05:45

## 2022-08-30 RX ADMIN — Medication 600 MILLIGRAM(S): at 02:51

## 2022-08-30 RX ADMIN — Medication 1 SPRAY(S): at 23:05

## 2022-08-30 RX ADMIN — Medication 12.5 MILLIGRAM(S): at 05:45

## 2022-08-30 RX ADMIN — POLYETHYLENE GLYCOL 3350 17 GRAM(S): 17 POWDER, FOR SOLUTION ORAL at 11:19

## 2022-08-30 RX ADMIN — Medication 50 MILLIGRAM(S): at 11:21

## 2022-08-30 RX ADMIN — Medication 975 MILLIGRAM(S): at 17:46

## 2022-08-30 RX ADMIN — ENOXAPARIN SODIUM 40 MILLIGRAM(S): 100 INJECTION SUBCUTANEOUS at 05:46

## 2022-08-30 RX ADMIN — OXYCODONE HYDROCHLORIDE 2.5 MILLIGRAM(S): 5 TABLET ORAL at 13:20

## 2022-08-30 RX ADMIN — OXYCODONE HYDROCHLORIDE 2.5 MILLIGRAM(S): 5 TABLET ORAL at 22:42

## 2022-08-30 RX ADMIN — Medication 600 MILLIGRAM(S): at 09:48

## 2022-08-30 RX ADMIN — Medication 975 MILLIGRAM(S): at 05:45

## 2022-08-30 RX ADMIN — LIDOCAINE 1 PATCH: 4 CREAM TOPICAL at 11:19

## 2022-08-30 RX ADMIN — Medication 600 MILLIGRAM(S): at 09:18

## 2022-08-30 RX ADMIN — LACTULOSE 10 GRAM(S): 10 SOLUTION ORAL at 11:20

## 2022-08-30 RX ADMIN — ENOXAPARIN SODIUM 40 MILLIGRAM(S): 100 INJECTION SUBCUTANEOUS at 17:15

## 2022-08-30 NOTE — DISCHARGE NOTE PROVIDER - HOSPITAL COURSE
61 year old female w/pmh significant for HTN, JOSE LUIS (not on CPAP), mild asthma, OA s/p right THR (2017 at [facility] ) then found to have a fractured femoral prosthesis/ revision performed through an extended trochanteric osteotomy 5/2/22 now presents s/p fall found to have a possible extended trochanteric osteotomy non union pending transfer to [facility] for OR for bone graft     Right hip pain.   ·  Plan: - s/p fall found to have a possible extended trochanteric osteotomy non union pending transfer to [facility] for OR for bone graft with Dr. Tejada  - Tramadol / tylenol/ lidocaine patch for pain  - oxycodone 2.5mg/5mg q6prn added pt agreeable to take even though gets mild nausea from it   - PT- johanny post surgery   - WBAT  - D/w ortho appreciate recs.    Preoperative clearance.   ·  Plan: - EKG NSR 64bpm no st/t changes   - METS>4, good functional capacity  - Does not require further cardiac testing prior to OR  - appreciate cards recs.     Fall.   ·  Plan: - history c/w mechanical fall  - fall precautions  - PT- johanny  - management as above.     HTN (hypertension).   ·  Plan: - losartan- hctz  - continue to monitor BP.     Migraine.   ·  Plan: - continue topiramate.     DVT prophylaxis.   ·  Plan: - lovenox   - Transfer to [facility] for OR with ortho  - D/w transfer center and Dr. Cruz at [facility], pt accepted for transfer to a medicine bed at [facility].

## 2022-08-30 NOTE — PROGRESS NOTE ADULT - PROBLEM SELECTOR PLAN 1
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 Patient currently with limited ability to weight bear or ambulate  - D/w ortho 8/29 appreciate recs   - Tramadol / tylenol/ lidocaine patch   - oxycodone 2.5mg q6prn added pt agreeable to take even though gets mild nausea from it   - PT- johanny  - WBAT
- s/p fall found to have a possible extended trochanteric osteotomy non union pending transfer to [facility] for OR for bone graft with Dr. Tejada  - Tramadol / tylenol/ lidocaine patch for pain  - oxycodone 2.5mg/5mg q6prn added pt agreeable to take even though gets mild nausea from it   - PT- johanny post surgery   - WBAT  - D/w ortho appreciate recs
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 Patient currently with limited ability to weight bear or ambulate  - orthopedics consult appreciated  - Tramadol / tylenol/ lidocaine patch   - PT  - WBAT

## 2022-08-30 NOTE — CONSULT NOTE ADULT - SUBJECTIVE AND OBJECTIVE BOX
Pt Name: SUSY CHOUDHURY    MRN: [id number]      Patient is a 61y Female s/p right total hip revision by Dr. Byrnes on 22 presenting to the emergency department with a chief complaint of right hip pain. Pt transferred from [facility] secondary to right hip osteotomy non union, scheduled to have revision surgery with Dr. Chang on 22. Pt otherwise denies fever/chills, c/p, sob, abd pain, n/v/c/d, dysuria, numbness/tingling/weakness and has no other complaints at this time.       REVIEW OF SYSTEMS    General: Alert, responsive, in NAD    Skin/Breast: No rashes, no pruritis   	  Ophthalmologic: No visual changes. No redness.   	  ENMT:	No discharge. No swelling.    Respiratory and Thorax: No difficulty breathing. No cough.  	   Cardiovascular:	No chest pain. No palpitations.    Gastrointestinal:	 No abdominal pain. No diarrhea.     Genitourinary: No dysuria. No bleeding.    Musculoskeletal: SEE HPI.    Neurological: No sensory or motor changes.     Psychiatric: No anxiety or depression.    Hematology/Lymphatics: No swelling.    Endocrine: No Hx of diabetes.    ROS is otherwise negative.    PAST MEDICAL & SURGICAL HISTORY:  PAST MEDICAL & SURGICAL HISTORY:  Failure of right total hip arthroplasty, initial encounter      Hypertension      OA (osteoarthritis)      Broken internal right hip prosthesis, initial encounter      JOSE LUIS (obstructive sleep apnea)      Mild asthma      History of bunionectomy  right foot       S/P excision of lipoma  [id number]      History of total right hip replacement  2017 at [facility]      H/O  section            Allergies: No Known Allergies  opioid-like analgesics (Vomiting; Nausea)      Medications: acetaminophen     Tablet .. 650 milliGRAM(s) Oral every 6 hours PRN  aluminum hydroxide/magnesium hydroxide/simethicone Suspension 30 milliLiter(s) Oral every 4 hours PRN  chlorhexidine 2% Cloths 1 Application(s) Topical <User Schedule>  enoxaparin Injectable 40 milliGRAM(s) SubCutaneous every 24 hours  hydrochlorothiazide 12.5 milliGRAM(s) Oral daily  lidocaine   4% Patch 1 Patch Transdermal daily  losartan 50 milliGRAM(s) Oral daily  melatonin 3 milliGRAM(s) Oral at bedtime PRN  mupirocin 2% Ointment 1 Application(s) Both Nostrils two times a day  ondansetron Injectable 4 milliGRAM(s) IV Push every 4 hours PRN  oxyCODONE    IR 2.5 milliGRAM(s) Oral every 4 hours PRN  oxyCODONE    IR 5 milliGRAM(s) Oral every 4 hours PRN  polyethylene glycol 3350 17 Gram(s) Oral at bedtime PRN  povidone iodine 5% Nasal Swab 1 Application(s) Both Nostrils once  sodium chloride 0.65% Nasal 1 Spray(s) Both Nostrils every 6 hours  topiramate 50 milliGRAM(s) Oral daily      FAMILY HISTORY:  FHx: malignant neoplasm (Mother)  ovarian cancer    FH: epilepsy (Father)    FH: arthritis (Father)    FH: diabetes mellitus (Grandparent)    FH: hypertension (Mother)    : non-contributory    Social History: denies ETOH abuse    Ambulation: Walking independently [ x ] With Cane [ ] With Walker [ ]  Bedbound [ ]                           12.9   4.96  )-----------( 344      ( 30 Aug 2022 06:41 )             40.2       08-30    141  |  106  |  18  ----------------------------<  99  4.5   |  23  |  0.79    Ca    9.6      30 Aug 2022 06:52    TPro  6.9  /  Alb  3.9  /  TBili  0.2  /  DBili  x   /  AST  14  /  ALT  15  /  AlkPhos  130<H>  08-30      Vital Signs Last 24 Hrs  T(C): 36.7 (30 Aug 2022 21:40), Max: 37 (30 Aug 2022 09:16)  T(F): 98 (30 Aug 2022 21:40), Max: 98.6 (30 Aug 2022 09:16)  HR: 78 (30 Aug 2022 21:40) (68 - 85)  BP: 155/99 (30 Aug 2022 21:40) (112/72 - 155/99)  BP(mean): 117 (30 Aug 2022 21:40) (117 - 117)  RR: 18 (30 Aug 2022 21:40) (18 - 18)  SpO2: 98% (30 Aug 2022 21:40) (95% - 98%)    Parameters below as of 30 Aug 2022 21:40  Patient On (Oxygen Delivery Method): room air        Daily     Daily       PHYSICAL EXAM:      Appearance: Alert, responsive, in no acute distress.    Neurological: Sensation is grossly intact to light touch. 5/5 motor function of all extremities. No focal deficits or weaknesses found.    Skin: no rash on visible skin. Skin is clean, dry and intact. No bleeding. No abrasions. No ulcerations.    Vascular: 2+ distal pulses. Cap refill < 2 sec. No signs of venous insufficiency or stasis. No extremity ulcerations. No cyanosis.    Musculoskeletal:         Left Upper Extremity:  + NROM. Non-tender. No signs of trauma.        Right Upper Extremity:  + NROM. Non-tender. No signs of trauma.        Left Lower Extremity:  + NROM. Non-tender. No signs of trauma.        Right Lower Extremity: + TTP of the right hip with decreased ROM secondary to pain, +plantar/dorsiflexion/EHL/FHL intact, comparments soft and comprssible, 2+ DP pulse palpated, SILT, no open wounds or active bleeding noted.      A/P:  Pt is a  61y Female with a right hip osteotomy non union, transferred from [facility] for revision hip surgery by Dr. Chang on 22.    PLAN d/w Dr. Chang:   * NPO for OR thursday  * Admit to medicine  * Pt will need pre op ordered tomorrow

## 2022-08-30 NOTE — H&P ADULT - NSICDXPASTSURGICALHX_GEN_ALL_CORE_FT
PAST SURGICAL HISTORY:  H/O  section     History of bunionectomy right foot     History of total right hip replacement 2017 at [facility]    S/P excision of lipoma [phone]

## 2022-08-30 NOTE — CONSULT NOTE ADULT - PROBLEM SELECTOR RECOMMENDATION 9
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

## 2022-08-30 NOTE — CONSULT NOTE ADULT - SUBJECTIVE AND OBJECTIVE BOX
CHIEF COMPLAINT:    HISTORY OF PRESENT ILLNESS:  61 year old female w/pmh significant for HTN, JOSE LUIS (not on CPAP), mild asthma, OA s/p right THR (2017 at [facility] )and Total revision of hip replacement (Broken internal right hip prosthesis) 02-May-2022, presents after fall complaining of right upper leg pain.   Cardiology consulted for cardiac clearance   Denies chest pain, shortness of breath or palpitations.   NO previous cardiac issues.,     PAST MEDICAL & SURGICAL HISTORY:  Failure of right total hip arthroplasty, initial encounter      Hypertension      OA (osteoarthritis)      Broken internal right hip prosthesis, initial encounter      JOSE LUIS (obstructive sleep apnea)      Mild asthma      History of bunionectomy  right foot       S/P excision of lipoma  [id number]      History of total right hip replacement   at [facility]      H/O  section                MEDICATIONS:  enoxaparin Injectable 40 milliGRAM(s) SubCutaneous every 12 hours  hydrochlorothiazide 12.5 milliGRAM(s) Oral daily  losartan 50 milliGRAM(s) Oral daily        acetaminophen     Tablet .. 975 milliGRAM(s) Oral every 12 hours  ibuprofen  Tablet. 600 milliGRAM(s) Oral every 6 hours PRN  melatonin 3 milliGRAM(s) Oral at bedtime PRN  ondansetron Injectable 4 milliGRAM(s) IV Push every 8 hours PRN  oxyCODONE    IR 2.5 milliGRAM(s) Oral every 6 hours PRN  topiramate 50 milliGRAM(s) Oral daily    aluminum hydroxide/magnesium hydroxide/simethicone Suspension 30 milliLiter(s) Oral every 4 hours PRN  lactulose Syrup 10 Gram(s) Oral once  polyethylene glycol 3350 17 Gram(s) Oral daily  senna 2 Tablet(s) Oral at bedtime      lidocaine   4% Patch 1 Patch Transdermal daily      FAMILY HISTORY:  FHx: malignant neoplasm (Mother)  ovarian cancer    FH: epilepsy (Father)    FH: arthritis (Father)    FH: diabetes mellitus (Grandparent)    FH: hypertension (Mother)        SOCIAL HISTORY:    [ ] Non-smoker  [ ] Smoker  [ ] Alcohol    Allergies    No Known Allergies    Intolerances    opioid-like analgesics (Vomiting; Nausea)  	    REVIEW OF SYSTEMS:  CONSTITUTIONAL: No fever, weight loss, +fatigue  EYES: No eye pain, visual disturbances, or discharge  ENMT:  No difficulty hearing, tinnitus, vertigo; No sinus or throat pain  NECK: No pain or stiffness  RESPIRATORY: No cough, wheezing, chills or hemoptysis; No Shortness of Breath  CARDIOVASCULAR: No chest pain, palpitations, passing out, dizziness, or leg swelling  GASTROINTESTINAL: No abdominal or epigastric pain. No nausea, vomiting, or hematemesis; No diarrhea or constipation. No melena or hematochezia.  GENITOURINARY: No dysuria, frequency, hematuria, or incontinence  NEUROLOGICAL: No headaches, memory loss, loss of strength, numbness, or tremors  SKIN: No itching, burning, rashes, or lesions   LYMPH Nodes: No enlarged glands  ENDOCRINE: No heat or cold intolerance; No hair loss  MUSCULOSKELETAL: No joint pain or swelling; No muscle, back, or extremity pain  PSYCHIATRIC: No depression, anxiety, mood swings, or difficulty sleeping  HEME/LYMPH: No easy bruising, or bleeding gums  ALLERY AND IMMUNOLOGIC: No hives or eczema	    [ ] All others negative	  [ ] Unable to obtain    PHYSICAL EXAM:  T(C): 37 (22 @ 09:16), Max: 37 (22 @ 09:16)  HR: 74 (22 @ 09:16) (68 - 78)  BP: 144/87 (22 @ 09:16) (112/72 - 149/95)  RR: 18 (22 @ 09:16) (18 - 18)  SpO2: 98% (22 @ 09:16) (95% - 98%)  Wt(kg): --  I&O's Summary    30 Aug 2022 07:01  -  30 Aug 2022 10:59  --------------------------------------------------------  IN: 630 mL / OUT: 0 mL / NET: 630 mL        Appearance: Normal	  HEENT:   Normal oral mucosa, PERRL, EOMI	  Lymphatic: No lymphadenopathy  Cardiovascular: Normal S1 S2, No JVD, No murmurs, No edema  Respiratory: Lungs clear to auscultation	  Psychiatry: A & O x 3, Mood & affect appropriate  Gastrointestinal:  Soft, Non-tender, + BS	  Skin: No rashes, No ecchymoses, No cyanosis	  Neurologic: Non-focal  Extremities: Normal range of motion, No clubbing, cyanosis or edema  2+ Peripheral Pulses, right thigh pain ttp   Vascular: Peripheral pulses palpable 2+ bilaterally    TELEMETRY: 	    ECG:  	NSR no acute ischemic stt changes       RADIOLOGY:  x< from: Xray Femur 2 Views, Right (22 @ 14:59) >    ACC: [id number] EXAM:  XR KNEE AP LAT OBL 3 VIEWS RT                        ACC: [id number] EXAM:  XR FEMUR 2 VIEWS RT                        ACC: [id number] EXAM:  XR HIP WITH PELV 2-3V RT                          PROCEDURE DATE:  2022          INTERPRETATION:  CLINICAL INDICATION: Fall, right hip pain, prior right   hip replacement    TECHNIQUE: One view of the pelvis, 2 views of the right hip, 2 views of   the right femur 2 views of the right knee    COMPARISON: X-ray from 2022    IMPRESSION: Status post total right hip replacement. There is a   periprosthetic fracture in the right proximal femur. There is marked   osteoarthrosis of the left hip.    --- End of Report ---          RADHA OCHOA MD; Resident Radiologist  This document has been electronically signed.  YOAAN PICKETT MD; Attending Radiologist  This document has been electronically signed. Aug 28 2022 11:06AM    < end of copied text >  < from: Xray Chest 1 View AP/PA (22 @ 14:59) >    ACC: [id number] EXAM:  XR CHEST AP OR PA 1V                          PROCEDURE DATE:  2022          INTERPRETATION:  EXAMINATION: XR CHEST    CLINICAL INDICATION: fall    TECHNIQUE: Single frontal, portable view of the chest was obtained.    COMPARISON: None.    FINDINGS:    The heart is normal in size.  The lungs are clear.  There is no pneumothorax or pleural effusion.    IMPRESSION:  Clear lungs.    --- End of Report ---          RADHA OCHOA MD; Resident Radiologist  This document has been electronically signed.  YOANA PICKETT MD; Attending Radiologist  This document has been electronically signed. Aug 28 2022 11:07AM    < end of copied text >  < from: CT 3D Reconstruct w/ Workstation (22 @ 17:50) >    ACC: [id number] EXAM:  CT 3D RECONSTRUCT W [facility]                        ACC: [id number] EXAM:  CT PELVIS BONY ONLY                          PROCEDURE DATE:  2022          INTERPRETATION:  PROCEDURE INFORMATION:  Exam: CT Pelvis Without Contrast; Skeletal  Exam date and time: 2022 5:36 PM  Age: 61 years old  Clinical indication: Hip pain; Right hip; Prior surgery; Surgery date: 6+  months    TECHNIQUE:  Imaging protocol: Computed tomography of the pelvis without contrast.   Exam focused on the skeleton.  3D rendering (Not supervised by radiologist): MIP and/or 3D reconstructed   images were created by the technologist.    COMPARISON:  CT ABDOMEN AND PELVIS 2019 11:52 AM    FINDINGS:  Bones/joints: Advanced degenerative spondylosis in the lower lumbar   spine. Advanced osteoarthritis in the left hip, SI joints, and pubic   symphysis. Normal alignment of the right hip arthroplasty prosthesis.   Postoperative changes and changes of prior right femoral ORIF are   unchanged. No new fracture or malalignment.  Soft tissues: Unremarkable. Hardware is intact.    IMPRESSION:  1. Stable changes of prior right hip arthroplasty and chronic right femur   fracture.  2. Advanced degenerative changes in the spine and pelvis.    --- End of Report ---            JD JHAVERI MD; Attending Radiologist  This document has been electronically signed. Aug 28 2022 10:25AM    < end of copied text >    OTHER: 	  	  LABS:	 	    CARDIAC MARKERS:                                  12.9   4.96  )-----------( 344      ( 30 Aug 2022 06:41 )             40.2     08-30    141  |  106  |  18  ----------------------------<  99  4.5   |  23  |  0.79    Ca    9.6      30 Aug 2022 06:52    TPro  6.9  /  Alb  3.9  /  TBili  0.2  /  DBili  x   /  AST  14  /  ALT  15  /  AlkPhos  130<H>  08-30    proBNP:   Lipid Profile:   HgA1c:   TSH:

## 2022-08-30 NOTE — PROGRESS NOTE ADULT - PROBLEM SELECTOR PLAN 6
- lovenox   - Transfer to [facility] for OR with ortho - lovenox   - Transfer to [facility] for OR with ortho  - D/w transfer center and Dr. Cruz at [facility], pt accepted for transfer to a medicine bed at [facility]

## 2022-08-30 NOTE — CONSULT NOTE ADULT - NS ATTEND AMEND GEN_ALL_CORE FT
Orthopaedic Trauma Surgeon Addendum:    I have personally performed a face-to-face diagnostic evaluation on this patient.  I have reviewed the physician assistant note and agree with the history, exam, and plan of care, except as noted.    Planning for OR during this admission. Orthopedic Surgery is ready to proceed with surgery pending medical optimization and adequate Operating Room availability. Risks of surgical delay discussed with other providers and staff. Please call with any questions or concerns.     Jose Chang MD  Orthopaedic Trauma Surgeon  [facility]

## 2022-08-30 NOTE — PROGRESS NOTE ADULT - ASSESSMENT
61 year old female w/pmh significant for HTN, JOSE LUIS (not on CPAP), mild asthma, OA s/p right THR (2017 at [facility] )and Total revision of hip replacement (Broken internal right hip prosthesis) 02-May-2022, presents after fall complaining of right upper leg pain.   
61 year old female w/pmh significant for HTN, JOSE LUIS (not on CPAP), mild asthma, OA s/p right THR (2017 at [facility] )and Total revision of hip replacement (Broken internal right hip prosthesis) 02-May-2022, presents after fall complaining of right upper leg pain.   
61 year old female w/pmh significant for HTN, JOSE LUIS (not on CPAP), mild asthma, OA s/p right THR (2017 at [facility] ) then found to have a fractured femoral prosthesis/ revision performed through an extended trochanteric osteotomy 5/2/22 now presents s/p fall found to have a possible extended trochanteric osteotomy non union pending transfer to [facility] for OR for bone graft

## 2022-08-30 NOTE — CONSULT NOTE ADULT - ASSESSMENT
61 year old female w/pmh significant for HTN, JOSE LUIS (not on CPAP), mild asthma, OA s/p right THR (2017 at [facility] )and Total revision of hip replacement (Broken internal right hip prosthesis) 02-May-2022, presents after fall complaining of right upper leg pain.   Cardiology consulted for cardiac clearance   Denies chest pain, shortness of breath or palpitations.   NO previous cardiac issues.,

## 2022-08-30 NOTE — PROGRESS NOTE ADULT - PROBLEM SELECTOR PLAN 2
history c/w mechanical fall;   - fall precautions  - PT- johanny
history c/w mechanical fall;   - fall precautions  - PT
- EKG NSR 64bpm no st/t changes   - METS>4, good functional capacity  - Does not require further cardiac testing prior to OR  - appreciate cards recs

## 2022-08-30 NOTE — DISCHARGE NOTE PROVIDER - NSDCCPCAREPLAN_GEN_ALL_CORE_FT
PRINCIPAL DISCHARGE DIAGNOSIS  Diagnosis: Fracture, proximal femur  Assessment and Plan of Treatment: Transferred to [facility] for surgery      SECONDARY DISCHARGE DIAGNOSES  Diagnosis: Difficulty walking  Assessment and Plan of Treatment:

## 2022-08-30 NOTE — PATIENT PROFILE ADULT - BRAND OF SECOND COVID-19 BOOSTER
What Type Of Note Output Would You Prefer (Optional)?: Standard Output Hpi Title: Evaluation of Skin Lesions How Severe Are Your Spot(S)?: mild Have Your Spot(S) Been Treated In The Past?: has not been treated Pfizer

## 2022-08-30 NOTE — H&P ADULT - HISTORY OF PRESENT ILLNESS
60 y/o female who was accepted for transfer from [facility] by Dr. Byrnes for evaluation of previous right THR on 5/2/22. Patient was admitted to [facility] on 8/27 s/p fall and found to have fractured femoral prosthesis/non union and may need bone graft. She denies any LOC or head trauma from fall, describes fall as leg giving out. She was seen by Cardiology prior to transfer and deemed stable for surgery. Denies any tori-operative issues in 5/22 during last revision. Other medical history sig for Asthma, obesity, JOSE LUIS not on NIV, Migraines, HTN. Labs/Vitals stable, denies any recent illnesses or changes in medications. Denies SOB, CP, N/V, Diaphoresis, focal weakness.

## 2022-08-30 NOTE — PROGRESS NOTE ADULT - SUBJECTIVE AND OBJECTIVE BOX
Patient is a 61y old  Female who presents with a chief complaint of right hip pain post fall (27 Aug 2022 21:32)      SUBJECTIVE / OVERNIGHT EVENTS: still having right thigh/hip pain, cannot take oxy as it makes her nauseous, no cp, sob    MEDICATIONS  (STANDING):  acetaminophen     Tablet .. 975 milliGRAM(s) Oral every 12 hours  enoxaparin Injectable 40 milliGRAM(s) SubCutaneous every 12 hours  hydrochlorothiazide 12.5 milliGRAM(s) Oral daily  lidocaine   4% Patch 1 Patch Transdermal daily  topiramate 50 milliGRAM(s) Oral daily    MEDICATIONS  (PRN):  aluminum hydroxide/magnesium hydroxide/simethicone Suspension 30 milliLiter(s) Oral every 4 hours PRN Dyspepsia  ibuprofen  Tablet. 600 milliGRAM(s) Oral every 6 hours PRN Moderate Pain (4 - 6)  melatonin 3 milliGRAM(s) Oral at bedtime PRN Insomnia  ondansetron Injectable 4 milliGRAM(s) IV Push every 8 hours PRN Nausea and/or Vomiting  traMADol 50 milliGRAM(s) Oral every 6 hours PRN Severe Pain (7 - 10)        CAPILLARY BLOOD GLUCOSE        I&O's Summary    28 Aug 2022 07:01  -  28 Aug 2022 13:32  --------------------------------------------------------  IN: 240 mL / OUT: 0 mL / NET: 240 mL        PHYSICAL EXAM:  GENERAL: NAD, well-developed  HEAD:  Atraumatic, Normocephalic  EYES: EOMI, PERRLA, conjunctiva and sclera clear  NECK: Supple, No JVD  CHEST/LUNG: Clear to auscultation bilaterally; No wheeze  HEART: Regular rate and rhythm; No murmurs, rubs, or gallops  ABDOMEN: Soft, Nontender, Nondistended; Bowel sounds present  EXTREMITIES:  2+ Peripheral Pulses, No clubbing, cyanosis, or edema  PSYCH: AAOx3  NEUROLOGY: non-focal  SKIN: No rashes or lesions    LABS:                        12.0   5.38  )-----------( 306      ( 28 Aug 2022 07:19 )             36.7         139  |  105  |  19  ----------------------------<  120<H>  4.0   |  25  |  0.59    Ca    9.0      28 Aug 2022 07:19    TPro  6.6  /  Alb  3.7  /  TBili  0.3  /  DBili  x   /  AST  13  /  ALT  17  /  AlkPhos  134<H>  08-28    PT/INR - ( 27 Aug 2022 15:18 )   PT: 11.1 sec;   INR: 0.96 ratio         PTT - ( 27 Aug 2022 15:18 )  PTT:27.4 sec      Urinalysis Basic - ( 27 Aug 2022 22:57 )    Color: Light Yellow / Appearance: Clear / S.028 / pH: x  Gluc: x / Ketone: Moderate  / Bili: Negative / Urobili: Negative   Blood: x / Protein: Negative / Nitrite: Negative   Leuk Esterase: Moderate / RBC: 1 /hpf / WBC 17 /HPF   Sq Epi: x / Non Sq Epi: 6 /hpf / Bacteria: Negative        RADIOLOGY & ADDITIONAL TESTS:    Imaging Personally Reviewed:    Consultant(s) Notes Reviewed:      Care Discussed with Consultants/Other Providers:  
Patient is a 61y old  Female who presents with a chief complaint of right hip pain post fall (30 Aug 2022 10:58)      SUBJECTIVE / OVERNIGHT EVENTS: pain over right hip/thigh is somewhat better, no cp, sob, abd pain    MEDICATIONS  (STANDING):  acetaminophen     Tablet .. 975 milliGRAM(s) Oral every 12 hours  enoxaparin Injectable 40 milliGRAM(s) SubCutaneous every 12 hours  hydrochlorothiazide 12.5 milliGRAM(s) Oral daily  lidocaine   4% Patch 1 Patch Transdermal daily  losartan 50 milliGRAM(s) Oral daily  polyethylene glycol 3350 17 Gram(s) Oral daily  senna 2 Tablet(s) Oral at bedtime  topiramate 50 milliGRAM(s) Oral daily    MEDICATIONS  (PRN):  aluminum hydroxide/magnesium hydroxide/simethicone Suspension 30 milliLiter(s) Oral every 4 hours PRN Dyspepsia  ibuprofen  Tablet. 600 milliGRAM(s) Oral every 6 hours PRN Moderate Pain (4 - 6)  melatonin 3 milliGRAM(s) Oral at bedtime PRN Insomnia  ondansetron Injectable 4 milliGRAM(s) IV Push every 8 hours PRN Nausea and/or Vomiting  oxyCODONE    IR 2.5 milliGRAM(s) Oral every 6 hours PRN Severe Pain (7 - 10)        CAPILLARY BLOOD GLUCOSE        I&O's Summary    30 Aug 2022 07:01  -  30 Aug 2022 11:56  --------------------------------------------------------  IN: 630 mL / OUT: 0 mL / NET: 630 mL        PHYSICAL EXAM:  GENERAL: NAD, well-developed  HEAD:  Atraumatic, Normocephalic  EYES:  conjunctiva and sclera clear  NECK: Supple, No JVD  CHEST/LUNG: Clear to auscultation bilaterally; No wheeze  HEART: Regular rate and rhythm; S1S2  ABDOMEN: Soft, Nontender, Nondistended; Bowel sounds present  EXTREMITIES:  2+ Peripheral Pulses, No clubbing, cyanosis, or edema  PSYCH: AAOx3      LABS:                        12.9   4.96  )-----------( 344      ( 30 Aug 2022 06:41 )             40.2     08-30    141  |  106  |  18  ----------------------------<  99  4.5   |  23  |  0.79    Ca    9.6      30 Aug 2022 06:52    TPro  6.9  /  Alb  3.9  /  TBili  0.2  /  DBili  x   /  AST  14  /  ALT  15  /  AlkPhos  130<H>  08-30              RADIOLOGY & ADDITIONAL TESTS:    Imaging Personally Reviewed:    Consultant(s) Notes Reviewed:      Care Discussed with Consultants/Other Providers:  
Patient is a 61y old  Female who presents with a chief complaint of right hip pain post fall (28 Aug 2022 13:31)      SUBJECTIVE / OVERNIGHT EVENTS: still having pain, agreeable to take oxycodone even though intolerance- it makes her nauseous , no bm , no cp, sob    MEDICATIONS  (STANDING):  acetaminophen     Tablet .. 975 milliGRAM(s) Oral every 12 hours  enoxaparin Injectable 40 milliGRAM(s) SubCutaneous every 12 hours  hydrochlorothiazide 12.5 milliGRAM(s) Oral daily  lactulose Syrup 10 Gram(s) Oral once  lidocaine   4% Patch 1 Patch Transdermal daily  losartan 50 milliGRAM(s) Oral daily  polyethylene glycol 3350 17 Gram(s) Oral daily  senna 2 Tablet(s) Oral at bedtime  topiramate 50 milliGRAM(s) Oral daily    MEDICATIONS  (PRN):  aluminum hydroxide/magnesium hydroxide/simethicone Suspension 30 milliLiter(s) Oral every 4 hours PRN Dyspepsia  ibuprofen  Tablet. 600 milliGRAM(s) Oral every 6 hours PRN Moderate Pain (4 - 6)  melatonin 3 milliGRAM(s) Oral at bedtime PRN Insomnia  ondansetron Injectable 4 milliGRAM(s) IV Push every 8 hours PRN Nausea and/or Vomiting  oxyCODONE    IR 2.5 milliGRAM(s) Oral every 6 hours PRN Severe Pain (7 - 10)        CAPILLARY BLOOD GLUCOSE        I&O's Summary    28 Aug 2022 07:01  -  29 Aug 2022 07:00  --------------------------------------------------------  IN: 240 mL / OUT: 0 mL / NET: 240 mL        PHYSICAL EXAM:  GENERAL: NAD, well-developed  HEAD:  Atraumatic, Normocephalic  EYES:  conjunctiva and sclera clear  NECK: No JVD  CHEST/LUNG: Clear to auscultation bilaterally; No wheeze  HEART: Regular rate and rhythm; No murmurs, rubs, or gallops  ABDOMEN: Soft, Nontender, Nondistended; Bowel sounds present  EXTREMITIES:  2+ Peripheral Pulses, right thigh pain ttp   PSYCH: AAOx3      LABS:                        12.0   5.38  )-----------( 306      ( 28 Aug 2022 07:19 )             36.7         139  |  105  |  19  ----------------------------<  120<H>  4.0   |  25  |  0.59    Ca    9.0      28 Aug 2022 07:19    TPro  6.6  /  Alb  3.7  /  TBili  0.3  /  DBili  x   /  AST  13  /  ALT  17  /  AlkPhos  134<H>      PT/INR - ( 27 Aug 2022 15:18 )   PT: 11.1 sec;   INR: 0.96 ratio         PTT - ( 27 Aug 2022 15:18 )  PTT:27.4 sec      Urinalysis Basic - ( 27 Aug 2022 22:57 )    Color: Light Yellow / Appearance: Clear / S.028 / pH: x  Gluc: x / Ketone: Moderate  / Bili: Negative / Urobili: Negative   Blood: x / Protein: Negative / Nitrite: Negative   Leuk Esterase: Moderate / RBC: 1 /hpf / WBC 17 /HPF   Sq Epi: x / Non Sq Epi: 6 /hpf / Bacteria: Negative        RADIOLOGY & ADDITIONAL TESTS:    Imaging Personally Reviewed:    Consultant(s) Notes Reviewed:      Care Discussed with Consultants/Other Providers:

## 2022-08-30 NOTE — DISCHARGE NOTE PROVIDER - NSDCMRMEDTOKEN_GEN_ALL_CORE_FT
acetaminophen 325 mg oral tablet: 3 tab(s) orally every 8 hours  aluminum hydroxide-magnesium hydroxide 200 mg-200 mg/5 mL oral suspension: 30 milliliter(s) orally every 4 hours, As needed, Dyspepsia  hydroCHLOROthiazide 12.5 mg oral capsule: 1 cap(s) orally once a day  ibuprofen 600 mg oral tablet: 1 tab(s) orally every 6 hours, As needed, Moderate Pain (4 - 6)  lidocaine 4% topical film: Apply topically to affected area once a day  losartan 50 mg oral tablet: 1 tab(s) orally once a day  melatonin 3 mg oral tablet: 1 tab(s) orally once a day (at bedtime), As needed, Insomnia  polyethylene glycol 3350 oral powder for reconstitution: 17 gram(s) orally once a day  senna leaf extract oral tablet: 2 tab(s) orally once a day (at bedtime)  topiramate 50 mg oral tablet: 1 tab(s) orally once a day

## 2022-08-30 NOTE — H&P ADULT - BP NONINVASIVE DIASTOLIC (MM HG)
Health Maintenance Due   Topic Date Due   • COVID-19 Vaccine (1) Never done   • Shingles Vaccine (1 of 2) Never done   • Colorectal Cancer Screen-  Never done   • Hepatitis C Screening  Never done   • Breast Cancer Screening  Never done   • Osteoporosis Screening  Never done   • Pneumococcal Vaccine 65+ (1 of 1 - PPSV23) Never done   • Influenza Vaccine (1) Never done       Patient is due for topics as listed above but is not proceeding with Immunization(s) COVID-19, Influenza, Pneumococcal and Shingles, Colorectal Cancer Screening: Colonoscopy, Hepatitis C Screening, Mammogram and Osteoporosis screening at this time.    99

## 2022-08-30 NOTE — H&P ADULT - ASSESSMENT
62 y/o female with non-union of previously revised right USHA, Hx. of HTN, Migraines, Obesity, Asthma    Right Hip:  -Await Ortho input at this time  -Proposed OR on Thursday 9/1  -NPO after MN 9/1, hold LMWH prior to OR  -Pain control, Incentive spirometer  -WB per Ortho  -No previous tori-operative events noted  -Cardiology eval from [facility] done today reviewed  -No further tori-operative testing indicated  -Hold BP meds on day of surgery to avoid hypotension   -RCRI of 0     HTN:  -DASH diet  -o/p regimen resumed    Migraine:  -Cont. Topamax    JOSE LUIS:  -Not on NIV HS  -O/P pulm f/u    Asthma:  -PRN nebs    Discussed with Patient, Staff 62 y/o female with non-union of previously revised right USHA, epistaxis Hx. of HTN, Migraines, Obesity, Asthma    Right Hip:  -Await Ortho input at this time  -Proposed OR on Thursday 9/1  -NPO after MN 9/1, hold LMWH prior to OR  -Pain control, Incentive spirometer  -WB per Ortho  -No previous tori-operative events noted  -Cardiology eval from [facility] done today reviewed  -No further tori-operative testing indicated  -Hold BP meds on day of surgery to avoid hypotension   -RCRI of 0     Epistaxis:  -Resolved  -No prior hx of epistaxis  -Will order saline nasal spray for likely dry nasal passages  -Cont. LMWH with risk benefit, normal plt. and coags on recent labs    HTN:  -DASH diet  -o/p regimen resumed    Migraine:  -Cont. Topamax    JOSE LUIS:  -Not on NIV HS  -O/P pulm f/u    Asthma:  -PRN nebs    Discussed with Patient, Ortho PA, Nurse at bedside

## 2022-08-30 NOTE — PROGRESS NOTE ADULT - PROBLEM SELECTOR PLAN 3
- history c/w mechanical fall  - fall precautions  - PT- johanny  - management as above
- losartan- hctz
- losartan- hctz

## 2022-08-31 ENCOUNTER — TRANSCRIPTION ENCOUNTER (OUTPATIENT)
Age: 62
End: 2022-08-31

## 2022-08-31 LAB
ANION GAP SERPL CALC-SCNC: 13 MMOL/L — SIGNIFICANT CHANGE UP (ref 5–17)
BUN SERPL-MCNC: 21 MG/DL — HIGH (ref 8–20)
CALCIUM SERPL-MCNC: 9.5 MG/DL — SIGNIFICANT CHANGE UP (ref 8.4–10.5)
CHLORIDE SERPL-SCNC: 104 MMOL/L — SIGNIFICANT CHANGE UP (ref 98–107)
CO2 SERPL-SCNC: 23 MMOL/L — SIGNIFICANT CHANGE UP (ref 22–29)
CREAT SERPL-MCNC: 0.76 MG/DL — SIGNIFICANT CHANGE UP (ref 0.5–1.3)
EGFR: 89 ML/MIN/1.73M2 — SIGNIFICANT CHANGE UP
GLUCOSE SERPL-MCNC: 90 MG/DL — SIGNIFICANT CHANGE UP (ref 70–99)
HCT VFR BLD CALC: 40.2 % — SIGNIFICANT CHANGE UP (ref 34.5–45)
HGB BLD-MCNC: 13.4 G/DL — SIGNIFICANT CHANGE UP (ref 11.5–15.5)
MAGNESIUM SERPL-MCNC: 2.2 MG/DL — SIGNIFICANT CHANGE UP (ref 1.6–2.6)
MCHC RBC-ENTMCNC: 30.1 PG — SIGNIFICANT CHANGE UP (ref 27–34)
MCHC RBC-ENTMCNC: 33.3 GM/DL — SIGNIFICANT CHANGE UP (ref 32–36)
MCV RBC AUTO: 90.3 FL — SIGNIFICANT CHANGE UP (ref 80–100)
PHOSPHATE SERPL-MCNC: 4 MG/DL — SIGNIFICANT CHANGE UP (ref 2.4–4.7)
PLATELET # BLD AUTO: 342 K/UL — SIGNIFICANT CHANGE UP (ref 150–400)
POTASSIUM SERPL-MCNC: 4.1 MMOL/L — SIGNIFICANT CHANGE UP (ref 3.5–5.3)
POTASSIUM SERPL-SCNC: 4.1 MMOL/L — SIGNIFICANT CHANGE UP (ref 3.5–5.3)
RBC # BLD: 4.45 M/UL — SIGNIFICANT CHANGE UP (ref 3.8–5.2)
RBC # FLD: 14.3 % — SIGNIFICANT CHANGE UP (ref 10.3–14.5)
SODIUM SERPL-SCNC: 140 MMOL/L — SIGNIFICANT CHANGE UP (ref 135–145)
WBC # BLD: 5.62 K/UL — SIGNIFICANT CHANGE UP (ref 3.8–10.5)
WBC # FLD AUTO: 5.62 K/UL — SIGNIFICANT CHANGE UP (ref 3.8–10.5)

## 2022-08-31 PROCEDURE — 99232 SBSQ HOSP IP/OBS MODERATE 35: CPT | Mod: 57

## 2022-08-31 PROCEDURE — 99233 SBSQ HOSP IP/OBS HIGH 50: CPT

## 2022-08-31 RX ORDER — POLYETHYLENE GLYCOL 3350 17 G/17G
17 POWDER, FOR SOLUTION ORAL DAILY
Refills: 0 | Status: DISCONTINUED | OUTPATIENT
Start: 2022-08-31 | End: 2022-09-01

## 2022-08-31 RX ORDER — HYDRALAZINE HCL 50 MG
10 TABLET ORAL EVERY 6 HOURS
Refills: 0 | Status: DISCONTINUED | OUTPATIENT
Start: 2022-08-31 | End: 2022-09-01

## 2022-08-31 RX ORDER — SENNA PLUS 8.6 MG/1
2 TABLET ORAL AT BEDTIME
Refills: 0 | Status: DISCONTINUED | OUTPATIENT
Start: 2022-08-31 | End: 2022-09-01

## 2022-08-31 RX ORDER — VANCOMYCIN HCL 1 G
1500 VIAL (EA) INTRAVENOUS ONCE
Refills: 0 | Status: COMPLETED | OUTPATIENT
Start: 2022-09-01 | End: 2022-09-01

## 2022-08-31 RX ORDER — CEFAZOLIN SODIUM 1 G
2000 VIAL (EA) INJECTION ONCE
Refills: 0 | Status: DISCONTINUED | OUTPATIENT
Start: 2022-09-01 | End: 2022-09-01

## 2022-08-31 RX ADMIN — Medication 1 SPRAY(S): at 04:55

## 2022-08-31 RX ADMIN — POLYETHYLENE GLYCOL 3350 17 GRAM(S): 17 POWDER, FOR SOLUTION ORAL at 14:31

## 2022-08-31 RX ADMIN — LOSARTAN POTASSIUM 50 MILLIGRAM(S): 100 TABLET, FILM COATED ORAL at 04:49

## 2022-08-31 RX ADMIN — Medication 1 SPRAY(S): at 13:26

## 2022-08-31 RX ADMIN — OXYCODONE HYDROCHLORIDE 5 MILLIGRAM(S): 5 TABLET ORAL at 14:00

## 2022-08-31 RX ADMIN — OXYCODONE HYDROCHLORIDE 5 MILLIGRAM(S): 5 TABLET ORAL at 05:50

## 2022-08-31 RX ADMIN — LIDOCAINE 1 PATCH: 4 CREAM TOPICAL at 00:53

## 2022-08-31 RX ADMIN — Medication 1 SPRAY(S): at 17:42

## 2022-08-31 RX ADMIN — OXYCODONE HYDROCHLORIDE 5 MILLIGRAM(S): 5 TABLET ORAL at 21:00

## 2022-08-31 RX ADMIN — MUPIROCIN 1 APPLICATION(S): 20 OINTMENT TOPICAL at 17:43

## 2022-08-31 RX ADMIN — ENOXAPARIN SODIUM 40 MILLIGRAM(S): 100 INJECTION SUBCUTANEOUS at 04:50

## 2022-08-31 RX ADMIN — Medication 12.5 MILLIGRAM(S): at 04:50

## 2022-08-31 RX ADMIN — ONDANSETRON 4 MILLIGRAM(S): 8 TABLET, FILM COATED ORAL at 20:18

## 2022-08-31 RX ADMIN — MUPIROCIN 1 APPLICATION(S): 20 OINTMENT TOPICAL at 04:50

## 2022-08-31 RX ADMIN — OXYCODONE HYDROCHLORIDE 5 MILLIGRAM(S): 5 TABLET ORAL at 13:30

## 2022-08-31 RX ADMIN — ONDANSETRON 4 MILLIGRAM(S): 8 TABLET, FILM COATED ORAL at 04:43

## 2022-08-31 RX ADMIN — ONDANSETRON 4 MILLIGRAM(S): 8 TABLET, FILM COATED ORAL at 13:27

## 2022-08-31 RX ADMIN — OXYCODONE HYDROCHLORIDE 5 MILLIGRAM(S): 5 TABLET ORAL at 04:50

## 2022-08-31 RX ADMIN — OXYCODONE HYDROCHLORIDE 5 MILLIGRAM(S): 5 TABLET ORAL at 20:25

## 2022-08-31 RX ADMIN — Medication 50 MILLIGRAM(S): at 13:26

## 2022-08-31 RX ADMIN — CHLORHEXIDINE GLUCONATE 1 APPLICATION(S): 213 SOLUTION TOPICAL at 04:56

## 2022-08-31 RX ADMIN — SENNA PLUS 2 TABLET(S): 8.6 TABLET ORAL at 22:24

## 2022-08-31 RX ADMIN — LIDOCAINE 1 PATCH: 4 CREAM TOPICAL at 14:31

## 2022-08-31 NOTE — PROGRESS NOTE ADULT - SUBJECTIVE AND OBJECTIVE BOX
Pain of right hip    HPI:  60 y/o female who was accepted for transfer from [facility] by Dr. Byrnes for evaluation of previous right THR on 5/2/22. Patient was admitted to [facility] on 8/27 s/p fall and found to have fractured femoral prosthesis/non union and may need bone graft. She denies any LOC or head trauma from fall, describes fall as leg giving out. She was seen by Cardiology prior to transfer and deemed stable for surgery. Denies any tori-operative issues in 5/22 during last revision. Other medical history sig for Asthma, obesity, JOSE LUIS not on NIV, Migraines, HTN. Labs/Vitals stable, denies any recent illnesses or changes in medications. Denies SOB, CP, N/V, Diaphoresis, focal weakness.   (30 Aug 2022 21:40)    Interval History:  Patient was seen and examined at bedside around 2 pm. Pain is controlled.   Has chronic numbness in right leg. No change in it.   Denies chest pain, palpitations, shortness of breath, headache, dizziness, visual symptoms, nausea, vomiting or abdominal pain.    ROS:  As per interval history otherwise unremarkable.    PHYSICAL EXAM:  Vital Signs  T(C): 36.6 (31 Aug 2022 16:25), Max: 36.7 (30 Aug 2022 21:40)  T(F): 97.8 (31 Aug 2022 16:25), Max: 98 (30 Aug 2022 21:40)  HR: 85 (31 Aug 2022 16:25) (66 - 85)  BP: 116/83 (31 Aug 2022 16:25) (112/71 - 155/99)  BP(mean): 117 (30 Aug 2022 21:40) (117 - 117)  RR: 19 (31 Aug 2022 16:25) (18 - 19)  SpO2: 95% (31 Aug 2022 16:25) (95% - 98%)  Parameters below as of 31 Aug 2022 16:25  Patient On (Oxygen Delivery Method): room air  General: Elderly female lying in bed comfortably. No acute distress  HEENT: EOMI. Clear conjunctivae. Moist mucus membrane  Neck: Supple.   Chest: CTA bilaterally. No wheezing, rales or rhonchi.   Heart: Normal S1 & S2. RRR.   Abdomen: Non distended. Soft. Non-tender. + BS  Ext: No pedal edema. No calf tenderness. Healed scar on right hip. No tenderness. ROM limited.   Neuro: AAO x 3. No focal deficit. No speech disorder  Skin: Warm and Dry  Psychiatry: Normal mood and affect    LABS:                        13.4   5.62  )-----------( 342      ( 31 Aug 2022 04:41 )             40.2     08-31    140  |  104  |  21.0<H>  ----------------------------<  90  4.1   |  23.0  |  0.76    Ca    9.5      31 Aug 2022 04:41  Phos  4.0     08-31  Mg     2.2     08-31    TPro  6.9  /  Alb  3.9  /  TBili  0.2  /  DBili  x   /  AST  14  /  ALT  15  /  AlkPhos  130<H>  08-30    RADIOLOGY & ADDITIONAL STUDIES:  Reviewed     MEDICATIONS  (STANDING):  chlorhexidine 2% Cloths 1 Application(s) Topical <User Schedule>  hydrochlorothiazide 12.5 milliGRAM(s) Oral daily  lidocaine   4% Patch 1 Patch Transdermal daily  losartan 50 milliGRAM(s) Oral daily  mupirocin 2% Ointment 1 Application(s) Both Nostrils two times a day  polyethylene glycol 3350 17 Gram(s) Oral daily  povidone iodine 5% Nasal Swab 1 Application(s) Both Nostrils once  senna 2 Tablet(s) Oral at bedtime  sodium chloride 0.65% Nasal 1 Spray(s) Both Nostrils every 6 hours  topiramate 50 milliGRAM(s) Oral daily    MEDICATIONS  (PRN):  acetaminophen     Tablet .. 650 milliGRAM(s) Oral every 6 hours PRN Temp greater or equal to 38C (100.4F), Mild Pain (1 - 3)  aluminum hydroxide/magnesium hydroxide/simethicone Suspension 30 milliLiter(s) Oral every 4 hours PRN Dyspepsia  hydrALAZINE Injectable 10 milliGRAM(s) IV Push every 6 hours PRN If SBP > 160  melatonin 3 milliGRAM(s) Oral at bedtime PRN Insomnia  ondansetron Injectable 4 milliGRAM(s) IV Push every 4 hours PRN Nausea and/or Vomiting  oxyCODONE    IR 2.5 milliGRAM(s) Oral every 4 hours PRN Mild Pain (1 - 3)  oxyCODONE    IR 5 milliGRAM(s) Oral every 4 hours PRN Moderate Pain (4 - 6)

## 2022-08-31 NOTE — PROGRESS NOTE ADULT - SUBJECTIVE AND OBJECTIVE BOX
Patient seen and examined at bedside. Patient comfortable in bed, states right hip pain improved since taking pain meds. Denies fever/chills, SOB/chest pain, abdominal pain, acute motor/sensory changes. no complaints.    Vital Signs Last 24 Hrs  T(C): 36.4 (31 Aug 2022 04:21), Max: 37 (30 Aug 2022 09:16)  T(F): 97.5 (31 Aug 2022 04:21), Max: 98.6 (30 Aug 2022 09:16)  HR: 66 (31 Aug 2022 04:21) (66 - 85)  BP: 149/89 (31 Aug 2022 04:21) (118/76 - 155/99)  BP(mean): 117 (30 Aug 2022 21:40) (117 - 117)  RR: 18 (31 Aug 2022 04:21) (18 - 18)  SpO2: 97% (31 Aug 2022 04:21) (95% - 98%)    Parameters below as of 31 Aug 2022 04:21  Patient On (Oxygen Delivery Method): room air    RLE: Sensation at baseline. + dorsi/plantarflexion. + HF/KF. DP 2+, ext warm cap refill brisk. calf soft NT B/L.                          13.4   5.62  )-----------( 342      ( 31 Aug 2022 04:41 )             40.2     A/P: 61 y.o F with right GT osteotomy nonunion  plan for OR tomorrow with dr dominick hanks after midnight

## 2022-08-31 NOTE — PROGRESS NOTE ADULT - NS ATTEND AMEND GEN_ALL_CORE FT
Orthopaedic Trauma Surgeon Addendum:    I have personally performed a face-to-face diagnostic evaluation on this patient.  I have reviewed the physician assistant note and agree with the history, exam, and plan of care, except as noted.    Planning for surgery 9/1. [facility] noted to be optimized.    Jose Chang MD  Orthopaedic Trauma Surgeon  [facility] [place]

## 2022-09-01 ENCOUNTER — RESULT REVIEW (OUTPATIENT)
Age: 62
End: 2022-09-01

## 2022-09-01 ENCOUNTER — TRANSCRIPTION ENCOUNTER (OUTPATIENT)
Age: 62
End: 2022-09-01

## 2022-09-01 LAB
BLD GP AB SCN SERPL QL: SIGNIFICANT CHANGE UP
GLUCOSE BLDC GLUCOMTR-MCNC: 107 MG/DL — HIGH (ref 70–99)
GLUCOSE BLDC GLUCOMTR-MCNC: 127 MG/DL — HIGH (ref 70–99)
GLUCOSE BLDC GLUCOMTR-MCNC: 129 MG/DL — HIGH (ref 70–99)
GRAM STN FLD: SIGNIFICANT CHANGE UP
HCT VFR BLD CALC: 35.3 % — SIGNIFICANT CHANGE UP (ref 34.5–45)
HGB BLD-MCNC: 11.7 G/DL — SIGNIFICANT CHANGE UP (ref 11.5–15.5)
INR BLD: 0.95 RATIO — SIGNIFICANT CHANGE UP (ref 0.88–1.16)
MCHC RBC-ENTMCNC: 30.3 PG — SIGNIFICANT CHANGE UP (ref 27–34)
MCHC RBC-ENTMCNC: 33.1 GM/DL — SIGNIFICANT CHANGE UP (ref 32–36)
MCV RBC AUTO: 91.5 FL — SIGNIFICANT CHANGE UP (ref 80–100)
PLATELET # BLD AUTO: 372 K/UL — SIGNIFICANT CHANGE UP (ref 150–400)
PROTHROM AB SERPL-ACNC: 11 SEC — SIGNIFICANT CHANGE UP (ref 10.5–13.4)
RBC # BLD: 3.86 M/UL — SIGNIFICANT CHANGE UP (ref 3.8–5.2)
RBC # FLD: 14.2 % — SIGNIFICANT CHANGE UP (ref 10.3–14.5)
SPECIMEN SOURCE: SIGNIFICANT CHANGE UP
WBC # BLD: 13.6 K/UL — HIGH (ref 3.8–10.5)
WBC # FLD AUTO: 13.6 K/UL — HIGH (ref 3.8–10.5)

## 2022-09-01 PROCEDURE — 27470 REPAIR OF THIGH: CPT | Mod: RT

## 2022-09-01 PROCEDURE — 88300 SURGICAL PATH GROSS: CPT | Mod: 26

## 2022-09-01 PROCEDURE — 73552 X-RAY EXAM OF FEMUR 2/>: CPT | Mod: 26,RT

## 2022-09-01 PROCEDURE — 99254 IP/OBS CNSLTJ NEW/EST MOD 60: CPT | Mod: GC

## 2022-09-01 PROCEDURE — 99233 SBSQ HOSP IP/OBS HIGH 50: CPT

## 2022-09-01 PROCEDURE — 27470 REPAIR OF THIGH: CPT | Mod: AS,RT

## 2022-09-01 PROCEDURE — 73502 X-RAY EXAM HIP UNI 2-3 VIEWS: CPT | Mod: 26,RT

## 2022-09-01 DEVICE — SCREW CORT S-T 4.5X38MM: Type: IMPLANTABLE DEVICE | Status: FUNCTIONAL

## 2022-09-01 DEVICE — IMPLANTABLE DEVICE: Type: IMPLANTABLE DEVICE | Status: FUNCTIONAL

## 2022-09-01 DEVICE — GWIRE DRILL TIP 2.5X200MM: Type: IMPLANTABLE DEVICE | Status: FUNCTIONAL

## 2022-09-01 DEVICE — SCREW CORT S-T 4.5X42MM: Type: IMPLANTABLE DEVICE | Status: FUNCTIONAL

## 2022-09-01 DEVICE — SCR CORT S-T 4.5X30MM: Type: IMPLANTABLE DEVICE | Status: FUNCTIONAL

## 2022-09-01 DEVICE — SCREW LOKG SLFTP VAR ANG 3.5X70MM: Type: IMPLANTABLE DEVICE | Status: FUNCTIONAL

## 2022-09-01 DEVICE — CABLE CRMP 1.7X750MM: Type: IMPLANTABLE DEVICE | Status: FUNCTIONAL

## 2022-09-01 DEVICE — SCREW LOKG SLFTP VAR ANG 3.5X50MM: Type: IMPLANTABLE DEVICE | Status: FUNCTIONAL

## 2022-09-01 DEVICE — GRAFT BONE INFUSE KIT MED: Type: IMPLANTABLE DEVICE | Status: FUNCTIONAL

## 2022-09-01 RX ORDER — OXYCODONE HYDROCHLORIDE 5 MG/1
10 TABLET ORAL
Refills: 0 | Status: DISCONTINUED | OUTPATIENT
Start: 2022-09-01 | End: 2022-09-01

## 2022-09-01 RX ORDER — TOPIRAMATE 25 MG
50 TABLET ORAL DAILY
Refills: 0 | Status: DISCONTINUED | OUTPATIENT
Start: 2022-09-01 | End: 2022-09-07

## 2022-09-01 RX ORDER — CEFAZOLIN SODIUM 1 G
2000 VIAL (EA) INJECTION
Refills: 0 | Status: COMPLETED | OUTPATIENT
Start: 2022-09-01 | End: 2022-09-02

## 2022-09-01 RX ORDER — OXYCODONE HYDROCHLORIDE 5 MG/1
5 TABLET ORAL
Refills: 0 | Status: DISCONTINUED | OUTPATIENT
Start: 2022-09-01 | End: 2022-09-07

## 2022-09-01 RX ORDER — HYDROMORPHONE HYDROCHLORIDE 2 MG/ML
1 INJECTION INTRAMUSCULAR; INTRAVENOUS; SUBCUTANEOUS EVERY 4 HOURS
Refills: 0 | Status: DISCONTINUED | OUTPATIENT
Start: 2022-09-01 | End: 2022-09-02

## 2022-09-01 RX ORDER — MAGNESIUM HYDROXIDE 400 MG/1
30 TABLET, CHEWABLE ORAL DAILY
Refills: 0 | Status: DISCONTINUED | OUTPATIENT
Start: 2022-09-01 | End: 2022-09-07

## 2022-09-01 RX ORDER — ENOXAPARIN SODIUM 100 MG/ML
40 INJECTION SUBCUTANEOUS EVERY 24 HOURS
Refills: 0 | Status: DISCONTINUED | OUTPATIENT
Start: 2022-09-02 | End: 2022-09-07

## 2022-09-01 RX ORDER — ONDANSETRON 8 MG/1
4 TABLET, FILM COATED ORAL ONCE
Refills: 0 | Status: COMPLETED | OUTPATIENT
Start: 2022-09-01 | End: 2022-09-01

## 2022-09-01 RX ORDER — HYDRALAZINE HCL 50 MG
10 TABLET ORAL EVERY 6 HOURS
Refills: 0 | Status: DISCONTINUED | OUTPATIENT
Start: 2022-09-01 | End: 2022-09-07

## 2022-09-01 RX ORDER — LOSARTAN POTASSIUM 100 MG/1
50 TABLET, FILM COATED ORAL DAILY
Refills: 0 | Status: DISCONTINUED | OUTPATIENT
Start: 2022-09-01 | End: 2022-09-02

## 2022-09-01 RX ORDER — HYDROCHLOROTHIAZIDE 25 MG
12.5 TABLET ORAL DAILY
Refills: 0 | Status: DISCONTINUED | OUTPATIENT
Start: 2022-09-01 | End: 2022-09-02

## 2022-09-01 RX ORDER — HYDROMORPHONE HYDROCHLORIDE 2 MG/ML
4 INJECTION INTRAMUSCULAR; INTRAVENOUS; SUBCUTANEOUS
Refills: 0 | Status: DISCONTINUED | OUTPATIENT
Start: 2022-09-01 | End: 2022-09-01

## 2022-09-01 RX ORDER — SENNA PLUS 8.6 MG/1
2 TABLET ORAL AT BEDTIME
Refills: 0 | Status: DISCONTINUED | OUTPATIENT
Start: 2022-09-01 | End: 2022-09-07

## 2022-09-01 RX ORDER — PHENYLEPHRINE HYDROCHLORIDE 10 MG/ML
0.4 INJECTION INTRAVENOUS
Qty: 40 | Refills: 0 | Status: DISCONTINUED | OUTPATIENT
Start: 2022-09-01 | End: 2022-09-01

## 2022-09-01 RX ORDER — SODIUM CHLORIDE 9 MG/ML
1000 INJECTION INTRAMUSCULAR; INTRAVENOUS; SUBCUTANEOUS
Refills: 0 | Status: DISCONTINUED | OUTPATIENT
Start: 2022-09-01 | End: 2022-09-03

## 2022-09-01 RX ORDER — SODIUM CHLORIDE 9 MG/ML
1000 INJECTION, SOLUTION INTRAVENOUS
Refills: 0 | Status: DISCONTINUED | OUTPATIENT
Start: 2022-09-01 | End: 2022-09-01

## 2022-09-01 RX ORDER — MIDODRINE HYDROCHLORIDE 2.5 MG/1
10 TABLET ORAL ONCE
Refills: 0 | Status: DISCONTINUED | OUTPATIENT
Start: 2022-09-01 | End: 2022-09-01

## 2022-09-01 RX ORDER — PHENYLEPHRINE HYDROCHLORIDE 10 MG/ML
0.5 INJECTION INTRAVENOUS
Qty: 40 | Refills: 0 | Status: DISCONTINUED | OUTPATIENT
Start: 2022-09-01 | End: 2022-09-01

## 2022-09-01 RX ORDER — SODIUM CHLORIDE 9 MG/ML
500 INJECTION INTRAMUSCULAR; INTRAVENOUS; SUBCUTANEOUS ONCE
Refills: 0 | Status: COMPLETED | OUTPATIENT
Start: 2022-09-01 | End: 2022-09-01

## 2022-09-01 RX ORDER — HYDROMORPHONE HYDROCHLORIDE 2 MG/ML
0.5 INJECTION INTRAMUSCULAR; INTRAVENOUS; SUBCUTANEOUS ONCE
Refills: 0 | Status: DISCONTINUED | OUTPATIENT
Start: 2022-09-01 | End: 2022-09-02

## 2022-09-01 RX ORDER — FENTANYL CITRATE 50 UG/ML
50 INJECTION INTRAVENOUS
Refills: 0 | Status: DISCONTINUED | OUTPATIENT
Start: 2022-09-01 | End: 2022-09-01

## 2022-09-01 RX ORDER — POLYETHYLENE GLYCOL 3350 17 G/17G
17 POWDER, FOR SOLUTION ORAL AT BEDTIME
Refills: 0 | Status: DISCONTINUED | OUTPATIENT
Start: 2022-09-01 | End: 2022-09-07

## 2022-09-01 RX ORDER — ONDANSETRON 8 MG/1
4 TABLET, FILM COATED ORAL EVERY 6 HOURS
Refills: 0 | Status: DISCONTINUED | OUTPATIENT
Start: 2022-09-01 | End: 2022-09-07

## 2022-09-01 RX ORDER — ACETAMINOPHEN 500 MG
975 TABLET ORAL EVERY 8 HOURS
Refills: 0 | Status: DISCONTINUED | OUTPATIENT
Start: 2022-09-01 | End: 2022-09-07

## 2022-09-01 RX ADMIN — HYDROMORPHONE HYDROCHLORIDE 1 MILLIGRAM(S): 2 INJECTION INTRAMUSCULAR; INTRAVENOUS; SUBCUTANEOUS at 20:39

## 2022-09-01 RX ADMIN — LOSARTAN POTASSIUM 50 MILLIGRAM(S): 100 TABLET, FILM COATED ORAL at 05:23

## 2022-09-01 RX ADMIN — FENTANYL CITRATE 50 MICROGRAM(S): 50 INJECTION INTRAVENOUS at 13:45

## 2022-09-01 RX ADMIN — OXYCODONE HYDROCHLORIDE 5 MILLIGRAM(S): 5 TABLET ORAL at 17:00

## 2022-09-01 RX ADMIN — SODIUM CHLORIDE 500 MILLILITER(S): 9 INJECTION INTRAMUSCULAR; INTRAVENOUS; SUBCUTANEOUS at 15:30

## 2022-09-01 RX ADMIN — FENTANYL CITRATE 50 MICROGRAM(S): 50 INJECTION INTRAVENOUS at 14:33

## 2022-09-01 RX ADMIN — Medication 975 MILLIGRAM(S): at 21:22

## 2022-09-01 RX ADMIN — Medication 975 MILLIGRAM(S): at 13:58

## 2022-09-01 RX ADMIN — Medication 650 MILLIGRAM(S): at 00:16

## 2022-09-01 RX ADMIN — POLYETHYLENE GLYCOL 3350 17 GRAM(S): 17 POWDER, FOR SOLUTION ORAL at 21:24

## 2022-09-01 RX ADMIN — ONDANSETRON 4 MILLIGRAM(S): 8 TABLET, FILM COATED ORAL at 14:02

## 2022-09-01 RX ADMIN — MUPIROCIN 1 APPLICATION(S): 20 OINTMENT TOPICAL at 05:26

## 2022-09-01 RX ADMIN — PHENYLEPHRINE HYDROCHLORIDE 17.3 MICROGRAM(S)/KG/MIN: 10 INJECTION INTRAVENOUS at 13:58

## 2022-09-01 RX ADMIN — HYDROMORPHONE HYDROCHLORIDE 1 MILLIGRAM(S): 2 INJECTION INTRAMUSCULAR; INTRAVENOUS; SUBCUTANEOUS at 21:00

## 2022-09-01 RX ADMIN — Medication 100 MILLIGRAM(S): at 20:39

## 2022-09-01 RX ADMIN — Medication 12.5 MILLIGRAM(S): at 05:24

## 2022-09-01 RX ADMIN — Medication 975 MILLIGRAM(S): at 21:24

## 2022-09-01 RX ADMIN — HYDROMORPHONE HYDROCHLORIDE 4 MILLIGRAM(S): 2 INJECTION INTRAMUSCULAR; INTRAVENOUS; SUBCUTANEOUS at 17:25

## 2022-09-01 RX ADMIN — ONDANSETRON 4 MILLIGRAM(S): 8 TABLET, FILM COATED ORAL at 20:38

## 2022-09-01 RX ADMIN — Medication 1 SPRAY(S): at 05:26

## 2022-09-01 RX ADMIN — HYDROMORPHONE HYDROCHLORIDE 4 MILLIGRAM(S): 2 INJECTION INTRAMUSCULAR; INTRAVENOUS; SUBCUTANEOUS at 18:15

## 2022-09-01 RX ADMIN — LIDOCAINE 1 PATCH: 4 CREAM TOPICAL at 00:53

## 2022-09-01 RX ADMIN — SODIUM CHLORIDE 500 MILLILITER(S): 9 INJECTION INTRAMUSCULAR; INTRAVENOUS; SUBCUTANEOUS at 13:04

## 2022-09-01 RX ADMIN — OXYCODONE HYDROCHLORIDE 5 MILLIGRAM(S): 5 TABLET ORAL at 16:06

## 2022-09-01 RX ADMIN — Medication 300 MILLIGRAM(S): at 07:11

## 2022-09-01 RX ADMIN — FENTANYL CITRATE 50 MICROGRAM(S): 50 INJECTION INTRAVENOUS at 14:45

## 2022-09-01 RX ADMIN — CHLORHEXIDINE GLUCONATE 1 APPLICATION(S): 213 SOLUTION TOPICAL at 05:29

## 2022-09-01 RX ADMIN — SODIUM CHLORIDE 125 MILLILITER(S): 9 INJECTION INTRAMUSCULAR; INTRAVENOUS; SUBCUTANEOUS at 14:29

## 2022-09-01 RX ADMIN — Medication 650 MILLIGRAM(S): at 00:36

## 2022-09-01 RX ADMIN — SODIUM CHLORIDE 125 MILLILITER(S): 9 INJECTION INTRAMUSCULAR; INTRAVENOUS; SUBCUTANEOUS at 21:23

## 2022-09-01 RX ADMIN — FENTANYL CITRATE 50 MICROGRAM(S): 50 INJECTION INTRAVENOUS at 13:29

## 2022-09-01 RX ADMIN — SENNA PLUS 2 TABLET(S): 8.6 TABLET ORAL at 21:24

## 2022-09-01 NOTE — BRIEF OPERATIVE NOTE - OPERATION/FINDINGS
fibrous nonunion proximal femur    implants: Synthes VA proximal femur hook plate, 2xcables, allograft femur strut, vivigen x2, DBX

## 2022-09-01 NOTE — DISCHARGE NOTE PROVIDER - NSDCFUADDINST_GEN_ALL_CORE_FT
Ortho follow up: The patient will be seen in the office between 2-3 weeks for wound check.   Please call office to schedule appointment  **  The silver based dressing is to be removed 7 days from the date of surgery 9/8  ** CONTACT THE OFFICE IF THE FOLLOWING DEVELOP:  - the dressing becomes soiled or saturated  - you develop a fever greater that 101F  - the wound becomes red or you develop blistering around the wound  * Patient may shower after post-op day #3 (9/4).   * The patient will continue home PT consistent with posterior total hip replacement protocol and will continue to practice posterior total hip precautions for a minimum of 6 week. Transition to outpatient PT will occur at the time of the first office visit.   * The patient is FULL weight bearing.  *** The patient will continue LOVENOX for 4 weeks for blood clot prevention.   * The patient will take OXYCODONE AND TYLENOL for pain control and adjust according to prescription and patient needs. Contact the office if pain increases while taking prescribed pain medications or related concerns develop.  * The patient will take Senna S while taking oxycodone to prevent narcotic associated constipation.  Additionally, increase water intake (drink at least 8 glasses of water daily) and try adding fiber to the diet by eating fruits, vegetables and foods that are rich in grains. If constipation is experienced, contact the medical/primary care provider to discuss further treatment options.  * To avoid injury at home:  - continue use of rolling walker until cleared by physical therapist  - have family or friend remove all throw rug or objects in hallways that may present a trip hazard.  - if you experience any dizziness or medical concerns, call your medical doctor or  911.  * The implant may activate metal detection devices.  Ortho follow up: The patient will be seen in the office between 2-3 weeks for wound check.   Please call office to schedule appointment  **  The silver based dressing is to be removed 7 days from the date of surgery 9/8  ** CONTACT THE OFFICE IF THE FOLLOWING DEVELOP:  - the dressing becomes soiled or saturated  - you develop a fever greater that 101F  - the wound becomes red or you develop blistering around the wound  * Patient may shower after post-op day #3 (9/4).   * Patient will practice greater troch precautions.   * The patient is FULL weight bearing.  *** The patient will continue LOVENOX for 4 weeks for blood clot prevention.   * The patient will take OXYCODONE AND TYLENOL for pain control and adjust according to prescription and patient needs. Contact the office if pain increases while taking prescribed pain medications or related concerns develop.  * The patient will take Senna S while taking oxycodone to prevent narcotic associated constipation.  Additionally, increase water intake (drink at least 8 glasses of water daily) and try adding fiber to the diet by eating fruits, vegetables and foods that are rich in grains. If constipation is experienced, contact the medical/primary care provider to discuss further treatment options.  * To avoid injury at home:  - continue use of rolling walker until cleared by physical therapist  - have family or friend remove all throw rug or objects in hallways that may present a trip hazard.  - if you experience any dizziness or medical concerns, call your medical doctor or  911.  * The implant may activate metal detection devices.

## 2022-09-01 NOTE — CONSULT NOTE ADULT - ASSESSMENT
60 y/o female with PMHx of Asthma, obesity, JOSE LUIS not on NIV, Migraines, HTN POD0 s/p repair of femur distal to head and neck  fracture using synthes VA proximal femur hook plate, 2xcables, allograft. patient tolerated procedure well but found hypotensive 75/50 in PACU few hours after surgery. patient received 2L bolus crystalloid with no response so started on phenylephrine 0.025. H/H postop 11/35 from 13/40 preop. on physical exam no hematoma on the incision site. off pressors patient BP is 117/60  MAP 64. in reassessment after 30 min Bp remains stable off pressors.        Plan:       62 y/o female with PMHx of Asthma, obesity, JOSE LUIS not on NIV, Migraines, HTN POD0 s/p repair of femur distal to head and neck  fracture using synthes VA proximal femur hook plate, 2xcables, allograft. patient tolerated procedure well but found hypotensive 75/50 in PACU few hours after surgery. patient received 2L bolus crystalloid with no response so started on phenylephrine 0.025. H/H postop 11/35 from 13/40 preop. on physical exam no hematoma on the incision site. off pressors patient BP is 117/60  MAP 64. in reassessment after 40 min Bp remains stable off pressors.        Plan:       60 y/o female with PMHx of Asthma, obesity, JOSE LUIS not on NIV, Migraines, HTN POD0 s/p repair of femur distal to head and neck  fracture using synthes VA proximal femur hook plate, 2xcables, allograft. patient tolerated procedure well but found hypotensive 75/50 in PACU few hours after surgery. patient received 2L bolus crystalloid with no response so started on phenylephrine 0.025. H/H postop 11/35 from 13/40 preop. on physical exam no hematoma on the incision site. off pressors patient BP is 117/60  MAP 64. in reassessment after 40 min Bp remains stable off pressors.       Plan:   Recommend Telemetry bed   HD monitoring   H/H monitoring   SICU admission is not warranted at this time   Patient seen and examined with Dr Beltrán

## 2022-09-01 NOTE — DISCHARGE NOTE PROVIDER - NSDCMRMEDTOKEN_GEN_ALL_CORE_FT
acetaminophen 325 mg oral tablet: 3 tab(s) orally every 8 hours  aluminum hydroxide-magnesium hydroxide 200 mg-200 mg/5 mL oral suspension: 30 milliliter(s) orally every 4 hours, As needed, Dyspepsia  hydroCHLOROthiazide 12.5 mg oral capsule: 1 cap(s) orally once a day  ibuprofen 600 mg oral tablet: 1 tab(s) orally every 6 hours, As needed, Moderate Pain (4 - 6)  lidocaine 4% topical film: Apply topically to affected area once a day  losartan 50 mg oral tablet: 1 tab(s) orally once a day  melatonin 3 mg oral tablet: 1 tab(s) orally once a day (at bedtime), As needed, Insomnia  polyethylene glycol 3350 oral powder for reconstitution: 17 gram(s) orally once a day  senna leaf extract oral tablet: 2 tab(s) orally once a day (at bedtime)  topiramate 50 mg oral tablet: 1 tab(s) orally once a day   acetaminophen 325 mg oral tablet: 3 tab(s) orally every 8 hours, As Needed  mild pain  cefepime 2 g intravenous injection: 2 gram(s) intravenous every 8 hours  enoxaparin: 40 milligram(s) subcutaneous once a day  ferrous sulfate 325 mg (65 mg elemental iron) oral tablet: 1 tab(s) orally once a day  losartan 50 mg oral tablet: 1 tab(s) orally once a day  Multiple Vitamins oral tablet: 1 tab(s) orally once a day  oxyCODONE 5 mg oral tablet: 1 tab(s) orally every 3 hours, As needed, Moderate pain  polyethylene glycol 3350 oral powder for reconstitution: 17 gram(s) orally once a day  senna leaf extract oral tablet: 2 tab(s) orally once a day (at bedtime)  topiramate 50 mg oral tablet: 1 tab(s) orally once a day

## 2022-09-01 NOTE — DISCHARGE NOTE PROVIDER - NSDCCPCAREPLAN_GEN_ALL_CORE_FT
PRINCIPAL DISCHARGE DIAGNOSIS  Diagnosis: Periprosthetic hip fracture  Assessment and Plan of Treatment: s/p ORIF      SECONDARY DISCHARGE DIAGNOSES  Diagnosis: Anemia due to acute blood loss  Assessment and Plan of Treatment: postoperative    Diagnosis: HTN (hypertension)  Assessment and Plan of Treatment:

## 2022-09-01 NOTE — DISCHARGE NOTE PROVIDER - ATTENDING DISCHARGE PHYSICAL EXAMINATION:
Vital Signs Last 24 Hrs  T(C): 36.4 (09-06-22 @ 03:46), Max: 36.8 (09-05-22 @ 16:29)  T(F): 97.6 (09-06-22 @ 03:46), Max: 98.2 (09-05-22 @ 16:29)  HR: 86 (09-06-22 @ 03:46) (85 - 86)  BP: 113/72 (09-06-22 @ 03:46) (113/72 - 120/77)  BP(mean): --  RR: 18 (09-06-22 @ 03:46) (18 - 19)  SpO2: 94% (09-06-22 @ 03:46) (93% - 94%)    GENERAL: Comfortable, pain controlled  HEAD:  Atraumatic, Normocephalic  EYES: EOMI, PERRLA, conjunctiva and sclera clear  ENT: Moist mucous membranes, No lesions  NECK: Supple, No JVD, Normal thyroid  NERVOUS SYSTEM:  Alert & Oriented X 3, Moving tereza UE and left LE, right LE ankle full flexion and knee min flexion, Dressings C/D/I  CHEST/LUNG: CTA bilaterally; No rales, rhonchi, wheezing, or rubs  HEART: Regular rate and rhythm; No murmurs, rubs, or gallops  ABDOMEN: Soft, Nontender, Nondistended; Bowel sounds present  EXTREMITIES:  2+ Peripheral Pulses, No clubbing, cyanosis, or edema  SKIN: No rashes or lesions   ICU Vital Signs Last 24 Hrs  T(C): 36.7 (07 Sep 2022 04:41), Max: 36.8 (06 Sep 2022 16:08)  T(F): 98 (07 Sep 2022 04:41), Max: 98.2 (06 Sep 2022 16:08)  HR: 70 (07 Sep 2022 04:41) (67 - 83)  BP: 121/76 (07 Sep 2022 04:41) (113/69 - 122/73)  BP(mean): --  ABP: --  ABP(mean): --  RR: 18 (07 Sep 2022 04:41) (18 - 18)  SpO2: 95% (07 Sep 2022 04:41) (94% - 95%)    O2 Parameters below as of 07 Sep 2022 04:41  Patient On (Oxygen Delivery Method): room air      GENERAL: Comfortable, pain controlled  HEAD:  Atraumatic, Normocephalic  EYES: EOMI, PERRLA, conjunctiva and sclera clear  ENT: Moist mucous membranes, No lesions  NECK: Supple, No JVD, Normal thyroid  NERVOUS SYSTEM:  Alert & Oriented X 3, Moving tereza UE and left LE, right LE ankle full flexion and knee min flexion, Dressings C/D/I  CHEST/LUNG: CTA bilaterally; No rales, rhonchi, wheezing, or rubs  HEART: Regular rate and rhythm; No murmurs, rubs, or gallops  ABDOMEN: Soft, Nontender, Nondistended; Bowel sounds present  EXTREMITIES:  2+ Peripheral Pulses, No clubbing, cyanosis, or edema  SKIN: No rashes or lesions

## 2022-09-01 NOTE — PROGRESS NOTE ADULT - SUBJECTIVE AND OBJECTIVE BOX
History: Patient is status post RIGHT GT orif on 9/1, POD # 0.   Patient is doing well and is comfortable.   The patient's pain is controlled using the prescribed pain medications.   Denies nausea, vomiting, chest pain, shortness of breath, abdominal pain or fever. No new complaints.    Vital Signs Last 24 Hrs  T(C): 36.8 (01 Sep 2022 19:30), Max: 37.4 (01 Sep 2022 12:30)  T(F): 98.3 (01 Sep 2022 19:30), Max: 99.3 (01 Sep 2022 12:30)  HR: 78 (01 Sep 2022 19:30) (66 - 90)  BP: 102/64 (01 Sep 2022 19:30) (82/60 - 134/81)  BP(mean): 64 (01 Sep 2022 18:00) (44 - 86)  RR: 19 (01 Sep 2022 19:30) (12 - 20)  SpO2: 95% (01 Sep 2022 19:30) (95% - 100%)    Parameters below as of 01 Sep 2022 19:30  Patient On (Oxygen Delivery Method): room air      Physical exam:   The right hip dressing is clean, dry and intact. No drainage or discharge.   The calf is supple nontender. Passive range of motion is acceptable to due postoperative pain. No calf tenderness.   Sensation to light touch is grossly intact distally. (baseline neuropathy)    Motor function distally is 5/5. No foot drop.   2+ dorsalis pedis pulse. Capillary refill is less than 2 seconds. No cyanosis.    Primary Orthopedic Assessment:  • s/p RIGHT GT open reduction internal fixation     Plan:   • DVT prophylaxis as prescribed, including use of compression devices and ankle pumps  • Continue physical therapy  • Weightbearing as tolerated of the RIGHT lower extremity with assistance of a walker. GT precautions.   • Incentive spirometry encouraged  • Pain control as clinically indicated  • ANCEF per scip

## 2022-09-01 NOTE — DISCHARGE NOTE PROVIDER - HOSPITAL COURSE
60 y/o female medical history sig for Asthma, obesity, JOSE LUIS not on NIV, Migraines, HTN who was accepted for transfer from [facility] by Dr. Byrnes for evaluation of previous right THR on 5/2/22. Patient was admitted to [facility] on 8/27 s/p fall and found to have fractured femoral prosthesis/ non union and may need bone graft. She denies any LOC or head trauma from fall, describes fall as leg giving out. She was seen by Cardiology prior to transfer and deemed stable for surgery. Denies any tori-operative issues in 5/22 during last revision.     1) Right Hip Osteotomy Nonunion -s/p right hip ORIF periprosthetic fx 9/1/2022  2) ABLA post operative- 1 PRBC 9/3/2022    OR c/s grew rare Pseudomonas. Maxipime initiated. through 10/8/2022 total 8 weeks. followed by suppression therapy.  PICC placed 9/6/2022    Medically stable and agreeable with discharge and follow up plan. Patient was advised to return to ED if any symptoms occur or worsen.  TIME 43 mins

## 2022-09-01 NOTE — DISCHARGE NOTE PROVIDER - CARE PROVIDERS DIRECT ADDRESSES
[email or web address]

## 2022-09-01 NOTE — CONSULT NOTE ADULT - ATTENDING COMMENTS
Off pressors when evaluated without evidence of bleeding on exam or labs, and responding appropriately to fluid resuscitation by primary team.   No indication for SICU admission at this time.     --Primary team to continue fluid resuscitation and BP monitoring as indicated.   --Consider  bed and trend I/Os + labs.     *reconsult prn.

## 2022-09-01 NOTE — DISCHARGE NOTE PROVIDER - PROVIDER TOKENS
PROVIDER:[TOKEN:[[id number]]] PROVIDER:[TOKEN:[[id number]]],PROVIDER:[TOKEN:[[id number]]]

## 2022-09-01 NOTE — BRIEF OPERATIVE NOTE - COMMENTS
post-op plan: WBAT, PT/OT, ancef until cultures return, contralateral SCD, LMWH (or equivalent) x 4 weeks post-op

## 2022-09-01 NOTE — CONSULT NOTE ADULT - SUBJECTIVE AND OBJECTIVE BOX
SICU CONSULT     HPI:  60 y/o female with PMHx of Asthma, obesity, JOSE LUIS not on NIV, Migraines, HTN POD0 s/p repair of femur distal to head and neck  fracture using synthes VA proximal femur hook plate, 2xcables, allograft. patient was transfer from [facility] by Dr. Byrnes for evaluation of previous right THR on 22 s/p fall complicated by fractured femoral prosthesis. patient tolerated procedure well however patient found hypotensive in PACU few hours after surgery. patient received 2L bolus crystalloid with no response so started on phenylephrine 0.025. SICU was consulted       ROS: 10-system review is otherwise negative except HPI above.      PAST MEDICAL & SURGICAL HISTORY:  Failure of right total hip arthroplasty, initial encounter      Hypertension      OA (osteoarthritis)      Broken internal right hip prosthesis, initial encounter      JOSE LUIS (obstructive sleep apnea)      Mild asthma      History of bunionectomy  right foot       S/P excision of lipoma  [id number]      History of total right hip replacement  2017 at [facility]      H/O  section          FAMILY HISTORY:  FHx: malignant neoplasm (Mother)  ovarian cancer    FH: epilepsy (Father)    FH: arthritis (Father)    FH: diabetes mellitus (Grandparent)    FH: hypertension (Mother)      Family history not pertinent as reviewed with the patient.    SOCIAL HISTORY:  Denies any toxic habits    ALLERGIES: NKA No Known Allergies  opioid-like analgesics (Vomiting; Nausea)      HOME MEDICATIONS: ***  Home Medications:  acetaminophen 325 mg oral tablet: 3 tab(s) orally every 8 hours (27 Aug 2022 22:05)  aluminum hydroxide-magnesium hydroxide 200 mg-200 mg/5 mL oral suspension: 30 milliliter(s) orally every 4 hours, As needed, Dyspepsia (30 Aug 2022 19:02)  hydroCHLOROthiazide 12.5 mg oral capsule: 1 cap(s) orally once a day (30 Aug 2022 19:02)  ibuprofen 600 mg oral tablet: 1 tab(s) orally every 6 hours, As needed, Moderate Pain (4 - 6) (30 Aug 2022 19:02)  lidocaine 4% topical film: Apply topically to affected area once a day (30 Aug 2022 19:02)  losartan 50 mg oral tablet: 1 tab(s) orally once a day (30 Aug 2022 19:02)  melatonin 3 mg oral tablet: 1 tab(s) orally once a day (at bedtime), As needed, Insomnia (30 Aug 2022 19:02)  polyethylene glycol 3350 oral powder for reconstitution: 17 gram(s) orally once a day (30 Aug 2022 19:02)  senna leaf extract oral tablet: 2 tab(s) orally once a day (at bedtime) (30 Aug 2022 19:02)  topiramate 50 mg oral tablet: 1 tab(s) orally once a day (30 Aug 2022 19:02)      --------------------------------------------------------------------------------------------    PHYSICAL EXAM:   General: Lying in bed comfortably  Neuro: A+Ox3  HEENT:MMM  Cardio: RRR  Resp: Non labored breathing on RA  GI/Abd: Soft, NT/ND  Vascular: All 4 extremities warm and well perfused.   Pelvis: stable  Musculoskeletal: RLE dressing is C/D/I, no strike through, no hematoma R feet palpable DP, motor and sensory intact   --------------------------------------------------------------------------------------------    LABS                 11.7   13.60  )----------(  372       ( 01 Sep 2022 12:50 )               35.3              PT/INR -  11.0 sec / 0.95 ratio   ( 01 Sep 2022 05:40 )         CAPILLARY BLOOD GLUCOSE              -------------------------------------------------------------------------------------------- SICU CONSULT     HPI:  60 y/o female with PMHx of Asthma, obesity, JOSE LUIS not on NIV, Migraines, HTN POD0 s/p repair of femur distal to head and neck  fracture using synthes VA proximal femur hook plate, 2xcables, allograft. patient was transfer from [facility] by Dr. Byrnes for evaluation of previous right THR on 22 s/p fall complicated by fractured femoral prosthesis. patient tolerated procedure well however patient found hypotensive in PACU few hours after surgery. patient received 2L bolus crystalloid with no response, started on phenylephrine 0.025. SICU was consulted       ROS: 10-system review is otherwise negative except HPI above.      PAST MEDICAL & SURGICAL HISTORY:  Failure of right total hip arthroplasty, initial encounter      Hypertension      OA (osteoarthritis)      Broken internal right hip prosthesis, initial encounter      JOSE LUIS (obstructive sleep apnea)      Mild asthma      History of bunionectomy  right foot       S/P excision of lipoma  [id number]      History of total right hip replacement  2017 at \[facility]\""      H/O  section          FAMILY HISTORY:  FHx: malignant neoplasm (Mother)  ovarian cancer    FH: epilepsy (Father)    FH: arthritis (Father)    FH: diabetes mellitus (Grandparent)    FH: hypertension (Mother)      Family history not pertinent as reviewed with the patient.    SOCIAL HISTORY:  Denies any toxic habits    ALLERGIES: NKA No Known Allergies  opioid-like analgesics (Vomiting; Nausea)      HOME MEDICATIONS: ***  Home Medications:  acetaminophen 325 mg oral tablet: 3 tab(s) orally every 8 hours (27 Aug 2022 22:05)  aluminum hydroxide-magnesium hydroxide 200 mg-200 mg/5 mL oral suspension: 30 milliliter(s) orally every 4 hours, As needed, Dyspepsia (30 Aug 2022 19:02)  hydroCHLOROthiazide 12.5 mg oral capsule: 1 cap(s) orally once a day (30 Aug 2022 19:02)  ibuprofen 600 mg oral tablet: 1 tab(s) orally every 6 hours, As needed, Moderate Pain (4 - 6) (30 Aug 2022 19:02)  lidocaine 4% topical film: Apply topically to affected area once a day (30 Aug 2022 19:02)  losartan 50 mg oral tablet: 1 tab(s) orally once a day (30 Aug 2022 19:02)  melatonin 3 mg oral tablet: 1 tab(s) orally once a day (at bedtime), As needed, Insomnia (30 Aug 2022 19:02)  polyethylene glycol 3350 oral powder for reconstitution: 17 gram(s) orally once a day (30 Aug 2022 19:02)  senna leaf extract oral tablet: 2 tab(s) orally once a day (at bedtime) (30 Aug 2022 19:02)  topiramate 50 mg oral tablet: 1 tab(s) orally once a day (30 Aug 2022 19:02)      --------------------------------------------------------------------------------------------    PHYSICAL EXAM:   General: Lying in bed comfortably  Neuro: A+Ox3  HEENT:MMM  Cardio: RRR  Resp: Non labored breathing on RA  GI/Abd: Soft, NT/ND  Vascular: All 4 extremities warm and well perfused.   Pelvis: stable  Musculoskeletal: RLE dressing is C/D/I, no strike through, no hematoma R feet palpable DP, motor and sensory intact   --------------------------------------------------------------------------------------------    LABS                 11.7   13.60  )----------(  372       ( 01 Sep 2022 12:50 )               35.3              PT/INR -  11.0 sec / 0.95 ratio   ( 01 Sep 2022 05:40 )         CAPILLARY BLOOD GLUCOSE              --------------------------------------------------------------------------------------------

## 2022-09-01 NOTE — DISCHARGE NOTE PROVIDER - NSDCFUADDAPPT_GEN_ALL_CORE_FT
Will need Cefepime 2gm IV q 8hours till 10/28/22 (8 weeks) and she will likely need oral suppression after completion of IV antibiotics.    - Appointment with ID in 7 to 10 days - [phone]

## 2022-09-01 NOTE — PROGRESS NOTE ADULT - SUBJECTIVE AND OBJECTIVE BOX
Pain of right hip    HPI:  62 y/o female who was accepted for transfer from [facility] by Dr. Byrnes for evaluation of previous right THR on 5/2/22. Patient was admitted to [facility] on 8/27 s/p fall and found to have fractured femoral prosthesis/non union and may need bone graft. She denies any LOC or head trauma from fall, describes fall as leg giving out. She was seen by Cardiology prior to transfer and deemed stable for surgery. Denies any tori-operative issues in 5/22 during last revision. Other medical history sig for Asthma, obesity, JOSE LUIS not on NIV, Migraines, HTN. Labs/Vitals stable, denies any recent illnesses or changes in medications. Denies SOB, CP, N/V, Diaphoresis, focal weakness.   (30 Aug 2022 21:40)    Interval History:  Patient was seen and examined in PACU.   Post op BP was low so she was given IVF bolus and was started on Phenylephedrine, it was just weaned off. BP is soft but stable.    Complaining of pain in right hip.   Denies chest pain, palpitations, shortness of breath, headache, dizziness, visual symptoms, nausea, vomiting or abdominal pain.    ROS:  As per interval history otherwise unremarkable.    PHYSICAL EXAM:  Vital Signs Last 24 Hrs  T(C): 37.4 (01 Sep 2022 12:30), Max: 37.4 (01 Sep 2022 12:30)  T(F): 99.3 (01 Sep 2022 12:30), Max: 99.3 (01 Sep 2022 12:30)  HR: 81 (01 Sep 2022 16:00) (66 - 90)  BP: 95/59 (01 Sep 2022 16:00) (82/60 - 134/81)  BP(mean): 68 (01 Sep 2022 16:00) (44 - 86)  RR: 16 (01 Sep 2022 16:00) (12 - 20)  SpO2: 97% (01 Sep 2022 16:00) (95% - 100%)  Parameters below as of 01 Sep 2022 16:00  Patient On (Oxygen Delivery Method): room air  General: Elderly female lying in bed comfortably. No acute distress  HEENT: EOMI. Clear conjunctivae. Moist mucus membrane  Neck: Supple.   Chest: CTA bilaterally. No wheezing, rales or rhonchi.   Heart: Normal S1 & S2. RRR.   Abdomen: Non distended. Soft. Non-tender. + BS  : Fuchs's catheter in place.   Ext: No pedal edema. No calf tenderness. Dressing on right hip. Neurovascular intact.   Neuro: Active and alert. No focal deficit. No speech disorder  Skin: Warm and Dry  Psychiatry: Normal mood and affect    LABS:                        11.7   13.60 )-----------( 372      ( 01 Sep 2022 12:50 )             35.3     08-31    140  |  104  |  21.0<H>  ----------------------------<  90  4.1   |  23.0  |  0.76    Ca    9.5      31 Aug 2022 04:41  Phos  4.0     08-31  Mg     2.2     08-31    PT/INR - ( 01 Sep 2022 05:40 )   PT: 11.0 sec;   INR: 0.95 ratio      RADIOLOGY & ADDITIONAL STUDIES:  Reviewed     MEDICATIONS  (STANDING):  acetaminophen     Tablet .. 975 milliGRAM(s) Oral every 8 hours  ceFAZolin   IVPB 2000 milliGRAM(s) IV Intermittent <User Schedule>  HYDROmorphone  Injectable 0.5 milliGRAM(s) IV Push once  lactated ringers. 1000 milliLiter(s) (75 mL/Hr) IV Continuous <Continuous>  multivitamin 1 Tablet(s) Oral daily  phenylephrine    Infusion 0.5 MICROgram(s)/kG/Min (17.3 mL/Hr) IV Continuous <Continuous>  polyethylene glycol 3350 17 Gram(s) Oral at bedtime  senna 2 Tablet(s) Oral at bedtime  sodium chloride 0.9%. 1000 milliLiter(s) (125 mL/Hr) IV Continuous <Continuous>    MEDICATIONS  (PRN):  fentaNYL    Injectable 50 MICROGram(s) IV Push every 5 minutes PRN Severe Pain (7 - 10)  HYDROmorphone   Tablet 4 milliGRAM(s) Oral every 3 hours PRN Severe Pain (7 - 10)  magnesium hydroxide Suspension 30 milliLiter(s) Oral daily PRN Constipation  ondansetron Injectable 4 milliGRAM(s) IV Push every 6 hours PRN Nausea and/or Vomiting  oxyCODONE    IR 5 milliGRAM(s) Oral every 3 hours PRN Mild Pain (1 - 3)  oxyCODONE    IR 10 milliGRAM(s) Oral every 3 hours PRN Moderate Pain (4 - 6)

## 2022-09-01 NOTE — DISCHARGE NOTE PROVIDER - CARE PROVIDER_API CALL
Jose Chang)  Orthopaedic Surgery  [address] [place] [address]  Phone: [phone]  Fax: [phone]  Follow Up Time:    Jose Chang)  Orthopaedic Surgery  [address] [place] [address]  Phone: [phone]  Fax: [phone]  Follow Up Time:     Tanja Oliva)  Infectious Disease; Internal Medicine  [address] [place] [address]  Phone: [phone]  Fax: [phone]  Follow Up Time:

## 2022-09-01 NOTE — BRIEF OPERATIVE NOTE - NSICDXBRIEFPREOP_GEN_ALL_CORE_FT
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

## 2022-09-01 NOTE — CHART NOTE - NSCHARTNOTEFT_GEN_A_CORE
Orthopaedic Trauma Surgeon Addendum:    I have reviewed the physician assistant note and agree with the history, exam, and plan of care, except as noted.    Plan for OR 9/1/22    Jose Chang MD  Orthopaedic Trauma Surgeon  [facility]

## 2022-09-01 NOTE — BRIEF OPERATIVE NOTE - NSICDXBRIEFPROCEDURE_GEN_ALL_CORE_FT
PROCEDURES:  Repair, nonunion or malunion, femur distal to head and neck 01-Sep-2022 11:27:32  Jose Chang

## 2022-09-02 LAB
ANION GAP SERPL CALC-SCNC: 10 MMOL/L — SIGNIFICANT CHANGE UP (ref 5–17)
BUN SERPL-MCNC: 16.7 MG/DL — SIGNIFICANT CHANGE UP (ref 8–20)
CALCIUM SERPL-MCNC: 8.2 MG/DL — LOW (ref 8.4–10.5)
CHLORIDE SERPL-SCNC: 104 MMOL/L — SIGNIFICANT CHANGE UP (ref 98–107)
CO2 SERPL-SCNC: 21 MMOL/L — LOW (ref 22–29)
CREAT SERPL-MCNC: 0.61 MG/DL — SIGNIFICANT CHANGE UP (ref 0.5–1.3)
EGFR: 101 ML/MIN/1.73M2 — SIGNIFICANT CHANGE UP
GLUCOSE SERPL-MCNC: 124 MG/DL — HIGH (ref 70–99)
HCT VFR BLD CALC: 25.9 % — LOW (ref 34.5–45)
HCT VFR BLD CALC: 28.4 % — LOW (ref 34.5–45)
HGB BLD-MCNC: 8.6 G/DL — LOW (ref 11.5–15.5)
HGB BLD-MCNC: 9 G/DL — LOW (ref 11.5–15.5)
MCHC RBC-ENTMCNC: 29.2 PG — SIGNIFICANT CHANGE UP (ref 27–34)
MCHC RBC-ENTMCNC: 29.9 PG — SIGNIFICANT CHANGE UP (ref 27–34)
MCHC RBC-ENTMCNC: 31.7 GM/DL — LOW (ref 32–36)
MCHC RBC-ENTMCNC: 33.2 GM/DL — SIGNIFICANT CHANGE UP (ref 32–36)
MCV RBC AUTO: 89.9 FL — SIGNIFICANT CHANGE UP (ref 80–100)
MCV RBC AUTO: 92.2 FL — SIGNIFICANT CHANGE UP (ref 80–100)
PLATELET # BLD AUTO: 271 K/UL — SIGNIFICANT CHANGE UP (ref 150–400)
PLATELET # BLD AUTO: 284 K/UL — SIGNIFICANT CHANGE UP (ref 150–400)
POTASSIUM SERPL-MCNC: 3.8 MMOL/L — SIGNIFICANT CHANGE UP (ref 3.5–5.3)
POTASSIUM SERPL-SCNC: 3.8 MMOL/L — SIGNIFICANT CHANGE UP (ref 3.5–5.3)
RBC # BLD: 2.88 M/UL — LOW (ref 3.8–5.2)
RBC # BLD: 3.08 M/UL — LOW (ref 3.8–5.2)
RBC # FLD: 14.4 % — SIGNIFICANT CHANGE UP (ref 10.3–14.5)
RBC # FLD: 14.5 % — SIGNIFICANT CHANGE UP (ref 10.3–14.5)
SODIUM SERPL-SCNC: 135 MMOL/L — SIGNIFICANT CHANGE UP (ref 135–145)
SURGICAL PATHOLOGY STUDY: SIGNIFICANT CHANGE UP
WBC # BLD: 6.51 K/UL — SIGNIFICANT CHANGE UP (ref 3.8–10.5)
WBC # BLD: 6.52 K/UL — SIGNIFICANT CHANGE UP (ref 3.8–10.5)
WBC # FLD AUTO: 6.51 K/UL — SIGNIFICANT CHANGE UP (ref 3.8–10.5)
WBC # FLD AUTO: 6.52 K/UL — SIGNIFICANT CHANGE UP (ref 3.8–10.5)

## 2022-09-02 PROCEDURE — 99233 SBSQ HOSP IP/OBS HIGH 50: CPT

## 2022-09-02 RX ORDER — LACTULOSE 10 G/15ML
10 SOLUTION ORAL ONCE
Refills: 0 | Status: COMPLETED | OUTPATIENT
Start: 2022-09-02 | End: 2022-09-02

## 2022-09-02 RX ORDER — OXYCODONE HYDROCHLORIDE 5 MG/1
10 TABLET ORAL
Refills: 0 | Status: DISCONTINUED | OUTPATIENT
Start: 2022-09-02 | End: 2022-09-07

## 2022-09-02 RX ORDER — CALCIUM GLUCONATE 100 MG/ML
2 VIAL (ML) INTRAVENOUS ONCE
Refills: 0 | Status: COMPLETED | OUTPATIENT
Start: 2022-09-02 | End: 2022-09-02

## 2022-09-02 RX ADMIN — SENNA PLUS 2 TABLET(S): 8.6 TABLET ORAL at 22:01

## 2022-09-02 RX ADMIN — Medication 975 MILLIGRAM(S): at 20:21

## 2022-09-02 RX ADMIN — HYDROMORPHONE HYDROCHLORIDE 1 MILLIGRAM(S): 2 INJECTION INTRAMUSCULAR; INTRAVENOUS; SUBCUTANEOUS at 05:45

## 2022-09-02 RX ADMIN — OXYCODONE HYDROCHLORIDE 10 MILLIGRAM(S): 5 TABLET ORAL at 22:30

## 2022-09-02 RX ADMIN — SODIUM CHLORIDE 125 MILLILITER(S): 9 INJECTION INTRAMUSCULAR; INTRAVENOUS; SUBCUTANEOUS at 03:11

## 2022-09-02 RX ADMIN — ONDANSETRON 4 MILLIGRAM(S): 8 TABLET, FILM COATED ORAL at 08:17

## 2022-09-02 RX ADMIN — LACTULOSE 10 GRAM(S): 10 SOLUTION ORAL at 10:57

## 2022-09-02 RX ADMIN — ONDANSETRON 4 MILLIGRAM(S): 8 TABLET, FILM COATED ORAL at 22:01

## 2022-09-02 RX ADMIN — OXYCODONE HYDROCHLORIDE 5 MILLIGRAM(S): 5 TABLET ORAL at 15:34

## 2022-09-02 RX ADMIN — HYDROMORPHONE HYDROCHLORIDE 1 MILLIGRAM(S): 2 INJECTION INTRAMUSCULAR; INTRAVENOUS; SUBCUTANEOUS at 00:51

## 2022-09-02 RX ADMIN — ENOXAPARIN SODIUM 40 MILLIGRAM(S): 100 INJECTION SUBCUTANEOUS at 05:27

## 2022-09-02 RX ADMIN — OXYCODONE HYDROCHLORIDE 5 MILLIGRAM(S): 5 TABLET ORAL at 12:07

## 2022-09-02 RX ADMIN — Medication 50 MILLIGRAM(S): at 10:58

## 2022-09-02 RX ADMIN — ONDANSETRON 4 MILLIGRAM(S): 8 TABLET, FILM COATED ORAL at 15:34

## 2022-09-02 RX ADMIN — OXYCODONE HYDROCHLORIDE 5 MILLIGRAM(S): 5 TABLET ORAL at 12:37

## 2022-09-02 RX ADMIN — OXYCODONE HYDROCHLORIDE 5 MILLIGRAM(S): 5 TABLET ORAL at 16:04

## 2022-09-02 RX ADMIN — POLYETHYLENE GLYCOL 3350 17 GRAM(S): 17 POWDER, FOR SOLUTION ORAL at 22:00

## 2022-09-02 RX ADMIN — Medication 975 MILLIGRAM(S): at 12:07

## 2022-09-02 RX ADMIN — Medication 1 TABLET(S): at 10:58

## 2022-09-02 RX ADMIN — OXYCODONE HYDROCHLORIDE 5 MILLIGRAM(S): 5 TABLET ORAL at 19:45

## 2022-09-02 RX ADMIN — OXYCODONE HYDROCHLORIDE 5 MILLIGRAM(S): 5 TABLET ORAL at 18:52

## 2022-09-02 RX ADMIN — Medication 975 MILLIGRAM(S): at 05:32

## 2022-09-02 RX ADMIN — Medication 975 MILLIGRAM(S): at 20:51

## 2022-09-02 RX ADMIN — OXYCODONE HYDROCHLORIDE 5 MILLIGRAM(S): 5 TABLET ORAL at 08:23

## 2022-09-02 RX ADMIN — HYDROMORPHONE HYDROCHLORIDE 1 MILLIGRAM(S): 2 INJECTION INTRAMUSCULAR; INTRAVENOUS; SUBCUTANEOUS at 05:30

## 2022-09-02 RX ADMIN — Medication 100 MILLIGRAM(S): at 03:11

## 2022-09-02 RX ADMIN — Medication 975 MILLIGRAM(S): at 16:29

## 2022-09-02 RX ADMIN — Medication 200 GRAM(S): at 10:57

## 2022-09-02 RX ADMIN — OXYCODONE HYDROCHLORIDE 5 MILLIGRAM(S): 5 TABLET ORAL at 00:00

## 2022-09-02 RX ADMIN — OXYCODONE HYDROCHLORIDE 10 MILLIGRAM(S): 5 TABLET ORAL at 22:00

## 2022-09-02 RX ADMIN — Medication 975 MILLIGRAM(S): at 05:27

## 2022-09-02 RX ADMIN — HYDROMORPHONE HYDROCHLORIDE 1 MILLIGRAM(S): 2 INJECTION INTRAMUSCULAR; INTRAVENOUS; SUBCUTANEOUS at 00:36

## 2022-09-02 NOTE — PHYSICAL THERAPY INITIAL EVALUATION ADULT - ADDITIONAL COMMENTS
Pt lives in a Apt  with 6 steps to enter with  rails and 18  stairs inside with  rails.  Pt owns medical equipment: SAC, HANS, [place], [place]   Pt lives with: Alone

## 2022-09-02 NOTE — PHYSICAL THERAPY INITIAL EVALUATION ADULT - ACTIVE RANGE OF MOTION EXAMINATION, REHAB EVAL
Right LE grossly 2/5 in a lot of pain and able to get to 90 hip flexion at edge of bed/bilateral upper extremity Active ROM was WFL (within functional limits)/Left LE Active ROM was WFL (within functional limits)/deficits as listed below

## 2022-09-02 NOTE — PHYSICAL THERAPY INITIAL EVALUATION ADULT - LEVEL OF INDEPENDENCE: STAIR NEGOTIATION, REHAB EVAL
[FreeTextEntry1] : \par   #1. The patient's COPD appears to be at relative baseline clinically and will continue her Spiriva and Symbicort\par #2. She will continue her Nasonex and Singulair for her postnasal drip and allergic rhinitis.\par #3. I have also recommended exercise to improve her exercise tolerance.\par #4. Thoracic surgery f/u as needed post resection of nodule c/w adenoCa. \par #5. Oncology f/u for h/o of breast Ca and lung Ca; she is followed by Dr. Redman for anemia\par #6. Continue hematology w/u for chronic anemia as required\par #7. F/u chest CT in 12 months year to re-evaluate nodules including stable new 3 mm RLL nodule\par #8. F/u in 6 months with repeat PFTs N/A

## 2022-09-02 NOTE — PROGRESS NOTE ADULT - SUBJECTIVE AND OBJECTIVE BOX
Pain of right hip    HPI:  62 y/o female who was accepted for transfer from [facility] by Dr. Byrnes for evaluation of previous right THR on 5/2/22. Patient was admitted to [facility] on 8/27 s/p fall and found to have fractured femoral prosthesis/non union and may need bone graft. She denies any LOC or head trauma from fall, describes fall as leg giving out. She was seen by Cardiology prior to transfer and deemed stable for surgery. Denies any tori-operative issues in 5/22 during last revision. Other medical history sig for Asthma, obesity, JOSE LUIS not on NIV, Migraines, HTN. Labs/Vitals stable, denies any recent illnesses or changes in medications. Denies SOB, CP, N/V, Diaphoresis, focal weakness.   (30 Aug 2022 21:40)    Interval History:  Patient was seen and examined at bedside around 8:15 am.  Pain is controlled with current medications.  Asking about Topamax as it was not given yesterday.   BP stable overnight.   Denies chest pain, palpitations, shortness of breath, headache, dizziness, visual symptoms, nausea, vomiting or abdominal pain.    ROS:  As per interval history otherwise unremarkable.    PHYSICAL EXAM:  Vital Signs   T(C): 36.4 (02 Sep 2022 10:35), Max: 37 (01 Sep 2022 18:00)  T(F): 97.6 (02 Sep 2022 10:35), Max: 98.6 (01 Sep 2022 18:00)  HR: 94 (02 Sep 2022 10:35) (72 - 94)  BP: 101/66 (02 Sep 2022 10:35) (83/63 - 121/76)  BP(mean): 64 (01 Sep 2022 18:00) (59 - 86)  RR: 18 (02 Sep 2022 10:35) (15 - 19)  SpO2: 97% (02 Sep 2022 10:35) (93% - 100%)  Parameters below as of 02 Sep 2022 10:35  Patient On (Oxygen Delivery Method): room air  General: Elderly female sitting in chair comfortably. No acute distress  HEENT: EOMI. Clear conjunctivae. Moist mucus membrane  Neck: Supple.   Chest: CTA bilaterally. No wheezing, rales or rhonchi.   Heart: Normal S1 & S2. RRR.   Abdomen: Non distended. Soft. Non-tender. + BS  Ext: No pedal edema. No calf tenderness. Dressing on right hip. Neurovascular intact.   Neuro: Active and alert. No focal deficit. No speech disorder  Skin: Warm and Dry  Psychiatry: Normal mood and affect    LABS:                        9.0    6.52  )-----------( 284      ( 02 Sep 2022 08:45 )             28.4     09-02    135  |  104  |  16.7  ----------------------------<  124<H>  3.8   |  21.0<L>  |  0.61    Ca    8.2<L>      02 Sep 2022 03:40    PT/INR - ( 01 Sep 2022 05:40 )   PT: 11.0 sec;   INR: 0.95 ratio      Blood Culture: 09-01 @ 08:59  Organism --  Gram Stain Blood -- Gram Stain   No polymorphonuclear leukocytes per low power field  No organisms seen per oil power field  Specimen Source .Tissue Right hip Bone Culture  Culture-Blood --    09-01 @ 08:56  Organism --  Gram Stain Blood -- Gram Stain   No polymorphonuclear leukocytes per low power field  No organisms seen per oil power field  Specimen Source .Tissue Right Hip Culture 2  Culture-Blood --    09-01 @ 08:55  Organism --  Gram Stain Blood -- Gram Stain   Few polymorphonuclear leukocytes per low power field  No organisms seen per oil power field  Specimen Source .Tissue Right Hip Tissue for C&S  Culture-Blood --    RADIOLOGY & ADDITIONAL STUDIES:  Reviewed     MEDICATIONS  (STANDING):  acetaminophen     Tablet .. 975 milliGRAM(s) Oral every 8 hours  enoxaparin Injectable 40 milliGRAM(s) SubCutaneous every 24 hours  multivitamin 1 Tablet(s) Oral daily  polyethylene glycol 3350 17 Gram(s) Oral at bedtime  senna 2 Tablet(s) Oral at bedtime  sodium chloride 0.9%. 1000 milliLiter(s) (125 mL/Hr) IV Continuous <Continuous>  topiramate 50 milliGRAM(s) Oral daily    MEDICATIONS  (PRN):  artificial  tears Solution 1 Drop(s) Both EYES every 4 hours PRN Dry Eyes  hydrALAZINE Injectable 10 milliGRAM(s) IV Push every 6 hours PRN If SBP > 160  magnesium hydroxide Suspension 30 milliLiter(s) Oral daily PRN Constipation  ondansetron Injectable 4 milliGRAM(s) IV Push every 6 hours PRN Nausea and/or Vomiting  oxyCODONE    IR 5 milliGRAM(s) Oral every 3 hours PRN Mild Pain (1 - 3)  oxyCODONE    IR 10 milliGRAM(s) Oral every 3 hours PRN Moderate Pain (4 - 6)

## 2022-09-02 NOTE — OCCUPATIONAL THERAPY INITIAL EVALUATION ADULT - ADDITIONAL COMMENTS
Pt has 2 shower stalls with doors  Pt owns a RW, quad cane, raised toilet seat, shower chair, reacher, and transport chair  Pt is right handed

## 2022-09-02 NOTE — PROGRESS NOTE ADULT - SUBJECTIVE AND OBJECTIVE BOX
· Subjective and Objective:     History: Patient is status post RIGHT GT orif on 9/1, POD # 1.   Patient is doing well and is comfortable.   The patient's pain is controlled using the prescribed pain medications.   Denies nausea, vomiting, chest pain, shortness of breath, abdominal pain or fever. No new complaints.  Pt has not been OOB with PT yet    Vital Signs Last 24 Hrs  T(C): 36.8 (02 Sep 2022 04:52), Max: 37.4 (01 Sep 2022 12:30)  T(F): 98.2 (02 Sep 2022 04:52), Max: 99.3 (01 Sep 2022 12:30)  HR: 73 (02 Sep 2022 04:52) (72 - 90)  BP: 108/63 (02 Sep 2022 04:52) (82/60 - 121/76)  BP(mean): 64 (01 Sep 2022 18:00) (44 - 86)  RR: 18 (02 Sep 2022 04:52) (12 - 19)  SpO2: 93% (02 Sep 2022 04:52) (93% - 100%)          Physical exam:   The right hip dressing is clean, dry and intact. No drainage or discharge.   The calf is supple nontender.  No calf tenderness.   Sensation to light touch is grossly intact distally. (baseline neuropathy)    Motor function distally is 5/5. No foot drop.   2+ dorsalis pedis pulse. Capillary refill is less than 2 seconds. No cyanosis.    Primary Orthopedic Assessment:  • s/p RIGHT GT open reduction internal fixation pod#1    Plan:   • DVT prophylaxis lovenox, including use of compression devices and ankle pumps  • physical therapy WBAT  • Weightbearing as tolerated of the RIGHT lower extremity with assistance of a walker. GT precautions.   • Incentive spirometry encouraged  • Pain control as clinically indicated

## 2022-09-03 LAB
-  AMIKACIN: SIGNIFICANT CHANGE UP
-  AMIKACIN: SIGNIFICANT CHANGE UP
-  AZTREONAM: SIGNIFICANT CHANGE UP
-  AZTREONAM: SIGNIFICANT CHANGE UP
-  CEFEPIME: SIGNIFICANT CHANGE UP
-  CEFEPIME: SIGNIFICANT CHANGE UP
-  CEFTAZIDIME: SIGNIFICANT CHANGE UP
-  CEFTAZIDIME: SIGNIFICANT CHANGE UP
-  CIPROFLOXACIN: SIGNIFICANT CHANGE UP
-  CIPROFLOXACIN: SIGNIFICANT CHANGE UP
-  GENTAMICIN: SIGNIFICANT CHANGE UP
-  GENTAMICIN: SIGNIFICANT CHANGE UP
-  IMIPENEM: SIGNIFICANT CHANGE UP
-  IMIPENEM: SIGNIFICANT CHANGE UP
-  LEVOFLOXACIN: SIGNIFICANT CHANGE UP
-  LEVOFLOXACIN: SIGNIFICANT CHANGE UP
-  MEROPENEM: SIGNIFICANT CHANGE UP
-  MEROPENEM: SIGNIFICANT CHANGE UP
-  PIPERACILLIN/TAZOBACTAM: SIGNIFICANT CHANGE UP
-  PIPERACILLIN/TAZOBACTAM: SIGNIFICANT CHANGE UP
-  TOBRAMYCIN: SIGNIFICANT CHANGE UP
-  TOBRAMYCIN: SIGNIFICANT CHANGE UP
ANION GAP SERPL CALC-SCNC: 7 MMOL/L — SIGNIFICANT CHANGE UP (ref 5–17)
BLD GP AB SCN SERPL QL: SIGNIFICANT CHANGE UP
BUN SERPL-MCNC: 14.1 MG/DL — SIGNIFICANT CHANGE UP (ref 8–20)
CALCIUM SERPL-MCNC: 8.1 MG/DL — LOW (ref 8.4–10.5)
CHLORIDE SERPL-SCNC: 105 MMOL/L — SIGNIFICANT CHANGE UP (ref 98–107)
CO2 SERPL-SCNC: 21 MMOL/L — LOW (ref 22–29)
CREAT SERPL-MCNC: 0.51 MG/DL — SIGNIFICANT CHANGE UP (ref 0.5–1.3)
EGFR: 105 ML/MIN/1.73M2 — SIGNIFICANT CHANGE UP
GLUCOSE SERPL-MCNC: 98 MG/DL — SIGNIFICANT CHANGE UP (ref 70–99)
HCT VFR BLD CALC: 24.3 % — LOW (ref 34.5–45)
HGB BLD-MCNC: 7.5 G/DL — LOW (ref 11.5–15.5)
MAGNESIUM SERPL-MCNC: 2.1 MG/DL — SIGNIFICANT CHANGE UP (ref 1.6–2.6)
MCHC RBC-ENTMCNC: 29.2 PG — SIGNIFICANT CHANGE UP (ref 27–34)
MCHC RBC-ENTMCNC: 30.9 GM/DL — LOW (ref 32–36)
MCV RBC AUTO: 94.6 FL — SIGNIFICANT CHANGE UP (ref 80–100)
METHOD TYPE: SIGNIFICANT CHANGE UP
METHOD TYPE: SIGNIFICANT CHANGE UP
PLATELET # BLD AUTO: 204 K/UL — SIGNIFICANT CHANGE UP (ref 150–400)
POTASSIUM SERPL-MCNC: 5.2 MMOL/L — SIGNIFICANT CHANGE UP (ref 3.5–5.3)
POTASSIUM SERPL-SCNC: 5.2 MMOL/L — SIGNIFICANT CHANGE UP (ref 3.5–5.3)
RBC # BLD: 2.57 M/UL — LOW (ref 3.8–5.2)
RBC # FLD: 14.7 % — HIGH (ref 10.3–14.5)
SODIUM SERPL-SCNC: 133 MMOL/L — LOW (ref 135–145)
WBC # BLD: 5.45 K/UL — SIGNIFICANT CHANGE UP (ref 3.8–10.5)
WBC # FLD AUTO: 5.45 K/UL — SIGNIFICANT CHANGE UP (ref 3.8–10.5)

## 2022-09-03 PROCEDURE — 99233 SBSQ HOSP IP/OBS HIGH 50: CPT

## 2022-09-03 PROCEDURE — 99223 1ST HOSP IP/OBS HIGH 75: CPT

## 2022-09-03 RX ORDER — CEFAZOLIN SODIUM 1 G
2000 VIAL (EA) INJECTION EVERY 8 HOURS
Refills: 0 | Status: DISCONTINUED | OUTPATIENT
Start: 2022-09-03 | End: 2022-09-03

## 2022-09-03 RX ORDER — CEFAZOLIN SODIUM 1 G
VIAL (EA) INJECTION
Refills: 0 | Status: DISCONTINUED | OUTPATIENT
Start: 2022-09-03 | End: 2022-09-03

## 2022-09-03 RX ORDER — CEFAZOLIN SODIUM 1 G
2000 VIAL (EA) INJECTION ONCE
Refills: 0 | Status: COMPLETED | OUTPATIENT
Start: 2022-09-03 | End: 2022-09-03

## 2022-09-03 RX ORDER — CEFEPIME 1 G/1
2000 INJECTION, POWDER, FOR SOLUTION INTRAMUSCULAR; INTRAVENOUS EVERY 8 HOURS
Refills: 0 | Status: DISCONTINUED | OUTPATIENT
Start: 2022-09-03 | End: 2022-09-07

## 2022-09-03 RX ORDER — FERROUS SULFATE 325(65) MG
325 TABLET ORAL DAILY
Refills: 0 | Status: DISCONTINUED | OUTPATIENT
Start: 2022-09-03 | End: 2022-09-07

## 2022-09-03 RX ADMIN — OXYCODONE HYDROCHLORIDE 10 MILLIGRAM(S): 5 TABLET ORAL at 22:04

## 2022-09-03 RX ADMIN — OXYCODONE HYDROCHLORIDE 10 MILLIGRAM(S): 5 TABLET ORAL at 17:33

## 2022-09-03 RX ADMIN — OXYCODONE HYDROCHLORIDE 10 MILLIGRAM(S): 5 TABLET ORAL at 02:31

## 2022-09-03 RX ADMIN — CEFEPIME 100 MILLIGRAM(S): 1 INJECTION, POWDER, FOR SOLUTION INTRAMUSCULAR; INTRAVENOUS at 13:55

## 2022-09-03 RX ADMIN — OXYCODONE HYDROCHLORIDE 10 MILLIGRAM(S): 5 TABLET ORAL at 18:10

## 2022-09-03 RX ADMIN — OXYCODONE HYDROCHLORIDE 10 MILLIGRAM(S): 5 TABLET ORAL at 09:50

## 2022-09-03 RX ADMIN — Medication 975 MILLIGRAM(S): at 11:46

## 2022-09-03 RX ADMIN — OXYCODONE HYDROCHLORIDE 10 MILLIGRAM(S): 5 TABLET ORAL at 05:51

## 2022-09-03 RX ADMIN — Medication 1 TABLET(S): at 11:46

## 2022-09-03 RX ADMIN — Medication 975 MILLIGRAM(S): at 12:16

## 2022-09-03 RX ADMIN — OXYCODONE HYDROCHLORIDE 10 MILLIGRAM(S): 5 TABLET ORAL at 09:17

## 2022-09-03 RX ADMIN — OXYCODONE HYDROCHLORIDE 10 MILLIGRAM(S): 5 TABLET ORAL at 14:50

## 2022-09-03 RX ADMIN — OXYCODONE HYDROCHLORIDE 10 MILLIGRAM(S): 5 TABLET ORAL at 14:14

## 2022-09-03 RX ADMIN — Medication 975 MILLIGRAM(S): at 21:34

## 2022-09-03 RX ADMIN — OXYCODONE HYDROCHLORIDE 10 MILLIGRAM(S): 5 TABLET ORAL at 21:34

## 2022-09-03 RX ADMIN — Medication 100 MILLIGRAM(S): at 09:17

## 2022-09-03 RX ADMIN — Medication 975 MILLIGRAM(S): at 06:12

## 2022-09-03 RX ADMIN — ONDANSETRON 4 MILLIGRAM(S): 8 TABLET, FILM COATED ORAL at 13:54

## 2022-09-03 RX ADMIN — Medication 50 MILLIGRAM(S): at 11:46

## 2022-09-03 RX ADMIN — ENOXAPARIN SODIUM 40 MILLIGRAM(S): 100 INJECTION SUBCUTANEOUS at 05:42

## 2022-09-03 RX ADMIN — CEFEPIME 100 MILLIGRAM(S): 1 INJECTION, POWDER, FOR SOLUTION INTRAMUSCULAR; INTRAVENOUS at 21:34

## 2022-09-03 RX ADMIN — SENNA PLUS 2 TABLET(S): 8.6 TABLET ORAL at 21:35

## 2022-09-03 RX ADMIN — Medication 975 MILLIGRAM(S): at 05:42

## 2022-09-03 RX ADMIN — OXYCODONE HYDROCHLORIDE 10 MILLIGRAM(S): 5 TABLET ORAL at 03:01

## 2022-09-03 RX ADMIN — POLYETHYLENE GLYCOL 3350 17 GRAM(S): 17 POWDER, FOR SOLUTION ORAL at 21:35

## 2022-09-03 RX ADMIN — ONDANSETRON 4 MILLIGRAM(S): 8 TABLET, FILM COATED ORAL at 21:35

## 2022-09-03 RX ADMIN — Medication 975 MILLIGRAM(S): at 22:04

## 2022-09-03 RX ADMIN — OXYCODONE HYDROCHLORIDE 10 MILLIGRAM(S): 5 TABLET ORAL at 06:21

## 2022-09-03 NOTE — PROGRESS NOTE ADULT - SUBJECTIVE AND OBJECTIVE BOX
HEALTH ISSUES - PROBLEM Dx:  Asthma, obesity, JOSE LUIS not on NIV, Migraines, HTN.    fibrous nonunion proximal femur s/p repair    INTERVAL HPI/ OVERNIGHT EVENTS:    lying in bed  comfortable  has multiple questions regarding planned BT  explained all  discussed plan of care    REVIEW OF SYSTEMS:    as above    Vital Signs Last 24 Hrs  T(C): 37.2 (03 Sep 2022 17:16), Max: 37.4 (03 Sep 2022 04:12)  T(F): 98.9 (03 Sep 2022 17:16), Max: 99.3 (03 Sep 2022 04:12)  HR: 91 (03 Sep 2022 17:16) (80 - 94)  BP: 110/68 (03 Sep 2022 17:16) (110/68 - 118/73)  BP(mean): --  RR: 18 (03 Sep 2022 17:16) (18 - 19)  SpO2: 95% (03 Sep 2022 17:16) (92% - 98%)    Parameters below as of 03 Sep 2022 17:16  Patient On (Oxygen Delivery Method): room air    PHYSICAL EXAM-  GENERAL: Comfortable, pain controlled  HEAD:  Atraumatic, Normocephalic  EYES: EOMI, PERRLA, conjunctiva and sclera clear  ENT: Moist mucous membranes, No lesions  NECK: Supple, No JVD, Normal thyroid  NERVOUS SYSTEM:  Alert & Oriented X 3, Moving tereza UE and left LE, right LE ankle full flexion and knee min flexion, Dressings C/D/I  CHEST/LUNG: CTA bilaterally; No rales, rhonchi, wheezing, or rubs  HEART: Regular rate and rhythm; No murmurs, rubs, or gallops  ABDOMEN: Soft, Nontender, Nondistended; Bowel sounds present  EXTREMITIES:  2+ Peripheral Pulses, No clubbing, cyanosis, or edema  SKIN: No rashes or lesions    MEDICATIONS  (STANDING):  acetaminophen     Tablet .. 975 milliGRAM(s) Oral every 8 hours  cefepime   IVPB 2000 milliGRAM(s) IV Intermittent every 8 hours  enoxaparin Injectable 40 milliGRAM(s) SubCutaneous every 24 hours  multivitamin 1 Tablet(s) Oral daily  polyethylene glycol 3350 17 Gram(s) Oral at bedtime  senna 2 Tablet(s) Oral at bedtime  topiramate 50 milliGRAM(s) Oral daily    MEDICATIONS  (PRN):  artificial  tears Solution 1 Drop(s) Both EYES every 4 hours PRN Dry Eyes  hydrALAZINE Injectable 10 milliGRAM(s) IV Push every 6 hours PRN If SBP > 160  magnesium hydroxide Suspension 30 milliLiter(s) Oral daily PRN Constipation  ondansetron Injectable 4 milliGRAM(s) IV Push every 6 hours PRN Nausea and/or Vomiting  oxyCODONE    IR 5 milliGRAM(s) Oral every 3 hours PRN Mild Pain (1 - 3)  oxyCODONE    IR 10 milliGRAM(s) Oral every 3 hours PRN Moderate Pain (4 - 6)      LABS:                        7.5    5.45  )-----------( 204      ( 03 Sep 2022 05:36 )             24.3     09-03    133<L>  |  105  |  14.1  ----------------------------<  98  5.2   |  21.0<L>  |  0.51    Ca    8.1<L>      03 Sep 2022 05:36  Mg     2.1     09-03

## 2022-09-03 NOTE — PROGRESS NOTE ADULT - SUBJECTIVE AND OBJECTIVE BOX
Ortho Post Op Check    Name: SUSY CHOUDHURY    MR #: [id number]    Procedure: s/p right hip ORIF periprosthetic fx/nonunion pod #2  Surgeon: Charlene    Pt comfortable without complaints, pain controlled at the time.  Pt has been OOB to chair.  Denies CP, SOB, N/V, numbness/tingling     General Exam:  Vital Signs Last 24 Hrs  T(C): 37.4 (09-03-22 @ 04:12), Max: 37.4 (09-03-22 @ 04:12)  T(F): 99.3 (09-03-22 @ 04:12), Max: 99.3 (09-03-22 @ 04:12)  HR: 92 (09-03-22 @ 04:12) (92 - 92)  BP: 111/70 (09-03-22 @ 04:12) (111/70 - 111/70)  BP(mean): --  RR: 18 (09-03-22 @ 04:12) (18 - 18)  SpO2: 97% (09-03-22 @ 04:12) (97% - 97%)    General: Pt Alert and oriented, NAD, controlled pain.  Sitting in chair at bedside  Dressings C/D/I. No bleeding.  Pulses: 2+ dorsalis pedis pulse. Cap refill < 2 sec.  Sensation: Grossly intact to light touch without deficit.  Motor: + EHL/FHL/TA/GS      Culture - Tissue with Gram Stain (collected 01 Sep 2022 08:59)  Source: .Tissue Right hip Bone Culture  Gram Stain (01 Sep 2022 21:57):    No polymorphonuclear leukocytes per low power field    No organisms seen per oil power field  Preliminary Report (02 Sep 2022 18:45):    Rare Pseudomonas aeruginosa    Culture - Tissue with Gram Stain (collected 01 Sep 2022 08:56)  Source: .Tissue Right Hip Culture 3  Gram Stain (01 Sep 2022 21:57):    No polymorphonuclear leukocytes per low power field    No organisms seen per oil power field  Preliminary Report (03 Sep 2022 00:42):    No growth    Culture - Tissue with Gram Stain (collected 01 Sep 2022 08:56)  Source: .Tissue Right Hip Culture 2  Gram Stain (01 Sep 2022 21:56):    No polymorphonuclear leukocytes per low power field    No organisms seen per oil power field  Preliminary Report (02 Sep 2022 18:40):    Rare Pseudomonas aeruginosa    Culture - Tissue with Gram Stain (collected 01 Sep 2022 08:55)  Source: .Tissue Right Hip Culture 1  Gram Stain (01 Sep 2022 21:58):    No polymorphonuclear leukocytes per low power field    No organisms seen per oil power field  Preliminary Report (02 Sep 2022 22:22):    Rare Pseudomonas aeruginosa    See previous culture [id number]    Culture - Tissue with Gram Stain (collected 01 Sep 2022 08:55)  Source: .Tissue Right Hip Tissue for C&amp;S  Gram Stain (01 Sep 2022 21:57):    Few polymorphonuclear leukocytes per low power field    No organisms seen per oil power field  Preliminary Report (02 Sep 2022 18:48):    No growth            A/P: 61yFemale POD#2 s/p right hip ORIF periprosthetic fx  - Stable  - Pain Control  - DVT ppx: lovenox  - Continue to follow OR Cultures until final (PRELIM Pseudomonas)  - Continue Ancef until further Abx rec from ID  - ID consult called   - PT   - Weight bearing status: WBAT

## 2022-09-03 NOTE — CONSULT NOTE ADULT - SUBJECTIVE AND OBJECTIVE BOX
[facility]  INFECTIOUS DISEASES at [place] and [place]  =======================================================                               Curt Gallegos MD#  Jose Reyna MD*                                     Tanja Oliva MD*    Radha Stevens MD*            Diplomates American Board of Internal Medicine & Infectious Diseases                # [place] Office - Appt - Tel  [phone] Fax [phone]                * [place] Office - Appt - Tel [phone] Fax [phone]                                  Hospital Consult line:  [phone]  =======================================================      [facility]-[id number]  SUSY CHOUDHURY    CC: Patient is a 61y old  Female who presents with a chief complaint of Transfer for continuity of care for right hip non-union (03 Sep 2022 08:56)      61y  Female with h/o Asthma, obesity, JOSE LUIS not on NIV, Migraines, HTN. Patient is s/p right THR (2017 at \[facility]\"" ) then found to have a fractured femoral prosthesis, revision performed through an extended trochanteric osteotomy 22. Patient  now  presented to [facility] s/p fall found to have a possible extended trochanteric osteotomy non union and was transferred to [facility] for evaluation of previous right THR on 22. She denies any LOC or head trauma from fall, describes fall as leg giving out. She was seen by Cardiology a [facility] prior to transfer and deemed stable for surgery. Denies any tori-operative issues in  during last revision. Here patient is s/p RT GT open reduction internal fixation 22. Post op patient was on Cefazolin, OR cultures with Pseudomonas. ID input requested.       Past Medical & Surgical Hx:  Failure of right total hip arthroplasty, initial encounter  Hypertension  OA (osteoarthritis)  Broken internal right hip prosthesis, initial encounter  JOSE LUIS (obstructive sleep apnea)  Mild asthma  History of bunionectomy right foot   S/P excision of lipoma [id number]  History of total right hip replacement 2017 at \[facility]\""  H/O  section       Social Hx:  Denies smoking       FAMILY HISTORY:  FHx: malignant neoplasm (Mother)  ovarian cancer  FH: epilepsy (Father)  FH: arthritis (Father)  FH: diabetes mellitus (Grandparent)  FH: hypertension (Mother)      Allergies  No Known Allergies  Intolerances  opioid-like analgesics (Vomiting; Nausea)       REVIEW OF SYSTEMS:  CONSTITUTIONAL:  No Fever or chills  HEENT:  No diplopia or blurred vision.  No earache, sore throat or runny nose.  CARDIOVASCULAR:  No chest pain  RESPIRATORY:  No cough, shortness of breath  GASTROINTESTINAL:  No nausea, vomiting or diarrhea.  GENITOURINARY:  No dysuria, frequency or urgency. No Blood in urine  MUSCULOSKELETAL:  no joint aches, no muscle pain  SKIN:  No change in skin, hair or nails.  NEUROLOGIC:  No Headaches, seizures  PSYCHIATRIC:  No disorder of thought or mood.  ENDOCRINE:  No heat or cold intolerance  HEMATOLOGICAL:  No easy bruising or bleeding.       Physical Exam:  GEN: NAD  HEENT: normocephalic and atraumatic. EOMI. PERRL.    NECK: Supple.   LUNGS: CTA B/L.  HEART: RRR  ABDOMEN: Soft, NT, ND.  +BS.    : No CVA tenderness  EXTREMITIES: Without  edema.  MSK: No joint swelling  NEUROLOGIC: No Focal Deficits   PSYCHIATRIC: Appropriate affect .  SKIN: Rt Hip with dressing       Vitals:  T(F): 98.8 (03 Sep 2022 10:44), Max: 99.3 (03 Sep 2022 04:12)  HR: 80 (03 Sep 2022 10:44)  BP: 112/71 (03 Sep 2022 10:44)  RR: 18 (03 Sep 2022 10:44)  SpO2: 92% (03 Sep 2022 10:44) (92% - 98%)  temp max in last 48H T(F): , Max: 99.3 (22 @ 04:12)      Current Antibiotics:  cefepime   IVPB 2000 milliGRAM(s) IV Intermittent every 8 hours    Other medications:  acetaminophen     Tablet .. 975 milliGRAM(s) Oral every 8 hours  enoxaparin Injectable 40 milliGRAM(s) SubCutaneous every 24 hours  multivitamin 1 Tablet(s) Oral daily  polyethylene glycol 3350 17 Gram(s) Oral at bedtime  senna 2 Tablet(s) Oral at bedtime  topiramate 50 milliGRAM(s) Oral daily               7.5    5.45  )-----------( 204      ( 03 Sep 2022 05:36 )             24.3         133<L>  |  105  |  14.1  ----------------------------<  98  5.2   |  21.0<L>  |  0.51    Ca    8.1<L>      03 Sep 2022 05:36  Mg     2.1           RECENT CULTURES:   @ 08:59 .Tissue Right hip Bone Culture     Rare Pseudomonas aeruginosa  No polymorphonuclear leukocytes per low power field  No organisms seen per oil power field     @ 08:56 .Tissue Right Hip Culture 2     Rare Pseudomonas aeruginosa  No polymorphonuclear leukocytes per low power field  No organisms seen per oil power field     @ 08:55 .Tissue Right Hip Tissue for C&S     No growth  Few polymorphonuclear leukocytes per low power field  No organisms seen per oil power field      WBC Count: 5.45 K/uL (22 @ 05:36)  WBC Count: 6.52 K/uL (22 @ 08:45)  WBC Count: 6.51 K/uL (22 @ 03:40)  WBC Count: 13.60 K/uL (22 @ 12:50)  WBC Count: 5.62 K/uL (22 @ 04:41)  WBC Count: 4.96 K/uL (22 @ 06:41)    Creatinine, Serum: 0.51 mg/dL (22 @ 05:36)  Creatinine, Serum: 0.61 mg/dL (22 @ 03:40)  Creatinine, Serum: 0.76 mg/dL (22 @ 04:41)  Creatinine, Serum: 0.79 mg/dL (22 @ 06:52)    COVID-19 PCR: NotDetec (22 @ 15:53)  COVID-19 PCR: NotDetec (22 @ 15:18)        < from: Xray Hip 2-3 Views, Right (22 @ 14:29) >    ACC: [id number] EXAM:  XR FEMUR 2 VIEWS RT                        ACC: [id number] EXAM:  XR HIP 2-3V RT                          PROCEDURE DATE:  2022      INTERPRETATION:  Right hip and right femur. Postop study. On    right hip replacement with a long femoral stem was noted. There is a   periprosthetic fracture of the right upper outer femur.    Right hip. Single postop study.    Additional lateral hardware has been applied with good alignment.    Right femur. 3 views.    The lateral hardware extends down almost the entire shaft.    Alignment is good.    IMPRESSION: Postop studies as above.    --- End of Report ---    < end of copied text >

## 2022-09-03 NOTE — PROVIDER CONTACT NOTE (CRITICAL VALUE NOTIFICATION) - NS PROVIDER READ BACK TO LAB
yes PROVIDER:[TOKEN:[[id number]]] PROVIDER:[TOKEN:[[id number]]],PROVIDER:[TOKEN:[[id number]]]

## 2022-09-03 NOTE — CONSULT NOTE ADULT - REASON FOR ADMISSION
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

## 2022-09-03 NOTE — CONSULT NOTE ADULT - ASSESSMENT
61y  Female with h/o Asthma, obesity, JOSE LUIS not on NIV, Migraines, HTN. Patient is s/p right THR (2017 at [facility] ) then found to have a fractured femoral prosthesis, revision performed through an extended trochanteric osteotomy 5/2/22. Patient  now  presented to [facility] s/p fall found to have a possible extended trochanteric osteotomy non union and was transferred to [facility] for evaluation of previous right THR on 5/2/22. She denies any LOC or head trauma from fall, describes fall as leg giving out. She was seen by Cardiology a [facility] prior to transfer and deemed stable for surgery. Denies any tori-operative issues in 5/22 during last revision. Here patient is s/p RT GT open reduction internal fixation 9/1/22. Post op patient was on Cefazolin, OR cultures with Pseudomonas.      Surgical site infection with pseudomonas   s/p RT GT open reduction internal fixation 9/1/22  s/p Fall   extended trochanteric osteotomy non union       - Blood cultures ordered  - OR cultures pending  cultures   - XR Rt Hip post op fixation   - D/C Cefazolin  - Start Cefepime 2gm IV q 8hours    - ? If concern for prosthetic hip infection   - Follow up cultures  - Trend Fever  - Trend WBC      Thank you for allowing me to participate in the care of your patient.   Will Follow    d/w Orthopedics

## 2022-09-04 LAB
ANION GAP SERPL CALC-SCNC: 7 MMOL/L — SIGNIFICANT CHANGE UP (ref 5–17)
BUN SERPL-MCNC: 13.4 MG/DL — SIGNIFICANT CHANGE UP (ref 8–20)
CALCIUM SERPL-MCNC: 8.4 MG/DL — SIGNIFICANT CHANGE UP (ref 8.4–10.5)
CHLORIDE SERPL-SCNC: 106 MMOL/L — SIGNIFICANT CHANGE UP (ref 98–107)
CO2 SERPL-SCNC: 25 MMOL/L — SIGNIFICANT CHANGE UP (ref 22–29)
CREAT SERPL-MCNC: 0.59 MG/DL — SIGNIFICANT CHANGE UP (ref 0.5–1.3)
EGFR: 102 ML/MIN/1.73M2 — SIGNIFICANT CHANGE UP
GLUCOSE SERPL-MCNC: 100 MG/DL — HIGH (ref 70–99)
HCT VFR BLD CALC: 26.1 % — LOW (ref 34.5–45)
HGB BLD-MCNC: 8.4 G/DL — LOW (ref 11.5–15.5)
MAGNESIUM SERPL-MCNC: 2.1 MG/DL — SIGNIFICANT CHANGE UP (ref 1.6–2.6)
MCHC RBC-ENTMCNC: 29.5 PG — SIGNIFICANT CHANGE UP (ref 27–34)
MCHC RBC-ENTMCNC: 32.2 GM/DL — SIGNIFICANT CHANGE UP (ref 32–36)
MCV RBC AUTO: 91.6 FL — SIGNIFICANT CHANGE UP (ref 80–100)
PHOSPHATE SERPL-MCNC: 2.7 MG/DL — SIGNIFICANT CHANGE UP (ref 2.4–4.7)
PLATELET # BLD AUTO: 284 K/UL — SIGNIFICANT CHANGE UP (ref 150–400)
POTASSIUM SERPL-MCNC: 4.5 MMOL/L — SIGNIFICANT CHANGE UP (ref 3.5–5.3)
POTASSIUM SERPL-SCNC: 4.5 MMOL/L — SIGNIFICANT CHANGE UP (ref 3.5–5.3)
RBC # BLD: 2.85 M/UL — LOW (ref 3.8–5.2)
RBC # FLD: 15.5 % — HIGH (ref 10.3–14.5)
SODIUM SERPL-SCNC: 138 MMOL/L — SIGNIFICANT CHANGE UP (ref 135–145)
WBC # BLD: 6.07 K/UL — SIGNIFICANT CHANGE UP (ref 3.8–10.5)
WBC # FLD AUTO: 6.07 K/UL — SIGNIFICANT CHANGE UP (ref 3.8–10.5)

## 2022-09-04 PROCEDURE — 99232 SBSQ HOSP IP/OBS MODERATE 35: CPT

## 2022-09-04 RX ADMIN — ONDANSETRON 4 MILLIGRAM(S): 8 TABLET, FILM COATED ORAL at 23:58

## 2022-09-04 RX ADMIN — OXYCODONE HYDROCHLORIDE 5 MILLIGRAM(S): 5 TABLET ORAL at 17:25

## 2022-09-04 RX ADMIN — ONDANSETRON 4 MILLIGRAM(S): 8 TABLET, FILM COATED ORAL at 11:10

## 2022-09-04 RX ADMIN — OXYCODONE HYDROCHLORIDE 10 MILLIGRAM(S): 5 TABLET ORAL at 05:15

## 2022-09-04 RX ADMIN — OXYCODONE HYDROCHLORIDE 10 MILLIGRAM(S): 5 TABLET ORAL at 11:03

## 2022-09-04 RX ADMIN — Medication 975 MILLIGRAM(S): at 21:47

## 2022-09-04 RX ADMIN — CEFEPIME 100 MILLIGRAM(S): 1 INJECTION, POWDER, FOR SOLUTION INTRAMUSCULAR; INTRAVENOUS at 21:45

## 2022-09-04 RX ADMIN — OXYCODONE HYDROCHLORIDE 5 MILLIGRAM(S): 5 TABLET ORAL at 18:13

## 2022-09-04 RX ADMIN — Medication 975 MILLIGRAM(S): at 16:22

## 2022-09-04 RX ADMIN — ONDANSETRON 4 MILLIGRAM(S): 8 TABLET, FILM COATED ORAL at 17:25

## 2022-09-04 RX ADMIN — OXYCODONE HYDROCHLORIDE 10 MILLIGRAM(S): 5 TABLET ORAL at 01:53

## 2022-09-04 RX ADMIN — ENOXAPARIN SODIUM 40 MILLIGRAM(S): 100 INJECTION SUBCUTANEOUS at 04:49

## 2022-09-04 RX ADMIN — OXYCODONE HYDROCHLORIDE 10 MILLIGRAM(S): 5 TABLET ORAL at 01:23

## 2022-09-04 RX ADMIN — Medication 975 MILLIGRAM(S): at 05:30

## 2022-09-04 RX ADMIN — Medication 975 MILLIGRAM(S): at 04:49

## 2022-09-04 RX ADMIN — Medication 50 MILLIGRAM(S): at 11:03

## 2022-09-04 RX ADMIN — POLYETHYLENE GLYCOL 3350 17 GRAM(S): 17 POWDER, FOR SOLUTION ORAL at 21:47

## 2022-09-04 RX ADMIN — OXYCODONE HYDROCHLORIDE 10 MILLIGRAM(S): 5 TABLET ORAL at 12:11

## 2022-09-04 RX ADMIN — Medication 975 MILLIGRAM(S): at 22:47

## 2022-09-04 RX ADMIN — SENNA PLUS 2 TABLET(S): 8.6 TABLET ORAL at 21:45

## 2022-09-04 RX ADMIN — OXYCODONE HYDROCHLORIDE 10 MILLIGRAM(S): 5 TABLET ORAL at 04:49

## 2022-09-04 RX ADMIN — Medication 325 MILLIGRAM(S): at 11:03

## 2022-09-04 RX ADMIN — CEFEPIME 100 MILLIGRAM(S): 1 INJECTION, POWDER, FOR SOLUTION INTRAMUSCULAR; INTRAVENOUS at 04:50

## 2022-09-04 RX ADMIN — Medication 975 MILLIGRAM(S): at 14:42

## 2022-09-04 RX ADMIN — Medication 1 TABLET(S): at 11:03

## 2022-09-04 RX ADMIN — CEFEPIME 100 MILLIGRAM(S): 1 INJECTION, POWDER, FOR SOLUTION INTRAMUSCULAR; INTRAVENOUS at 14:42

## 2022-09-04 NOTE — PROVIDER CONTACT NOTE (CHANGE IN STATUS NOTIFICATION) - SITUATION
LAB called with results from tissue culture taken on 9/1/2022 resulting positive for psuedomonis aeruginoa.

## 2022-09-04 NOTE — PROGRESS NOTE ADULT - SUBJECTIVE AND OBJECTIVE BOX
Ortho Post Op Check  Name: SUSY CHOUDHURY  MR #: [id number]    Procedure: s/p right hip ORIF periprosthetic fx/nonunion pod #3  Surgeon: Charlene    Pt comfortable without complaints, pain controlled at the time.  Pt has been OOB to chair.  Denies CP, SOB, N/V, numbness/tingling     Vital Signs Last 24 Hrs  T(C): 36.9 (04 Sep 2022 04:11), Max: 37.2 (03 Sep 2022 17:16)  T(F): 98.4 (04 Sep 2022 04:11), Max: 98.9 (03 Sep 2022 17:16)  HR: 82 (04 Sep 2022 04:11) (80 - 91)  BP: 111/72 (04 Sep 2022 04:11) (107/66 - 118/73)  BP(mean): --  RR: 18 (04 Sep 2022 04:11) (18 - 19)  SpO2: 96% (04 Sep 2022 04:11) (92% - 96%)    Parameters below as of 04 Sep 2022 04:11  Patient On (Oxygen Delivery Method): room air                            8.4    6.07  )-----------( 284      ( 04 Sep 2022 05:53 )             26.1     09-04    138  |  106  |  13.4  ----------------------------<  100<H>  4.5   |  25.0  |  0.59    Ca    8.4      04 Sep 2022 05:53  Phos  2.7     09-04  Mg     2.1     09-04        General: Pt Alert and oriented, NAD, controlled pain.    Dressings C/D/I. No bleeding.  Pulses: 2+ dorsalis pedis pulse. Cap refill < 2 sec.  Sensation: Grossly intact to light touch without deficit.  Motor: + EHL/FHL/TA/GS      Culture - Tissue with Gram Stain (collected 01 Sep 2022 08:59)  Source: .Tissue Right hip Bone Culture  Gram Stain (01 Sep 2022 21:57):    No polymorphonuclear leukocytes per low power field    No organisms seen per oil power field  Preliminary Report (02 Sep 2022 18:45):    Rare Pseudomonas aeruginosa    Culture - Tissue with Gram Stain (collected 01 Sep 2022 08:56)  Source: .Tissue Right Hip Culture 3  Gram Stain (01 Sep 2022 21:57):    No polymorphonuclear leukocytes per low power field    No organisms seen per oil power field  Preliminary Report (03 Sep 2022 00:42):    No growth    Culture - Tissue with Gram Stain (collected 01 Sep 2022 08:56)  Source: .Tissue Right Hip Culture 2  Gram Stain (01 Sep 2022 21:56):    No polymorphonuclear leukocytes per low power field    No organisms seen per oil power field  Preliminary Report (02 Sep 2022 18:40):    Rare Pseudomonas aeruginosa    Culture - Tissue with Gram Stain (collected 01 Sep 2022 08:55)  Source: .Tissue Right Hip Culture 1  Gram Stain (01 Sep 2022 21:58):    No polymorphonuclear leukocytes per low power field    No organisms seen per oil power field  Preliminary Report (02 Sep 2022 22:22):    Rare Pseudomonas aeruginosa    See previous culture [id number]    Culture - Tissue with Gram Stain (collected 01 Sep 2022 08:55)  Source: .Tissue Right Hip Tissue for C&amp;S  Gram Stain (01 Sep 2022 21:57):    Few polymorphonuclear leukocytes per low power field    No organisms seen per oil power field  Preliminary Report (02 Sep 2022 18:48):    No growth      A/P: 61yFemale POD#3 s/p right hip ORIF periprosthetic fx    - Stable  - Pain Control  - DVT ppx: lovenox  - Continue to follow OR Cultures until final (PRELIM Pseudomonas)  - ID following (abx as per ID)  - PT   - Weight bearing status: WBAT      Ortho Post Op Check  Name: SUSY CHOUDHURY  MR #: [id number]    Procedure: s/p right hip ORIF periprosthetic fx/nonunion pod #3  Surgeon: Charlene    Pt comfortable without complaints, pain controlled at the time.  Pt has been OOB to chair.  Denies CP, SOB, N/V, numbness/tingling     Vital Signs Last 24 Hrs  T(C): 36.9 (04 Sep 2022 04:11), Max: 37.2 (03 Sep 2022 17:16)  T(F): 98.4 (04 Sep 2022 04:11), Max: 98.9 (03 Sep 2022 17:16)  HR: 82 (04 Sep 2022 04:11) (80 - 91)  BP: 111/72 (04 Sep 2022 04:11) (107/66 - 118/73)  BP(mean): --  RR: 18 (04 Sep 2022 04:11) (18 - 19)  SpO2: 96% (04 Sep 2022 04:11) (92% - 96%)    Parameters below as of 04 Sep 2022 04:11  Patient On (Oxygen Delivery Method): room air                            8.4    6.07  )-----------( 284      ( 04 Sep 2022 05:53 )             26.1     09-04    138  |  106  |  13.4  ----------------------------<  100<H>  4.5   |  25.0  |  0.59    Ca    8.4      04 Sep 2022 05:53  Phos  2.7     09-04  Mg     2.1     09-04        General: Pt Alert and oriented, NAD, controlled pain.    Dressings C/D/I. No bleeding.  Pulses: 2+ dorsalis pedis pulse. Cap refill < 2 sec.  Sensation: Grossly intact to light touch without deficit.  Motor: + EHL/FHL/TA/GS      Culture - Tissue with Gram Stain (collected 01 Sep 2022 08:59)  Source: .Tissue Right hip Bone Culture  Gram Stain (01 Sep 2022 21:57):    No polymorphonuclear leukocytes per low power field    No organisms seen per oil power field  Preliminary Report (02 Sep 2022 18:45):    Rare Pseudomonas aeruginosa    Culture - Tissue with Gram Stain (collected 01 Sep 2022 08:56)  Source: .Tissue Right Hip Culture 3  Gram Stain (01 Sep 2022 21:57):    No polymorphonuclear leukocytes per low power field    No organisms seen per oil power field  Preliminary Report (03 Sep 2022 00:42):    No growth    Culture - Tissue with Gram Stain (collected 01 Sep 2022 08:56)  Source: .Tissue Right Hip Culture 2  Gram Stain (01 Sep 2022 21:56):    No polymorphonuclear leukocytes per low power field    No organisms seen per oil power field  Preliminary Report (02 Sep 2022 18:40):    Rare Pseudomonas aeruginosa    Culture - Tissue with Gram Stain (collected 01 Sep 2022 08:55)  Source: .Tissue Right Hip Culture 1  Gram Stain (01 Sep 2022 21:58):    No polymorphonuclear leukocytes per low power field    No organisms seen per oil power field  Preliminary Report (02 Sep 2022 22:22):    Rare Pseudomonas aeruginosa    See previous culture [id number]    Culture - Tissue with Gram Stain (collected 01 Sep 2022 08:55)  Source: .Tissue Right Hip Tissue for C&amp;S  Gram Stain (01 Sep 2022 21:57):    Few polymorphonuclear leukocytes per low power field    No organisms seen per oil power field  Preliminary Report (02 Sep 2022 18:48):    No growth      A/P: 61yFemale POD#3 s/p right hip ORIF periprosthetic fx    - Stable  - Pain Control  - DVT ppx: lovenox  - Continue to follow OR Cultures until final (PRELIM Pseudomonas)  - ID following (abx as per ID)  - PT   - GT precautions  - Weight bearing status: WBAT

## 2022-09-04 NOTE — PROGRESS NOTE ADULT - SUBJECTIVE AND OBJECTIVE BOX
HEALTH ISSUES - PROBLEM Dx:  Asthma, obesity, JOSE LUIS not on NIV, Migraines, HTN.    fibrous nonunion proximal femur s/p repair    INTERVAL HPI/ OVERNIGHT EVENTS:    lying in bed  comfortable  discussed plan of care  explained antibiotics and pending ID decisions    REVIEW OF SYSTEMS:    as above    Vital Signs Last 24 Hrs  T(C): 36.7 (04 Sep 2022 11:16), Max: 37.2 (03 Sep 2022 17:16)  T(F): 98.1 (04 Sep 2022 11:16), Max: 98.9 (03 Sep 2022 17:16)  HR: 82 (04 Sep 2022 11:16) (82 - 91)  BP: 127/77 (04 Sep 2022 11:16) (107/66 - 127/77)  BP(mean): --  RR: 18 (04 Sep 2022 11:16) (18 - 19)  SpO2: 97% (04 Sep 2022 11:16) (94% - 97%)    Parameters below as of 04 Sep 2022 11:16  Patient On (Oxygen Delivery Method): room air    PHYSICAL EXAM-  GENERAL: Comfortable, pain controlled  HEAD:  Atraumatic, Normocephalic  EYES: EOMI, PERRLA, conjunctiva and sclera clear  ENT: Moist mucous membranes, No lesions  NECK: Supple, No JVD, Normal thyroid  NERVOUS SYSTEM:  Alert & Oriented X 3, Moving tereza UE and left LE, right LE ankle full flexion and knee min flexion, Dressings C/D/I  CHEST/LUNG: CTA bilaterally; No rales, rhonchi, wheezing, or rubs  HEART: Regular rate and rhythm; No murmurs, rubs, or gallops  ABDOMEN: Soft, Nontender, Nondistended; Bowel sounds present  EXTREMITIES:  2+ Peripheral Pulses, No clubbing, cyanosis, or edema  SKIN: No rashes or lesions    MEDICATIONS  (STANDING):  acetaminophen     Tablet .. 975 milliGRAM(s) Oral every 8 hours  cefepime   IVPB 2000 milliGRAM(s) IV Intermittent every 8 hours  enoxaparin Injectable 40 milliGRAM(s) SubCutaneous every 24 hours  ferrous    sulfate 325 milliGRAM(s) Oral daily  multivitamin 1 Tablet(s) Oral daily  polyethylene glycol 3350 17 Gram(s) Oral at bedtime  senna 2 Tablet(s) Oral at bedtime  topiramate 50 milliGRAM(s) Oral daily    MEDICATIONS  (PRN):  artificial  tears Solution 1 Drop(s) Both EYES every 4 hours PRN Dry Eyes  hydrALAZINE Injectable 10 milliGRAM(s) IV Push every 6 hours PRN If SBP > 160  magnesium hydroxide Suspension 30 milliLiter(s) Oral daily PRN Constipation  ondansetron Injectable 4 milliGRAM(s) IV Push every 6 hours PRN Nausea and/or Vomiting  oxyCODONE    IR 5 milliGRAM(s) Oral every 3 hours PRN Mild Pain (1 - 3)  oxyCODONE    IR 10 milliGRAM(s) Oral every 3 hours PRN Moderate Pain (4 - 6)      LABS:                        8.4    6.07  )-----------( 284      ( 04 Sep 2022 05:53 )             26.1     09-04    138  |  106  |  13.4  ----------------------------<  100<H>  4.5   |  25.0  |  0.59    Ca    8.4      04 Sep 2022 05:53  Phos  2.7     09-04  Mg     2.1     09-04

## 2022-09-05 LAB
RBC # BLD: 2.85 M/UL — LOW (ref 3.8–5.2)
RETICS #: 66.1 K/UL — SIGNIFICANT CHANGE UP (ref 25–125)
RETICS/RBC NFR: 2.3 % — SIGNIFICANT CHANGE UP (ref 0.5–2.5)

## 2022-09-05 PROCEDURE — 99232 SBSQ HOSP IP/OBS MODERATE 35: CPT

## 2022-09-05 RX ORDER — ERYTHROPOIETIN 10000 [IU]/ML
40000 INJECTION, SOLUTION INTRAVENOUS; SUBCUTANEOUS ONCE
Refills: 0 | Status: COMPLETED | OUTPATIENT
Start: 2022-09-05 | End: 2022-09-05

## 2022-09-05 RX ADMIN — CEFEPIME 100 MILLIGRAM(S): 1 INJECTION, POWDER, FOR SOLUTION INTRAMUSCULAR; INTRAVENOUS at 15:16

## 2022-09-05 RX ADMIN — Medication 975 MILLIGRAM(S): at 05:20

## 2022-09-05 RX ADMIN — OXYCODONE HYDROCHLORIDE 5 MILLIGRAM(S): 5 TABLET ORAL at 20:01

## 2022-09-05 RX ADMIN — Medication 975 MILLIGRAM(S): at 22:30

## 2022-09-05 RX ADMIN — ONDANSETRON 4 MILLIGRAM(S): 8 TABLET, FILM COATED ORAL at 06:01

## 2022-09-05 RX ADMIN — CEFEPIME 100 MILLIGRAM(S): 1 INJECTION, POWDER, FOR SOLUTION INTRAMUSCULAR; INTRAVENOUS at 05:21

## 2022-09-05 RX ADMIN — ENOXAPARIN SODIUM 40 MILLIGRAM(S): 100 INJECTION SUBCUTANEOUS at 05:21

## 2022-09-05 RX ADMIN — Medication 975 MILLIGRAM(S): at 21:18

## 2022-09-05 RX ADMIN — ERYTHROPOIETIN 40000 UNIT(S): 10000 INJECTION, SOLUTION INTRAVENOUS; SUBCUTANEOUS at 17:48

## 2022-09-05 RX ADMIN — Medication 50 MILLIGRAM(S): at 11:56

## 2022-09-05 RX ADMIN — OXYCODONE HYDROCHLORIDE 5 MILLIGRAM(S): 5 TABLET ORAL at 11:52

## 2022-09-05 RX ADMIN — ONDANSETRON 4 MILLIGRAM(S): 8 TABLET, FILM COATED ORAL at 20:01

## 2022-09-05 RX ADMIN — OXYCODONE HYDROCHLORIDE 5 MILLIGRAM(S): 5 TABLET ORAL at 01:02

## 2022-09-05 RX ADMIN — Medication 975 MILLIGRAM(S): at 06:20

## 2022-09-05 RX ADMIN — OXYCODONE HYDROCHLORIDE 5 MILLIGRAM(S): 5 TABLET ORAL at 06:20

## 2022-09-05 RX ADMIN — POLYETHYLENE GLYCOL 3350 17 GRAM(S): 17 POWDER, FOR SOLUTION ORAL at 21:18

## 2022-09-05 RX ADMIN — OXYCODONE HYDROCHLORIDE 5 MILLIGRAM(S): 5 TABLET ORAL at 16:20

## 2022-09-05 RX ADMIN — OXYCODONE HYDROCHLORIDE 5 MILLIGRAM(S): 5 TABLET ORAL at 12:45

## 2022-09-05 RX ADMIN — OXYCODONE HYDROCHLORIDE 5 MILLIGRAM(S): 5 TABLET ORAL at 15:30

## 2022-09-05 RX ADMIN — Medication 975 MILLIGRAM(S): at 16:41

## 2022-09-05 RX ADMIN — CEFEPIME 100 MILLIGRAM(S): 1 INJECTION, POWDER, FOR SOLUTION INTRAMUSCULAR; INTRAVENOUS at 21:18

## 2022-09-05 RX ADMIN — Medication 1 TABLET(S): at 11:56

## 2022-09-05 RX ADMIN — OXYCODONE HYDROCHLORIDE 5 MILLIGRAM(S): 5 TABLET ORAL at 05:20

## 2022-09-05 RX ADMIN — OXYCODONE HYDROCHLORIDE 5 MILLIGRAM(S): 5 TABLET ORAL at 00:02

## 2022-09-05 RX ADMIN — Medication 325 MILLIGRAM(S): at 11:52

## 2022-09-05 RX ADMIN — ONDANSETRON 4 MILLIGRAM(S): 8 TABLET, FILM COATED ORAL at 12:02

## 2022-09-05 RX ADMIN — OXYCODONE HYDROCHLORIDE 5 MILLIGRAM(S): 5 TABLET ORAL at 20:50

## 2022-09-05 RX ADMIN — Medication 975 MILLIGRAM(S): at 15:16

## 2022-09-05 RX ADMIN — SENNA PLUS 2 TABLET(S): 8.6 TABLET ORAL at 21:18

## 2022-09-05 NOTE — PROGRESS NOTE ADULT - SUBJECTIVE AND OBJECTIVE BOX
HEALTH ISSUES - PROBLEM Dx:  Asthma, obesity, JOSE LUIS not on NIV, Migraines, HTN.    fibrous nonunion proximal femur s/p repair    INTERVAL HPI/ OVERNIGHT EVENTS:    lying in bed  comfortable  explained antibiotics and pending ID decisions    REVIEW OF SYSTEMS:    as above    Vital Signs Last 24 Hrs  T(C): 36.9 (05 Sep 2022 12:44), Max: 36.9 (05 Sep 2022 12:44)  T(F): 98.4 (05 Sep 2022 12:44), Max: 98.4 (05 Sep 2022 12:44)  HR: 88 (05 Sep 2022 12:44) (82 - 89)  BP: 106/70 (05 Sep 2022 12:44) (103/64 - 131/75)  BP(mean): --  RR: 18 (05 Sep 2022 12:44) (18 - 18)  SpO2: 94% (05 Sep 2022 12:44) (94% - 99%)    Parameters below as of 05 Sep 2022 12:44  Patient On (Oxygen Delivery Method): room air    PHYSICAL EXAM-  GENERAL: Comfortable, pain controlled  HEAD:  Atraumatic, Normocephalic  EYES: EOMI, PERRLA, conjunctiva and sclera clear  ENT: Moist mucous membranes, No lesions  NECK: Supple, No JVD, Normal thyroid  NERVOUS SYSTEM:  Alert & Oriented X 3, Moving tereza UE and left LE, right LE ankle full flexion and knee min flexion, Dressings C/D/I  CHEST/LUNG: CTA bilaterally; No rales, rhonchi, wheezing, or rubs  HEART: Regular rate and rhythm; No murmurs, rubs, or gallops  ABDOMEN: Soft, Nontender, Nondistended; Bowel sounds present  EXTREMITIES:  2+ Peripheral Pulses, No clubbing, cyanosis, or edema  SKIN: No rashes or lesions      MEDICATIONS  (STANDING):  acetaminophen     Tablet .. 975 milliGRAM(s) Oral every 8 hours  cefepime   IVPB 2000 milliGRAM(s) IV Intermittent every 8 hours  enoxaparin Injectable 40 milliGRAM(s) SubCutaneous every 24 hours  epoetin cornelius-epbx (RETACRIT) Injectable 82046 Unit(s) SubCutaneous once  ferrous    sulfate 325 milliGRAM(s) Oral daily  multivitamin 1 Tablet(s) Oral daily  polyethylene glycol 3350 17 Gram(s) Oral at bedtime  senna 2 Tablet(s) Oral at bedtime  topiramate 50 milliGRAM(s) Oral daily    MEDICATIONS  (PRN):  artificial  tears Solution 1 Drop(s) Both EYES every 4 hours PRN Dry Eyes  hydrALAZINE Injectable 10 milliGRAM(s) IV Push every 6 hours PRN If SBP > 160  magnesium hydroxide Suspension 30 milliLiter(s) Oral daily PRN Constipation  ondansetron Injectable 4 milliGRAM(s) IV Push every 6 hours PRN Nausea and/or Vomiting  oxyCODONE    IR 5 milliGRAM(s) Oral every 3 hours PRN Mild Pain (1 - 3)  oxyCODONE    IR 10 milliGRAM(s) Oral every 3 hours PRN Moderate Pain (4 - 6)      LABS:                        8.4    6.07  )-----------( 284      ( 04 Sep 2022 05:53 )             26.1     09-04    138  |  106  |  13.4  ----------------------------<  100<H>  4.5   |  25.0  |  0.59    Ca    8.4      04 Sep 2022 05:53  Phos  2.7     09-04  Mg     2.1     09-04

## 2022-09-05 NOTE — PROGRESS NOTE ADULT - SUBJECTIVE AND OBJECTIVE BOX
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  Pt has been OOB to chair. Denies numbness/tingling. Denies fever/chills.  ICU Vital Signs Last 24 Hrs  T(C): 36.8 (05 Sep 2022 04:09), Max: 36.8 (04 Sep 2022 19:58)  T(F): 98.2 (05 Sep 2022 04:09), Max: 98.3 (04 Sep 2022 19:58)  HR: 89 (05 Sep 2022 04:09) (82 - 89)  BP: 128/85 (05 Sep 2022 04:09) (103/64 - 131/75)  BP(mean): --  ABP: --  ABP(mean): --  RR: 18 (05 Sep 2022 04:09) (18 - 18)  SpO2: 99% (05 Sep 2022 04:09) (95% - 99%)    O2 Parameters below as of 05 Sep 2022 04:09  Patient On (Oxygen Delivery Method): room air                              8.4    6.07  )-----------( 284      ( 04 Sep 2022 05:53 )             26.1       General: Pt Alert and oriented, NAD, controlled pain.    Dressings C/D/I. No bleeding.  Pulses: 2+ dorsalis pedis pulse. Cap refill < 2 sec.  Sensation: Grossly intact to light touch without deficit.  Motor: + EHL/FHL/TA/GS    Culture - Tissue with Gram Stain (09.01.22 @ 08:59)    -  Tobramycin: S <=2    -  Cefepime: S <=2    -  Ceftazidime: S <=1    -  Ciprofloxacin: S <=0.25    -  Gentamicin: S <=2    -  Imipenem: S <=1    -  Levofloxacin: S <=0.5    -  Meropenem: S <=1    -  Piperacillin/Tazobactam: S <=8    Gram Stain:   No polymorphonuclear leukocytes per low power field  No organisms seen per oil power field    -  Amikacin: S <=16    -  Aztreonam: S <=4    Specimen Source: .Tissue Right hip Bone Culture    Culture Results:   Rare Pseudomonas aeruginosa    Organism Identification: Pseudomonas aeruginosa    Organism: Pseudomonas aeruginosa    Method Type: PB    A/P: 61yFemale POD#4 s/p right hip ORIF periprosthetic fx    - Stable  - Pain Control  - DVT ppx: lovenox  - f/u OR Cultures until final  - cx growing Pseudomonas  - ID following (abx as per ID)  - PT   - GT precautions  - Weight bearing status: WBAT

## 2022-09-05 NOTE — PROGRESS NOTE ADULT - SUBJECTIVE AND OBJECTIVE BOX
[facility]  INFECTIOUS DISEASES at [place] and [place]  =======================================================                               Curt Gallegos MD#  Jose Reyna MD*                                     Tanja Oliva MD*    Radha Stevens MD*            Diplomates American Board of Internal Medicine & Infectious Diseases                # [place] Office - Appt - Tel  [phone] Fax [phone]                * [place] Office - Appt - Tel [phone] Fax [phone]                                  Hospital Consult line:  [phone]  =======================================================    SUSY CHOUDHURY [id number]    Follow up: Surgical site infection with pseudomonas     No fever or chills  No complaints       Allergies:  No Known Allergies  opioid-like analgesics (Vomiting; Nausea)       REVIEW OF SYSTEMS:  CONSTITUTIONAL:  No Fever or chills  HEENT:  No diplopia or blurred vision.  No earache, sore throat or runny nose.  CARDIOVASCULAR:  No chest pain  RESPIRATORY:  No cough, shortness of breath  GASTROINTESTINAL:  No nausea, vomiting or diarrhea.  GENITOURINARY:  No dysuria, frequency or urgency. No Blood in urine  MUSCULOSKELETAL:  no joint aches, no muscle pain  SKIN:  No change in skin, hair or nails.  NEUROLOGIC:  No Headaches, seizures  PSYCHIATRIC:  No disorder of thought or mood.  ENDOCRINE:  No heat or cold intolerance  HEMATOLOGICAL:  No easy bruising or bleeding.       Physical Exam:  GEN: NAD  HEENT: normocephalic and atraumatic. EOMI. PERRL.    NECK: Supple.   LUNGS: CTA B/L.  HEART: RRR  ABDOMEN: Soft, NT, ND.  +BS.    : No CVA tenderness  EXTREMITIES: Without  edema.  MSK: No joint swelling  NEUROLOGIC: No Focal Deficits   PSYCHIATRIC: Appropriate affect .  SKIN: Rt Hip with dressing       Vitals:  T(F): 98.2 (05 Sep 2022 04:09), Max: 98.3 (04 Sep 2022 19:58)  HR: 89 (05 Sep 2022 04:09)  BP: 128/85 (05 Sep 2022 04:09)  RR: 18 (05 Sep 2022 04:09)  SpO2: 99% (05 Sep 2022 04:09) (95% - 99%)  temp max in last 48H T(F): , Max: 98.9 (09-03-22 @ 17:16)      Current Antibiotics:  cefepime   IVPB 2000 milliGRAM(s) IV Intermittent every 8 hours    Other medications:  acetaminophen     Tablet .. 975 milliGRAM(s) Oral every 8 hours  enoxaparin Injectable 40 milliGRAM(s) SubCutaneous every 24 hours  epoetin cornelius-epbx (RETACRIT) Injectable 62139 Unit(s) SubCutaneous once  ferrous    sulfate 325 milliGRAM(s) Oral daily  multivitamin 1 Tablet(s) Oral daily  polyethylene glycol 3350 17 Gram(s) Oral at bedtime  senna 2 Tablet(s) Oral at bedtime  topiramate 50 milliGRAM(s) Oral daily                            8.4    6.07  )-----------( 284      ( 04 Sep 2022 05:53 )             26.1     09-04    138  |  106  |  13.4  ----------------------------<  100<H>  4.5   |  25.0  |  0.59    Ca    8.4      04 Sep 2022 05:53  Phos  2.7     09-04  Mg     2.1     09-04      RECENT CULTURES:  09-03 @ 20:10 .Blood Blood-Peripheral     No growth to date.    09-03 @ 20:05 .Blood Blood-Peripheral     No growth to date.    09-01 @ 08:59 .Tissue Right hip Bone Culture Pseudomonas aeruginosa    Rare Pseudomonas aeruginosa  No polymorphonuclear leukocytes per low power field  No organisms seen per oil power field    09-01 @ 08:56 .Tissue Right Hip Culture 2 Pseudomonas aeruginosa    Rare Pseudomonas aeruginosa  No polymorphonuclear leukocytes per low power field  No organisms seen per oil power field    09-01 @ 08:55 .Tissue Right Hip Tissue for C&S     No growth  Few polymorphonuclear leukocytes per low power field  No organisms seen per oil power field      WBC Count: 6.07 K/uL (09-04-22 @ 05:53)  WBC Count: 5.45 K/uL (09-03-22 @ 05:36)  WBC Count: 6.52 K/uL (09-02-22 @ 08:45)  WBC Count: 6.51 K/uL (09-02-22 @ 03:40)  WBC Count: 13.60 K/uL (09-01-22 @ 12:50)    Creatinine, Serum: 0.59 mg/dL (09-04-22 @ 05:53)  Creatinine, Serum: 0.51 mg/dL (09-03-22 @ 05:36)  Creatinine, Serum: 0.61 mg/dL (09-02-22 @ 03:40)    COVID-19 PCR: NotDetec (08-30-22 @ 15:53)  COVID-19 PCR: NotDetec (08-27-22 @ 15:18)

## 2022-09-06 ENCOUNTER — NON-APPOINTMENT (OUTPATIENT)
Age: 62
End: 2022-09-06

## 2022-09-06 LAB — SARS-COV-2 RNA SPEC QL NAA+PROBE: SIGNIFICANT CHANGE UP

## 2022-09-06 PROCEDURE — 99233 SBSQ HOSP IP/OBS HIGH 50: CPT

## 2022-09-06 PROCEDURE — 71045 X-RAY EXAM CHEST 1 VIEW: CPT | Mod: 26

## 2022-09-06 PROCEDURE — 76942 ECHO GUIDE FOR BIOPSY: CPT | Mod: 26,59

## 2022-09-06 PROCEDURE — 76937 US GUIDE VASCULAR ACCESS: CPT | Mod: 26,59

## 2022-09-06 PROCEDURE — 36573 INSJ PICC RS&I 5 YR+: CPT

## 2022-09-06 RX ORDER — OXYCODONE HYDROCHLORIDE 5 MG/1
1 TABLET ORAL
Qty: 0 | Refills: 0 | DISCHARGE
Start: 2022-09-06

## 2022-09-06 RX ORDER — FERROUS SULFATE 325(65) MG
1 TABLET ORAL
Qty: 0 | Refills: 0 | DISCHARGE
Start: 2022-09-06

## 2022-09-06 RX ORDER — ENOXAPARIN SODIUM 100 MG/ML
40 INJECTION SUBCUTANEOUS
Qty: 0 | Refills: 0 | DISCHARGE
Start: 2022-09-06

## 2022-09-06 RX ORDER — SODIUM CHLORIDE 9 MG/ML
10 INJECTION INTRAMUSCULAR; INTRAVENOUS; SUBCUTANEOUS
Refills: 0 | Status: DISCONTINUED | OUTPATIENT
Start: 2022-09-06 | End: 2022-09-07

## 2022-09-06 RX ORDER — CEFEPIME 1 G/1
2 INJECTION, POWDER, FOR SOLUTION INTRAMUSCULAR; INTRAVENOUS
Qty: 0 | Refills: 0 | DISCHARGE
Start: 2022-09-06

## 2022-09-06 RX ORDER — CHLORHEXIDINE GLUCONATE 213 G/1000ML
1 SOLUTION TOPICAL
Refills: 0 | Status: DISCONTINUED | OUTPATIENT
Start: 2022-09-06 | End: 2022-09-07

## 2022-09-06 RX ADMIN — OXYCODONE HYDROCHLORIDE 5 MILLIGRAM(S): 5 TABLET ORAL at 20:30

## 2022-09-06 RX ADMIN — Medication 975 MILLIGRAM(S): at 05:40

## 2022-09-06 RX ADMIN — OXYCODONE HYDROCHLORIDE 5 MILLIGRAM(S): 5 TABLET ORAL at 05:41

## 2022-09-06 RX ADMIN — CEFEPIME 100 MILLIGRAM(S): 1 INJECTION, POWDER, FOR SOLUTION INTRAMUSCULAR; INTRAVENOUS at 05:42

## 2022-09-06 RX ADMIN — Medication 1 TABLET(S): at 11:50

## 2022-09-06 RX ADMIN — OXYCODONE HYDROCHLORIDE 5 MILLIGRAM(S): 5 TABLET ORAL at 00:16

## 2022-09-06 RX ADMIN — ONDANSETRON 4 MILLIGRAM(S): 8 TABLET, FILM COATED ORAL at 11:50

## 2022-09-06 RX ADMIN — OXYCODONE HYDROCHLORIDE 10 MILLIGRAM(S): 5 TABLET ORAL at 15:30

## 2022-09-06 RX ADMIN — CEFEPIME 100 MILLIGRAM(S): 1 INJECTION, POWDER, FOR SOLUTION INTRAMUSCULAR; INTRAVENOUS at 21:46

## 2022-09-06 RX ADMIN — Medication 975 MILLIGRAM(S): at 14:28

## 2022-09-06 RX ADMIN — Medication 975 MILLIGRAM(S): at 13:23

## 2022-09-06 RX ADMIN — Medication 325 MILLIGRAM(S): at 11:50

## 2022-09-06 RX ADMIN — POLYETHYLENE GLYCOL 3350 17 GRAM(S): 17 POWDER, FOR SOLUTION ORAL at 21:47

## 2022-09-06 RX ADMIN — ONDANSETRON 4 MILLIGRAM(S): 8 TABLET, FILM COATED ORAL at 05:41

## 2022-09-06 RX ADMIN — ENOXAPARIN SODIUM 40 MILLIGRAM(S): 100 INJECTION SUBCUTANEOUS at 05:41

## 2022-09-06 RX ADMIN — CEFEPIME 100 MILLIGRAM(S): 1 INJECTION, POWDER, FOR SOLUTION INTRAMUSCULAR; INTRAVENOUS at 13:26

## 2022-09-06 RX ADMIN — OXYCODONE HYDROCHLORIDE 10 MILLIGRAM(S): 5 TABLET ORAL at 14:40

## 2022-09-06 RX ADMIN — Medication 975 MILLIGRAM(S): at 21:46

## 2022-09-06 RX ADMIN — OXYCODONE HYDROCHLORIDE 5 MILLIGRAM(S): 5 TABLET ORAL at 11:37

## 2022-09-06 RX ADMIN — Medication 975 MILLIGRAM(S): at 22:30

## 2022-09-06 RX ADMIN — ONDANSETRON 4 MILLIGRAM(S): 8 TABLET, FILM COATED ORAL at 19:57

## 2022-09-06 RX ADMIN — OXYCODONE HYDROCHLORIDE 10 MILLIGRAM(S): 5 TABLET ORAL at 23:03

## 2022-09-06 RX ADMIN — Medication 50 MILLIGRAM(S): at 11:50

## 2022-09-06 RX ADMIN — OXYCODONE HYDROCHLORIDE 5 MILLIGRAM(S): 5 TABLET ORAL at 06:30

## 2022-09-06 RX ADMIN — OXYCODONE HYDROCHLORIDE 5 MILLIGRAM(S): 5 TABLET ORAL at 19:57

## 2022-09-06 RX ADMIN — OXYCODONE HYDROCHLORIDE 5 MILLIGRAM(S): 5 TABLET ORAL at 01:00

## 2022-09-06 RX ADMIN — SENNA PLUS 2 TABLET(S): 8.6 TABLET ORAL at 21:47

## 2022-09-06 RX ADMIN — OXYCODONE HYDROCHLORIDE 5 MILLIGRAM(S): 5 TABLET ORAL at 12:35

## 2022-09-06 RX ADMIN — Medication 975 MILLIGRAM(S): at 07:03

## 2022-09-06 RX ADMIN — CHLORHEXIDINE GLUCONATE 1 APPLICATION(S): 213 SOLUTION TOPICAL at 10:28

## 2022-09-06 NOTE — PROGRESS NOTE ADULT - SUBJECTIVE AND OBJECTIVE BOX
Pt Name: SUSY CHOUDHURY    MRN: [id number]    Patient is a being followed for right greater troch ORIF POD#5. Patient has been ambulating with walker, pain is well controlled. Patient denies motor sensory changes. Denies fever/chills. Patient states she is awaiting update for antibiotic recommendations regarding culture results.     PAST MEDICAL & SURGICAL HISTORY:  PAST MEDICAL & SURGICAL HISTORY:  Failure of right total hip arthroplasty, initial encounter    Hypertension      OA (osteoarthritis)      Broken internal right hip prosthesis, initial encounter      JOSE LUIS (obstructive sleep apnea)      Mild asthma      History of bunionectomy  right foot       S/P excision of lipoma  [id number]      History of total right hip replacement  2017 at [facility]      H/O  section            Allergies: No Known Allergies  opioid-like analgesics (Vomiting; Nausea)      Medications: acetaminophen     Tablet .. 975 milliGRAM(s) Oral every 8 hours  artificial  tears Solution 1 Drop(s) Both EYES every 4 hours PRN  cefepime   IVPB 2000 milliGRAM(s) IV Intermittent every 8 hours  enoxaparin Injectable 40 milliGRAM(s) SubCutaneous every 24 hours  ferrous    sulfate 325 milliGRAM(s) Oral daily  hydrALAZINE Injectable 10 milliGRAM(s) IV Push every 6 hours PRN  magnesium hydroxide Suspension 30 milliLiter(s) Oral daily PRN  multivitamin 1 Tablet(s) Oral daily  ondansetron Injectable 4 milliGRAM(s) IV Push every 6 hours PRN  oxyCODONE    IR 5 milliGRAM(s) Oral every 3 hours PRN  oxyCODONE    IR 10 milliGRAM(s) Oral every 3 hours PRN  polyethylene glycol 3350 17 Gram(s) Oral at bedtime  senna 2 Tablet(s) Oral at bedtime  topiramate 50 milliGRAM(s) Oral daily    PHYSICAL EXAM:    Vital Signs Last 24 Hrs  T(C): 36.4 (06 Sep 2022 03:46), Max: 36.9 (05 Sep 2022 12:44)  T(F): 97.6 (06 Sep 2022 03:46), Max: 98.4 (05 Sep 2022 12:44)  HR: 86 (06 Sep 2022 03:46) (85 - 88)  BP: 113/72 (06 Sep 2022 03:46) (106/70 - 120/77)  BP(mean): --  RR: 18 (06 Sep 2022 03:46) (18 - 19)  SpO2: 94% (06 Sep 2022 03:46) (93% - 94%)    Parameters below as of 06 Sep 2022 03:46  Patient On (Oxygen Delivery Method): room air      Daily     Daily     Appearance: Alert, responsive, in no acute distress.  RLE: right hip/thigh mepilex c/d/i with small area of serous staining at middle of dressing, no erythema to surrounding skin, thigh soft and compressible, +PF/DF/EHL/FHL, DP intact, SILT, BCR, no cyanosis    Culture - Tissue with Gram Stain (22 @ 08:55)    Gram Stain:   Few polymorphonuclear leukocytes per low power field  No organisms seen per oil power field    Specimen Source: .Tissue Right Hip Tissue for C&S    Culture Results:   No growth    Culture - Tissue with Gram Stain (22 @ 08:55)    Gram Stain:   No polymorphonuclear leukocytes per low power field  No organisms seen per oil power field    Specimen Source: .Tissue Right Hip Culture 1    Culture Results:   Rare Pseudomonas aeruginosa  See previous culture [id number]    Culture - Tissue with Gram Stain (22 @ 08:56)    -  Amikacin: S <=16    -  Piperacillin/Tazobactam: S <=8    -  Tobramycin: S <=2    Gram Stain:   No polymorphonuclear leukocytes per low power field  No organisms seen per oil power field    -  Levofloxacin: S <=0.5    -  Meropenem: S <=1    -  Gentamicin: S <=2    -  Imipenem: S <=1    -  Ceftazidime: S 8    -  Ciprofloxacin: S <=0.25    -  Aztreonam: S <=4    -  Cefepime: S <=2    Specimen Source: .Tissue Right Hip Culture 2    Culture Results:   Rare Pseudomonas aeruginosa    Organism Identification: Pseudomonas aeruginosa    Organism: Pseudomonas aeruginosa    Method Type: PB    Culture - Tissue with Gram Stain (22 @ 08:56)    Gram Stain:   No polymorphonuclear leukocytes per low power field  No organisms seen per oil power field    Specimen Source: .Tissue Right Hip Culture 3    Culture Results:   Rare Pseudomonas aeruginosa  See previous culture [id number]    Culture - Tissue with Gram Stain (22 @ 08:59)    -  Meropenem: S <=1    -  Piperacillin/Tazobactam: S <=8    -  Tobramycin: S <=2    Gram Stain:   No polymorphonuclear leukocytes per low power field  No organisms seen per oil power field    -  Amikacin: S <=16    -  Aztreonam: S <=4    -  Cefepime: S <=2    -  Ceftazidime: S <=1    -  Ciprofloxacin: S <=0.25    -  Gentamicin: S <=2    -  Imipenem: S <=1    -  Levofloxacin: S <=0.5    Specimen Source: .Tissue Right hip Bone Culture    Culture Results:   Rare Pseudomonas aeruginosa    Organism Identification: Pseudomonas aeruginosa    Organism: Pseudomonas aeruginosa    Method Type: PB    A/P:  Pt is a  61y Female with right greater troch ORIF POD#5 s/p extended trochanteric osteotomy non-union for THR    PLAN:   * WBAT w greater troch precautions  * DVTP  * Pain Control  * PT/OT  * ABX per ID  * cont. care per medicine

## 2022-09-06 NOTE — PROGRESS NOTE ADULT - SUBJECTIVE AND OBJECTIVE BOX
[facility]  INFECTIOUS DISEASES at [place] and [place]  =======================================================                               Curt Gallegos MD#  Jose Reyna MD*                                     Tanja Oliva MD*    Radha Stevens MD*            Diplomates American Board of Internal Medicine & Infectious Diseases                # [place] Office - Appt - Tel  [phone] Fax [phone]                * [place] Office - Appt - Tel [phone] Fax [phone]                                  Hospital Consult line:  [phone]  =======================================================    SUSY CHOUDHURY [id number]    Follow up: Surgical site infection with pseudomonas     No fever or chills  No complaints       Allergies:  No Known Allergies  opioid-like analgesics (Vomiting; Nausea)       REVIEW OF SYSTEMS:  CONSTITUTIONAL:  No Fever or chills  HEENT:  No diplopia or blurred vision.  No earache, sore throat or runny nose.  CARDIOVASCULAR:  No chest pain  RESPIRATORY:  No cough, shortness of breath  GASTROINTESTINAL:  No nausea, vomiting or diarrhea.  GENITOURINARY:  No dysuria, frequency or urgency. No Blood in urine  MUSCULOSKELETAL:  no joint aches, no muscle pain  SKIN:  No change in skin, hair or nails.  NEUROLOGIC:  No Headaches, seizures  PSYCHIATRIC:  No disorder of thought or mood.  ENDOCRINE:  No heat or cold intolerance  HEMATOLOGICAL:  No easy bruising or bleeding.       Physical Exam:  GEN: NAD  HEENT: normocephalic and atraumatic. EOMI. PERRL.    NECK: Supple.   LUNGS: CTA B/L.  HEART: RRR  ABDOMEN: Soft, NT, ND.  +BS.    : No CVA tenderness  EXTREMITIES: Without  edema.  MSK: No joint swelling  NEUROLOGIC: No Focal Deficits   PSYCHIATRIC: Appropriate affect .  SKIN: Rt Hip with dressing       Vitals:  T(F): 97.6 (06 Sep 2022 03:46), Max: 98.2 (05 Sep 2022 16:29)  HR: 86 (06 Sep 2022 03:46)  BP: 113/72 (06 Sep 2022 03:46)  RR: 18 (06 Sep 2022 03:46)  SpO2: 94% (06 Sep 2022 03:46) (93% - 94%)  temp max in last 48H T(F): , Max: 98.4 (09-05-22 @ 12:44)      Current Antibiotics:  cefepime   IVPB 2000 milliGRAM(s) IV Intermittent every 8 hours    Other medications:  acetaminophen     Tablet .. 975 milliGRAM(s) Oral every 8 hours  chlorhexidine 2% Cloths 1 Application(s) Topical <User Schedule>  enoxaparin Injectable 40 milliGRAM(s) SubCutaneous every 24 hours  ferrous    sulfate 325 milliGRAM(s) Oral daily  multivitamin 1 Tablet(s) Oral daily  polyethylene glycol 3350 17 Gram(s) Oral at bedtime  senna 2 Tablet(s) Oral at bedtime  topiramate 50 milliGRAM(s) Oral daily      RECENT CULTURES:  09-03 @ 20:10 .Blood Blood-Peripheral     No growth to date.    09-03 @ 20:05 .Blood Blood-Peripheral     No growth to date.    09-01 @ 08:59 .Tissue Right hip Bone Culture Pseudomonas aeruginosa    Rare Pseudomonas aeruginosa  No polymorphonuclear leukocytes per low power field  No organisms seen per oil power field    09-01 @ 08:56 .Tissue Right Hip Culture 2 Pseudomonas aeruginosa    Rare Pseudomonas aeruginosa  No polymorphonuclear leukocytes per low power field  No organisms seen per oil power field    09-01 @ 08:55 .Tissue Right Hip Tissue for C&S     No growth  Few polymorphonuclear leukocytes per low power field  No organisms seen per oil power field      WBC Count: 6.07 K/uL (09-04-22 @ 05:53)  WBC Count: 5.45 K/uL (09-03-22 @ 05:36)  WBC Count: 6.52 K/uL (09-02-22 @ 08:45)  WBC Count: 6.51 K/uL (09-02-22 @ 03:40)    Creatinine, Serum: 0.59 mg/dL (09-04-22 @ 05:53)  Creatinine, Serum: 0.51 mg/dL (09-03-22 @ 05:36)  Creatinine, Serum: 0.61 mg/dL (09-02-22 @ 03:40)     COVID-19 PCR: NotDetec (09-05-22 @ 18:00)  COVID-19 PCR: NotDetec (08-30-22 @ 15:53)  COVID-19 PCR: NotDetec (08-27-22 @ 15:18)

## 2022-09-06 NOTE — PROGRESS NOTE ADULT - REASON FOR ADMISSION
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

## 2022-09-06 NOTE — PROGRESS NOTE ADULT - PROVIDER SPECIALTY LIST ADULT
Hospitalist
Orthopedics
Infectious Disease
Orthopedics
Hospitalist
Infectious Disease
Internal Medicine
Internal Medicine
Orthopedics
Internal Medicine
Internal Medicine
Hospitalist

## 2022-09-06 NOTE — PROGRESS NOTE ADULT - ASSESSMENT
61y  Female with h/o Asthma, obesity, JOSE LUIS not on NIV, Migraines, HTN. Patient is s/p right THR (2017 at [facility] ) then found to have a fractured femoral prosthesis, revision performed through an extended trochanteric osteotomy 5/2/22. Patient  now  presented to [facility] s/p fall found to have a possible extended trochanteric osteotomy non union and was transferred to [facility] for evaluation of previous right THR on 5/2/22. She denies any LOC or head trauma from fall, describes fall as leg giving out. She was seen by Cardiology a [facility] prior to transfer and deemed stable for surgery. Denies any tori-operative issues in 5/22 during last revision. Here patient is s/p RT GT open reduction internal fixation 9/1/22. Post op patient was on Cefazolin, OR cultures with Pseudomonas.      Surgical site infection/prosthetic hip infection  with pseudomonas   s/p RT GT open reduction internal fixation 9/1/22  s/p Fall   extended trochanteric osteotomy non union       - Blood cultures 9/3 no growth   - OR cultures Pseudomonas aeruginosa  - XR Rt Hip post op fixation   - Continue Cefepime 2gm IV q 8hours    - ? If concern for prosthetic hip infection   - Follow up cultures  - Trend Fever  - Trend WBC      Will Follow    d/w Orthopedics   
62 y/o female medical history sig for Asthma, obesity, JOSE LUIS not on NIV, Migraines, HTN who was accepted for transfer from [facility] by Dr. Byrnes for evaluation of previous right THR on 5/2/22. Patient was admitted to [facility] on 8/27 s/p fall and found to have fractured femoral prosthesis/ non union and may need bone graft. She denies any LOC or head trauma from fall, describes fall as leg giving out. She was seen by Cardiology prior to transfer and deemed stable for surgery. Denies any tori-operative issues in 5/22 during last revision.     1) Right Hip Osteotomy Nonunion -s/p right hip ORIF periprosthetic fx 9/1/2022  -Pain control, Incentive spirometer  -WBAT  -PT/ OT  -Ortho following   -OR Cultures PRELIM rare Pseudomonas  -ID consulted- Maxipime started  -antibiotics through 10/28/22 total 8 weeks. will need suppression thereafter    2) ABLA post operative  - 1 PRBC 9/3/2022  - monitor H/H  - PO Iron  - EPO 40 K x 1    3) Epistaxis  -Resolved  -No prior hx of epistaxis  -s/p saline nasal spray    4) HTN  -DASH diet  -Hold Losartan and HCTZ due to soft BP. can resume when more active at [facility]  -Post op was given IVF and was started on Phenylephedrine and later weaned off  -- SICU consult appreciated postoperatively for hypotension.     5) Migraine  -Cont. Topamax    6) JOSE LUIS  -Not on NIV HS  -O/P pulm f/u    7) Asthma  -PRN nebs    8) Hypocalcemia  -Repleted    DVT Prophylaxis -- Lovenox SQ.     Dispo: [facility] (needs auth). PICC placed today  
61y  Female with h/o Asthma, obesity, JOSE LUIS not on NIV, Migraines, HTN. Patient is s/p right THR (2017 at [facility] ) then found to have a fractured femoral prosthesis, revision performed through an extended trochanteric osteotomy 5/2/22. Patient  now  presented to [facility] s/p fall found to have a possible extended trochanteric osteotomy non union and was transferred to [facility] for evaluation of previous right THR on 5/2/22. She denies any LOC or head trauma from fall, describes fall as leg giving out. She was seen by Cardiology a [facility] prior to transfer and deemed stable for surgery. Denies any tori-operative issues in 5/22 during last revision. Here patient is s/p RT GT open reduction internal fixation 9/1/22. Post op patient was on Cefazolin, OR cultures with Pseudomonas.      Surgical site infection/prosthetic hip infection  with pseudomonas   s/p RT GT open reduction internal fixation 9/1/22  s/p Fall   extended trochanteric osteotomy non union       - Blood cultures 9/3 no growth   - OR cultures Pseudomonas aeruginosa  - XR Rt Hip post op fixation   - Continue Cefepime 2gm IV q 8hours    - D/w Dr Chang, he is concerned for prosthetic hip infection   - Plan for PICC  - Will need Cefepime 2gm IV q 8hours till 10/28/22 (8 weeks) and she will likely need oral suppression after completion of IV antibiotics.    - Appointment with us in 7 to 10 days - [phone]  - Follow up cultures  - Trend Fever  - Trend WBC      Will Follow    d/w Dr Chang  
62 y/o female medical history sig for Asthma, obesity, JOSE LUIS not on NIV, Migraines, HTN who was accepted for transfer from [facility] by Dr. Byrnes for evaluation of previous right THR on 5/2/22. Patient was admitted to [facility] on 8/27 s/p fall and found to have fractured femoral prosthesis/ non union and may need bone graft. She denies any LOC or head trauma from fall, describes fall as leg giving out. She was seen by Cardiology prior to transfer and deemed stable for surgery. Denies any tori-operative issues in 5/22 during last revision.     1) Right Hip Osteotomy Nonunion -s/p right hip ORIF periprosthetic fx 9/1/2022  -Pain control, Incentive spirometer  -WBAT  -PT/ OT  -Ortho following   -OR Cultures PRELIM rare Pseudomonas  -ID consulted- Maxipime started    2) ABLA post operative  - 1 PRBC today  - monitor H/H  - PO Iron    3) Epistaxis  -Resolved  -No prior hx of epistaxis  -s/p saline nasal spray    4) HTN  -DASH diet  -Hold Losartan and HCTZ due to soft BP   -Post op was given IVF and was started on Phenylephedrine and later weaned off  -- SICU consult appreciated postoperatively for hypotension.     5) Migraine  -Cont. Topamax    6) JOSE LUIS  -Not on NIV HS  -O/P pulm f/u    7) Asthma  -PRN nebs    8) Hypocalcemia  -Repleted    DVT Prophylaxis -- Lovenox SQ.     Dispo: [facility] likely on Tuesday (needs auth).   
62 y/o female medical history sig for Asthma, obesity, JOSE LUIS not on NIV, Migraines, HTN who was accepted for transfer from [facility] by Dr. Byrnes for evaluation of previous right THR on 5/2/22. Patient was admitted to [facility] on 8/27 s/p fall and found to have fractured femoral prosthesis/ non union and may need bone graft. She denies any LOC or head trauma from fall, describes fall as leg giving out. She was seen by Cardiology prior to transfer and deemed stable for surgery. Denies any tori-operative issues in 5/22 during last revision.     1) Right Hip Osteotomy Nonunion -s/p right hip ORIF periprosthetic fx 9/1/2022  -Pain control, Incentive spirometer  -WBAT  -PT/ OT  -Ortho following   -OR Cultures PRELIM rare Pseudomonas  -ID consulted- Maxipime started    2) ABLA post operative  - 1 PRBC 9/3/2022  - monitor H/H  - PO Iron  - EPO 40 K x 1 today    3) Epistaxis  -Resolved  -No prior hx of epistaxis  -s/p saline nasal spray    4) HTN  -DASH diet  -Hold Losartan and HCTZ due to soft BP. can resume when more active at [facility]  -Post op was given IVF and was started on Phenylephedrine and later weaned off  -- SICU consult appreciated postoperatively for hypotension.     5) Migraine  -Cont. Topamax    6) JOSE LUIS  -Not on NIV HS  -O/P pulm f/u    7) Asthma  -PRN nebs    8) Hypocalcemia  -Repleted    DVT Prophylaxis -- Lovenox SQ.     Dispo: [facility] likely on Tuesday (needs auth). ID decisions pending  COVID in AM
62 y/o female who was accepted for transfer from [facility] by Dr. Byrnes for evaluation of previous right THR on 5/2/22. Patient was admitted to [facility] on 8/27 s/p fall and found to have fractured femoral prosthesis/non union and may need bone graft. She denies any LOC or head trauma from fall, describes fall as leg giving out. She was seen by Cardiology prior to transfer and deemed stable for surgery. Denies any tori-operative issues in 5/22 during last revision. Other medical history sig for Asthma, obesity, JOSE LUIS not on NIV, Migraines, HTN.     1) Right Hip Osteotomy Nonunion:  -s/p Repair Nonunion/malunion today   -Pain control, Incentive spirometer  -WBAT  -PT/OT  -Ortho following     2) Epistaxis:  -Resolved  -No prior hx of epistaxis  -Continue saline nasal spray for likely dry nasal passages    3) HTN:  -DASH diet  -Hold Losartan and HCTZ due to soft BP   -Post op was given IVF and was started on Phenylephedrine and later weaned off      4) Migraine:  -Cont. Topamax    5) JOSE LUIS:  -Not on NIV HS  -O/P pulm f/u    6) Asthma:  -PRN nebs    DVT Prophylaxis -- Venodyne.    Dispo: Likely [facility] in 48 hours.   
60 y/o female who was accepted for transfer from [facility] by Dr. Byrnes for evaluation of previous right THR on 5/2/22. Patient was admitted to [facility] on 8/27 s/p fall and found to have fractured femoral prosthesis/non union and may need bone graft. She denies any LOC or head trauma from fall, describes fall as leg giving out. She was seen by Cardiology prior to transfer and deemed stable for surgery. Denies any tori-operative issues in 5/22 during last revision. Other medical history sig for Asthma, obesity, JOSE LUIS not on NIV, Migraines, HTN.     1) Right Hip Osteotomy Nonunion  -s/p Right GT ORIF on 9/1/22   -Pain control, Incentive spirometer  -WBAT  -PT/OT  -Ortho following     2) ABLA  -Likely due to surgery  -Monitor H&H while on Lovenox   -Transfuse for Hb < 7    3) Epistaxis  -Resolved  -No prior hx of epistaxis  -s/p saline nasal spray    4) HTN  -DASH diet  -Hold Losartan and HCTZ due to soft BP   -Post op was given IVF and was started on Phenylephedrine and later weaned off  -- SICU consult appreciated postoperatively for hypotension.     5) Migraine  -Cont. Topamax    6) JOSE LUIS  -Not on NIV HS  -O/P pulm f/u    7) Asthma  -PRN nebs    8) Hypocalcemia  -Repleted    DVT Prophylaxis -- Lovenox SQ.     Dispo: [facility] likely on Tuesday (needs auth).   
62 y/o female medical history sig for Asthma, obesity, JOSE LUIS not on NIV, Migraines, HTN who was accepted for transfer from [facility] by Dr. Byrnes for evaluation of previous right THR on 5/2/22. Patient was admitted to [facility] on 8/27 s/p fall and found to have fractured femoral prosthesis/ non union and may need bone graft. She denies any LOC or head trauma from fall, describes fall as leg giving out. She was seen by Cardiology prior to transfer and deemed stable for surgery. Denies any tori-operative issues in 5/22 during last revision.     1) Right Hip Osteotomy Nonunion -s/p right hip ORIF periprosthetic fx 9/1/2022  -Pain control, Incentive spirometer  -WBAT  -PT/ OT  -Ortho following   -OR Cultures PRELIM rare Pseudomonas  -ID consulted- Maxipime started    2) ABLA post operative  - 1 PRBC 9/3/2022  - monitor H/H  - PO Iron    3) Epistaxis  -Resolved  -No prior hx of epistaxis  -s/p saline nasal spray    4) HTN  -DASH diet  -Hold Losartan and HCTZ due to soft BP   -Post op was given IVF and was started on Phenylephedrine and later weaned off  -- SICU consult appreciated postoperatively for hypotension.     5) Migraine  -Cont. Topamax    6) JOSE LUIS  -Not on NIV HS  -O/P pulm f/u    7) Asthma  -PRN nebs    8) Hypocalcemia  -Repleted    DVT Prophylaxis -- Lovenox SQ.     Dispo: [facility] likely on Tuesday (needs auth). ID decisions pending  
62 y/o female with non-union of previously revised right USHA, epistaxis Hx. of HTN, Migraines, Obesity, Asthma    Right Hip Osteotomy Non Union:  -OR on Thursday 9/1  -NPO after MN 9/1, hold LMWH prior to OR  -Pain control, Incentive spirometer  -No previous tori-operative events noted  -Cardiology eval from [facility] done   -No further tori-operative testing indicated  -Hold BP meds on day of surgery to avoid hypotension   -RCRI of 0     Epistaxis:  -Resolved  -No prior hx of epistaxis  -Continue saline nasal spray for likely dry nasal passages    HTN:  -DASH diet  -o/p regimen resumed    Migraine:  -Cont. Topamax    JOSE LUIS:  -Not on NIV HS  -O/P pulm f/u    Asthma:  -PRN nebs    DVT Prophylaxis -- Venodyne.    Dispo: Unclear at this time.

## 2022-09-06 NOTE — PROGRESS NOTE ADULT - SUBJECTIVE AND OBJECTIVE BOX
HEALTH ISSUES - PROBLEM Dx:  Asthma, obesity, JOSE LUIS not on NIV, Migraines, HTN.    fibrous nonunion proximal femur s/p repair    INTERVAL HPI/ OVERNIGHT EVENTS:    lying in bed  comfortable  planned PICC today    REVIEW OF SYSTEMS:    as above    Vital Signs Last 24 Hrs  T(C): 36.4 (06 Sep 2022 03:46), Max: 36.8 (05 Sep 2022 16:29)  T(F): 97.6 (06 Sep 2022 03:46), Max: 98.2 (05 Sep 2022 16:29)  HR: 86 (06 Sep 2022 03:46) (85 - 86)  BP: 113/72 (06 Sep 2022 03:46) (113/72 - 120/77)  BP(mean): --  RR: 18 (06 Sep 2022 03:46) (18 - 19)  SpO2: 94% (06 Sep 2022 03:46) (93% - 94%)    Parameters below as of 06 Sep 2022 03:46  Patient On (Oxygen Delivery Method): room air      PHYSICAL EXAM-  GENERAL: Comfortable, pain controlled  HEAD:  Atraumatic, Normocephalic  EYES: EOMI, PERRLA, conjunctiva and sclera clear  ENT: Moist mucous membranes, No lesions  NECK: Supple, No JVD, Normal thyroid  NERVOUS SYSTEM:  Alert & Oriented X 3, Moving tereza UE and left LE, right LE ankle full flexion and knee min flexion, Dressings C/D/I  CHEST/LUNG: CTA bilaterally; No rales, rhonchi, wheezing, or rubs  HEART: Regular rate and rhythm; No murmurs, rubs, or gallops  ABDOMEN: Soft, Nontender, Nondistended; Bowel sounds present  EXTREMITIES:  2+ Peripheral Pulses, No clubbing, cyanosis, or edema  SKIN: No rashes or lesions    MEDICATIONS  (STANDING):  acetaminophen     Tablet .. 975 milliGRAM(s) Oral every 8 hours  cefepime   IVPB 2000 milliGRAM(s) IV Intermittent every 8 hours  chlorhexidine 2% Cloths 1 Application(s) Topical <User Schedule>  enoxaparin Injectable 40 milliGRAM(s) SubCutaneous every 24 hours  ferrous    sulfate 325 milliGRAM(s) Oral daily  multivitamin 1 Tablet(s) Oral daily  polyethylene glycol 3350 17 Gram(s) Oral at bedtime  senna 2 Tablet(s) Oral at bedtime  topiramate 50 milliGRAM(s) Oral daily    MEDICATIONS  (PRN):  artificial  tears Solution 1 Drop(s) Both EYES every 4 hours PRN Dry Eyes  hydrALAZINE Injectable 10 milliGRAM(s) IV Push every 6 hours PRN If SBP > 160  magnesium hydroxide Suspension 30 milliLiter(s) Oral daily PRN Constipation  ondansetron Injectable 4 milliGRAM(s) IV Push every 6 hours PRN Nausea and/or Vomiting  oxyCODONE    IR 5 milliGRAM(s) Oral every 3 hours PRN Mild Pain (1 - 3)  oxyCODONE    IR 10 milliGRAM(s) Oral every 3 hours PRN Moderate Pain (4 - 6)  sodium chloride 0.9% lock flush 10 milliLiter(s) IV Push every 1 hour PRN Pre/post blood products, medications, blood draw, and to maintain line patency

## 2022-09-07 ENCOUNTER — TRANSCRIPTION ENCOUNTER (OUTPATIENT)
Age: 62
End: 2022-09-07

## 2022-09-07 VITALS
SYSTOLIC BLOOD PRESSURE: 127 MMHG | DIASTOLIC BLOOD PRESSURE: 79 MMHG | RESPIRATION RATE: 18 BRPM | OXYGEN SATURATION: 97 % | TEMPERATURE: 98 F | HEART RATE: 70 BPM

## 2022-09-07 PROCEDURE — 36430 TRANSFUSION BLD/BLD COMPNT: CPT

## 2022-09-07 PROCEDURE — 85045 AUTOMATED RETICULOCYTE COUNT: CPT

## 2022-09-07 PROCEDURE — U0005: CPT

## 2022-09-07 PROCEDURE — 84100 ASSAY OF PHOSPHORUS: CPT

## 2022-09-07 PROCEDURE — 87077 CULTURE AEROBIC IDENTIFY: CPT

## 2022-09-07 PROCEDURE — 86901 BLOOD TYPING SEROLOGIC RH(D): CPT

## 2022-09-07 PROCEDURE — 87070 CULTURE OTHR SPECIMN AEROBIC: CPT

## 2022-09-07 PROCEDURE — C1769: CPT

## 2022-09-07 PROCEDURE — 76000 FLUOROSCOPY <1 HR PHYS/QHP: CPT

## 2022-09-07 PROCEDURE — 87186 SC STD MICRODIL/AGAR DIL: CPT

## 2022-09-07 PROCEDURE — 88300 SURGICAL PATH GROSS: CPT

## 2022-09-07 PROCEDURE — C1889: CPT

## 2022-09-07 PROCEDURE — 86900 BLOOD TYPING SEROLOGIC ABO: CPT

## 2022-09-07 PROCEDURE — 71045 X-RAY EXAM CHEST 1 VIEW: CPT

## 2022-09-07 PROCEDURE — 83735 ASSAY OF MAGNESIUM: CPT

## 2022-09-07 PROCEDURE — C1713: CPT

## 2022-09-07 PROCEDURE — 36415 COLL VENOUS BLD VENIPUNCTURE: CPT

## 2022-09-07 PROCEDURE — 87040 BLOOD CULTURE FOR BACTERIA: CPT

## 2022-09-07 PROCEDURE — 85027 COMPLETE CBC AUTOMATED: CPT

## 2022-09-07 PROCEDURE — 86923 COMPATIBILITY TEST ELECTRIC: CPT

## 2022-09-07 PROCEDURE — 86850 RBC ANTIBODY SCREEN: CPT

## 2022-09-07 PROCEDURE — U0003: CPT

## 2022-09-07 PROCEDURE — P9016: CPT

## 2022-09-07 PROCEDURE — 87075 CULTR BACTERIA EXCEPT BLOOD: CPT

## 2022-09-07 PROCEDURE — 85610 PROTHROMBIN TIME: CPT

## 2022-09-07 PROCEDURE — 82962 GLUCOSE BLOOD TEST: CPT

## 2022-09-07 PROCEDURE — 73552 X-RAY EXAM OF FEMUR 2/>: CPT

## 2022-09-07 PROCEDURE — 94762 N-INVAS EAR/PLS OXIMTRY CONT: CPT

## 2022-09-07 PROCEDURE — 99239 HOSP IP/OBS DSCHRG MGMT >30: CPT

## 2022-09-07 PROCEDURE — 73502 X-RAY EXAM HIP UNI 2-3 VIEWS: CPT

## 2022-09-07 PROCEDURE — 80048 BASIC METABOLIC PNL TOTAL CA: CPT

## 2022-09-07 RX ADMIN — ONDANSETRON 4 MILLIGRAM(S): 8 TABLET, FILM COATED ORAL at 04:44

## 2022-09-07 RX ADMIN — Medication 975 MILLIGRAM(S): at 12:17

## 2022-09-07 RX ADMIN — OXYCODONE HYDROCHLORIDE 10 MILLIGRAM(S): 5 TABLET ORAL at 00:00

## 2022-09-07 RX ADMIN — OXYCODONE HYDROCHLORIDE 5 MILLIGRAM(S): 5 TABLET ORAL at 10:11

## 2022-09-07 RX ADMIN — OXYCODONE HYDROCHLORIDE 5 MILLIGRAM(S): 5 TABLET ORAL at 09:11

## 2022-09-07 RX ADMIN — CEFEPIME 100 MILLIGRAM(S): 1 INJECTION, POWDER, FOR SOLUTION INTRAMUSCULAR; INTRAVENOUS at 05:38

## 2022-09-07 RX ADMIN — Medication 1 TABLET(S): at 12:16

## 2022-09-07 RX ADMIN — Medication 325 MILLIGRAM(S): at 12:16

## 2022-09-07 RX ADMIN — Medication 975 MILLIGRAM(S): at 06:34

## 2022-09-07 RX ADMIN — Medication 975 MILLIGRAM(S): at 13:17

## 2022-09-07 RX ADMIN — CHLORHEXIDINE GLUCONATE 1 APPLICATION(S): 213 SOLUTION TOPICAL at 05:38

## 2022-09-07 RX ADMIN — OXYCODONE HYDROCHLORIDE 5 MILLIGRAM(S): 5 TABLET ORAL at 04:44

## 2022-09-07 RX ADMIN — ENOXAPARIN SODIUM 40 MILLIGRAM(S): 100 INJECTION SUBCUTANEOUS at 05:38

## 2022-09-07 RX ADMIN — OXYCODONE HYDROCHLORIDE 5 MILLIGRAM(S): 5 TABLET ORAL at 05:38

## 2022-09-07 RX ADMIN — Medication 975 MILLIGRAM(S): at 05:38

## 2022-09-07 RX ADMIN — CEFEPIME 100 MILLIGRAM(S): 1 INJECTION, POWDER, FOR SOLUTION INTRAMUSCULAR; INTRAVENOUS at 12:17

## 2022-09-07 RX ADMIN — Medication 50 MILLIGRAM(S): at 12:16

## 2022-09-07 NOTE — DISCHARGE NOTE NURSING/CASE MANAGEMENT/SOCIAL WORK - NSDCPEFALRISK_GEN_ALL_CORE
For information on Fall & Injury Prevention, visit: [email or web address] OR  [email or web address] OR  https://www.cdc.gov/steadi/patient.html

## 2022-09-07 NOTE — DISCHARGE NOTE NURSING/CASE MANAGEMENT/SOCIAL WORK - PATIENT PORTAL LINK FT
You can access the FollowMyHealth Patient Portal offered by [facility] by registering at the following website: [email or web address]. By joining [facility]’s FollowMyHealth portal, you will also be able to view your health information using other applications (apps) compatible with our system.

## 2022-09-07 NOTE — DISCHARGE NOTE NURSING/CASE MANAGEMENT/SOCIAL WORK - NSDCFUADDAPPT_GEN_ALL_CORE_FT
Will need Cefepime 2gm IV q 8hours till 10/28/22 (8 weeks) and she will likely need oral suppression after completion of IV antibiotics.    - Appointment with ID in 7 to 10 days - [phone]

## 2022-09-07 NOTE — DISCHARGE NOTE NURSING/CASE MANAGEMENT/SOCIAL WORK - NSDCVIVACCINE_GEN_ALL_CORE_FT
Tdap; 27-Aug-2022 12:46; Angeli Barnard (SHENG); [employer]; [id number] (Exp. Date: 18-Jan-2024); IntraMuscular; Deltoid Left.; 0.5 milliLiter(s); VIS (VIS Published: 18-Jan-2024, VIS Presented: 27-Aug-2022);

## 2022-09-09 LAB
CULTURE RESULTS: SIGNIFICANT CHANGE UP
CULTURE RESULTS: SIGNIFICANT CHANGE UP
SPECIMEN SOURCE: SIGNIFICANT CHANGE UP
SPECIMEN SOURCE: SIGNIFICANT CHANGE UP

## 2022-09-14 LAB
CULTURE RESULTS: SIGNIFICANT CHANGE UP
SPECIMEN SOURCE: SIGNIFICANT CHANGE UP

## 2022-09-15 LAB
CULTURE RESULTS: SIGNIFICANT CHANGE UP
CULTURE RESULTS: SIGNIFICANT CHANGE UP
ORGANISM # SPEC MICROSCOPIC CNT: SIGNIFICANT CHANGE UP
ORGANISM # SPEC MICROSCOPIC CNT: SIGNIFICANT CHANGE UP
SPECIMEN SOURCE: SIGNIFICANT CHANGE UP
SPECIMEN SOURCE: SIGNIFICANT CHANGE UP

## 2022-09-16 LAB
CULTURE RESULTS: SIGNIFICANT CHANGE UP
SPECIMEN SOURCE: SIGNIFICANT CHANGE UP

## 2022-09-18 LAB
CULTURE RESULTS: SIGNIFICANT CHANGE UP
ORGANISM # SPEC MICROSCOPIC CNT: SIGNIFICANT CHANGE UP
ORGANISM # SPEC MICROSCOPIC CNT: SIGNIFICANT CHANGE UP
SPECIMEN SOURCE: SIGNIFICANT CHANGE UP

## 2022-09-21 ENCOUNTER — APPOINTMENT (OUTPATIENT)
Dept: ORTHOPEDIC SURGERY | Facility: CLINIC | Age: 62
End: 2022-09-21

## 2022-09-21 PROCEDURE — 73502 X-RAY EXAM HIP UNI 2-3 VIEWS: CPT | Mod: RT

## 2022-09-21 PROCEDURE — 99024 POSTOP FOLLOW-UP VISIT: CPT

## 2022-09-21 NOTE — HISTORY OF PRESENT ILLNESS
[None] : None [Leg Pain] : no Leg Pain [Swelling] : no swelling [Ecchymosis] : no ecchymosis [de-identified] : s/p repair of right femur non union with allograft. 9/1/22 [de-identified] : Patient is a 61 year old female who presents to the office today for evaluation of her right hip s/p revision ORIF with allograft on 9/1/22. Today, she reports her pain has significantly improved since the surgery. She states she has been ambulating with a walker without difficulty. Denies any numbness or tingling. Denies any other acute orthopedic concerns at this time.  [de-identified] : Right Lower Extremity\par Incisions c/d/i without erythema or ecchymosis\par No bony ttp\par +Painless ROM of Knee/Ankle and Toes\par +EHL/FHL/TA/GSC\par +SILT L3-S1\par +DP\par No calf tenderness\par Compartments soft and compressible [de-identified] : XR R Hip (2 views, 9/21/22): Demonstrates interval healing of a right femur non-union s/p ORIF. Hardware and fracture alignment is maintained in good position. [de-identified] : 61F s/p R Femur Revision ORIF w/ Allograft 9/1/22 [de-identified] : We discussed that given the positive OR cultures (Pseudomonas) the current course of action will be to continue IV antibiotics for the full 8-week course as instructed by infectious disease. During this time, the patient was advised she can be WBAT RLE without restriction with a walker as needed. She was provided with a prescription for physical therapy to improve her strength and ROM. Following the conclusion of the antibiotics, the patient was advised to return to the office for repeat imaging and evaluation. At that juncture, we will reassess if any further procedures are warranted, and a collaborative discussion will be held with infectious disease regarding the need for chronic suppressive antibiotics. The condition, natural history, and prognosis were explained to the patient and family. The clinical findings and images were reviewed with the patient.All questions and concerns were addressed during today's visit. The patient verbalized an understanding and are in agreement with the above plan.\par  \par \par Orthopaedic Trauma Surgeon Addendum:\par \par I agree with the above resident physician note.  \par Chart was reviewed and patient examined in the orthopedic office. I agree with the assessment and plan of the resident.\par \par I was physically present for the key portions of the evaluation and management service provided. I agree with the above history, physical and plan. Appropriate imaging has been reviewed and the plan adjusted as needed.\par \par Jose Chang MD\par Orthopaedic Trauma Surgeon\par [facility]\par [facility] [place]

## 2022-09-30 ENCOUNTER — APPOINTMENT (OUTPATIENT)
Dept: INTERNAL MEDICINE | Facility: CLINIC | Age: 62
End: 2022-09-30

## 2022-09-30 PROCEDURE — 99443: CPT | Mod: 95

## 2022-10-06 ENCOUNTER — INPATIENT (INPATIENT)
Facility: HOSPITAL | Age: 62
LOS: 8 days | Discharge: NURSING, REHAB OR LONG-TERM CARE | DRG: 196 | End: 2022-10-15
Attending: HOSPITALIST | Admitting: HOSPITALIST
Payer: COMMERCIAL

## 2022-10-06 VITALS
HEART RATE: 92 BPM | SYSTOLIC BLOOD PRESSURE: 136 MMHG | OXYGEN SATURATION: 97 % | RESPIRATION RATE: 16 BRPM | WEIGHT: 210.1 LBS | TEMPERATURE: 99 F | DIASTOLIC BLOOD PRESSURE: 85 MMHG | HEIGHT: 65 IN

## 2022-10-06 DIAGNOSIS — Z98.891 HISTORY OF UTERINE SCAR FROM PREVIOUS SURGERY: Chronic | ICD-10-CM

## 2022-10-06 DIAGNOSIS — Z98.890 OTHER SPECIFIED POSTPROCEDURAL STATES: Chronic | ICD-10-CM

## 2022-10-06 DIAGNOSIS — Z96.641 PRESENCE OF RIGHT ARTIFICIAL HIP JOINT: Chronic | ICD-10-CM

## 2022-10-06 DIAGNOSIS — J18.9 PNEUMONIA, UNSPECIFIED ORGANISM: ICD-10-CM

## 2022-10-06 LAB
ALBUMIN SERPL ELPH-MCNC: 3.6 G/DL — SIGNIFICANT CHANGE UP (ref 3.3–5)
ALP SERPL-CCNC: 84 U/L — SIGNIFICANT CHANGE UP (ref 40–120)
ALT FLD-CCNC: 13 U/L — SIGNIFICANT CHANGE UP (ref 10–45)
ANION GAP SERPL CALC-SCNC: 12 MMOL/L — SIGNIFICANT CHANGE UP (ref 5–17)
APPEARANCE UR: CLEAR — SIGNIFICANT CHANGE UP
AST SERPL-CCNC: 14 U/L — SIGNIFICANT CHANGE UP (ref 10–40)
BACTERIA # UR AUTO: NEGATIVE — SIGNIFICANT CHANGE UP
BASE EXCESS BLDV CALC-SCNC: -3.1 MMOL/L — LOW (ref -2–3)
BASOPHILS # BLD AUTO: 0.06 K/UL — SIGNIFICANT CHANGE UP (ref 0–0.2)
BASOPHILS NFR BLD AUTO: 0.4 % — SIGNIFICANT CHANGE UP (ref 0–2)
BILIRUB SERPL-MCNC: 0.2 MG/DL — SIGNIFICANT CHANGE UP (ref 0.2–1.2)
BILIRUB UR-MCNC: NEGATIVE — SIGNIFICANT CHANGE UP
BUN SERPL-MCNC: 11 MG/DL — SIGNIFICANT CHANGE UP (ref 7–23)
CA-I SERPL-SCNC: 1.28 MMOL/L — SIGNIFICANT CHANGE UP (ref 1.15–1.33)
CALCIUM SERPL-MCNC: 9.7 MG/DL — SIGNIFICANT CHANGE UP (ref 8.4–10.5)
CHLORIDE BLDV-SCNC: 102 MMOL/L — SIGNIFICANT CHANGE UP (ref 96–108)
CHLORIDE SERPL-SCNC: 102 MMOL/L — SIGNIFICANT CHANGE UP (ref 96–108)
CO2 BLDV-SCNC: 24 MMOL/L — SIGNIFICANT CHANGE UP (ref 22–26)
CO2 SERPL-SCNC: 21 MMOL/L — LOW (ref 22–31)
COLOR SPEC: SIGNIFICANT CHANGE UP
CREAT SERPL-MCNC: 0.59 MG/DL — SIGNIFICANT CHANGE UP (ref 0.5–1.3)
DIFF PNL FLD: NEGATIVE — SIGNIFICANT CHANGE UP
EGFR: 102 ML/MIN/1.73M2 — SIGNIFICANT CHANGE UP
EOSINOPHIL # BLD AUTO: 0.17 K/UL — SIGNIFICANT CHANGE UP (ref 0–0.5)
EOSINOPHIL NFR BLD AUTO: 1.2 % — SIGNIFICANT CHANGE UP (ref 0–6)
EPI CELLS # UR: 6 /HPF — HIGH
GAS PNL BLDV: 134 MMOL/L — LOW (ref 136–145)
GAS PNL BLDV: SIGNIFICANT CHANGE UP
GAS PNL BLDV: SIGNIFICANT CHANGE UP
GLUCOSE BLDV-MCNC: 112 MG/DL — HIGH (ref 70–99)
GLUCOSE SERPL-MCNC: 114 MG/DL — HIGH (ref 70–99)
GLUCOSE UR QL: NEGATIVE — SIGNIFICANT CHANGE UP
HCO3 BLDV-SCNC: 23 MMOL/L — SIGNIFICANT CHANGE UP (ref 22–29)
HCT VFR BLD CALC: 31.4 % — LOW (ref 34.5–45)
HCT VFR BLDA CALC: 32 % — LOW (ref 34.5–46.5)
HGB BLD CALC-MCNC: 10.5 G/DL — LOW (ref 11.7–16.1)
HGB BLD-MCNC: 9.8 G/DL — LOW (ref 11.5–15.5)
HYALINE CASTS # UR AUTO: 4 /LPF — HIGH (ref 0–2)
IMM GRANULOCYTES NFR BLD AUTO: 1.4 % — HIGH (ref 0–0.9)
KETONES UR-MCNC: NEGATIVE — SIGNIFICANT CHANGE UP
LACTATE BLDV-MCNC: 1.6 MMOL/L — SIGNIFICANT CHANGE UP (ref 0.5–2)
LEUKOCYTE ESTERASE UR-ACNC: NEGATIVE — SIGNIFICANT CHANGE UP
LYMPHOCYTES # BLD AUTO: 1.13 K/UL — SIGNIFICANT CHANGE UP (ref 1–3.3)
LYMPHOCYTES # BLD AUTO: 7.8 % — LOW (ref 13–44)
MCHC RBC-ENTMCNC: 27.1 PG — SIGNIFICANT CHANGE UP (ref 27–34)
MCHC RBC-ENTMCNC: 31.2 GM/DL — LOW (ref 32–36)
MCV RBC AUTO: 87 FL — SIGNIFICANT CHANGE UP (ref 80–100)
MONOCYTES # BLD AUTO: 0.96 K/UL — HIGH (ref 0–0.9)
MONOCYTES NFR BLD AUTO: 6.6 % — SIGNIFICANT CHANGE UP (ref 2–14)
NEUTROPHILS # BLD AUTO: 12.06 K/UL — HIGH (ref 1.8–7.4)
NEUTROPHILS NFR BLD AUTO: 82.6 % — HIGH (ref 43–77)
NITRITE UR-MCNC: NEGATIVE — SIGNIFICANT CHANGE UP
NRBC # BLD: 0 /100 WBCS — SIGNIFICANT CHANGE UP (ref 0–0)
NT-PROBNP SERPL-SCNC: 48 PG/ML — SIGNIFICANT CHANGE UP (ref 0–300)
PCO2 BLDV: 43 MMHG — HIGH (ref 39–42)
PH BLDV: 7.33 — SIGNIFICANT CHANGE UP (ref 7.32–7.43)
PH UR: 5.5 — SIGNIFICANT CHANGE UP (ref 5–8)
PLATELET # BLD AUTO: 663 K/UL — HIGH (ref 150–400)
PO2 BLDV: 25 MMHG — SIGNIFICANT CHANGE UP (ref 25–45)
POTASSIUM BLDV-SCNC: 5 MMOL/L — SIGNIFICANT CHANGE UP (ref 3.5–5.1)
POTASSIUM SERPL-MCNC: 4.7 MMOL/L — SIGNIFICANT CHANGE UP (ref 3.5–5.3)
POTASSIUM SERPL-SCNC: 4.7 MMOL/L — SIGNIFICANT CHANGE UP (ref 3.5–5.3)
PROT SERPL-MCNC: 7.6 G/DL — SIGNIFICANT CHANGE UP (ref 6–8.3)
PROT UR-MCNC: ABNORMAL
RAPID RVP RESULT: SIGNIFICANT CHANGE UP
RBC # BLD: 3.61 M/UL — LOW (ref 3.8–5.2)
RBC # FLD: 15.7 % — HIGH (ref 10.3–14.5)
RBC CASTS # UR COMP ASSIST: 1 /HPF — SIGNIFICANT CHANGE UP (ref 0–4)
SAO2 % BLDV: 34.2 % — LOW (ref 67–88)
SARS-COV-2 RNA SPEC QL NAA+PROBE: SIGNIFICANT CHANGE UP
SODIUM SERPL-SCNC: 135 MMOL/L — SIGNIFICANT CHANGE UP (ref 135–145)
SP GR SPEC: 1.01 — SIGNIFICANT CHANGE UP (ref 1.01–1.02)
TROPONIN T, HIGH SENSITIVITY RESULT: 25 NG/L — SIGNIFICANT CHANGE UP (ref 0–51)
UROBILINOGEN FLD QL: NEGATIVE — SIGNIFICANT CHANGE UP
WBC # BLD: 14.58 K/UL — HIGH (ref 3.8–10.5)
WBC # FLD AUTO: 14.58 K/UL — HIGH (ref 3.8–10.5)
WBC UR QL: 3 /HPF — SIGNIFICANT CHANGE UP (ref 0–5)

## 2022-10-06 PROCEDURE — 74177 CT ABD & PELVIS W/CONTRAST: CPT | Mod: 26,MA

## 2022-10-06 PROCEDURE — 93010 ELECTROCARDIOGRAM REPORT: CPT

## 2022-10-06 PROCEDURE — 99285 EMERGENCY DEPT VISIT HI MDM: CPT

## 2022-10-06 PROCEDURE — 71275 CT ANGIOGRAPHY CHEST: CPT | Mod: 26,MA

## 2022-10-06 PROCEDURE — 71046 X-RAY EXAM CHEST 2 VIEWS: CPT | Mod: 26

## 2022-10-06 RX ORDER — SENNA PLUS 8.6 MG/1
2 TABLET ORAL AT BEDTIME
Refills: 0 | Status: DISCONTINUED | OUTPATIENT
Start: 2022-10-06 | End: 2022-10-15

## 2022-10-06 RX ORDER — FERROUS SULFATE 325(65) MG
325 TABLET ORAL DAILY
Refills: 0 | Status: DISCONTINUED | OUTPATIENT
Start: 2022-10-06 | End: 2022-10-15

## 2022-10-06 RX ORDER — LOSARTAN POTASSIUM 100 MG/1
50 TABLET, FILM COATED ORAL DAILY
Refills: 0 | Status: DISCONTINUED | OUTPATIENT
Start: 2022-10-06 | End: 2022-10-15

## 2022-10-06 RX ORDER — ACETAMINOPHEN 500 MG
1000 TABLET ORAL ONCE
Refills: 0 | Status: COMPLETED | OUTPATIENT
Start: 2022-10-06 | End: 2022-10-06

## 2022-10-06 RX ORDER — AZITHROMYCIN 500 MG/1
500 TABLET, FILM COATED ORAL ONCE
Refills: 0 | Status: COMPLETED | OUTPATIENT
Start: 2022-10-06 | End: 2022-10-06

## 2022-10-06 RX ORDER — TOPIRAMATE 25 MG
50 TABLET ORAL DAILY
Refills: 0 | Status: DISCONTINUED | OUTPATIENT
Start: 2022-10-06 | End: 2022-10-15

## 2022-10-06 RX ORDER — CEFTRIAXONE 500 MG/1
1000 INJECTION, POWDER, FOR SOLUTION INTRAMUSCULAR; INTRAVENOUS ONCE
Refills: 0 | Status: COMPLETED | OUTPATIENT
Start: 2022-10-06 | End: 2022-10-06

## 2022-10-06 RX ORDER — CEFEPIME 1 G/1
2000 INJECTION, POWDER, FOR SOLUTION INTRAMUSCULAR; INTRAVENOUS EVERY 8 HOURS
Refills: 0 | Status: DISCONTINUED | OUTPATIENT
Start: 2022-10-06 | End: 2022-10-15

## 2022-10-06 RX ORDER — POLYETHYLENE GLYCOL 3350 17 G/17G
17 POWDER, FOR SOLUTION ORAL DAILY
Refills: 0 | Status: DISCONTINUED | OUTPATIENT
Start: 2022-10-06 | End: 2022-10-15

## 2022-10-06 RX ORDER — ENOXAPARIN SODIUM 100 MG/ML
40 INJECTION SUBCUTANEOUS EVERY 24 HOURS
Refills: 0 | Status: DISCONTINUED | OUTPATIENT
Start: 2022-10-06 | End: 2022-10-15

## 2022-10-06 RX ORDER — OXYCODONE HYDROCHLORIDE 5 MG/1
5 TABLET ORAL EVERY 4 HOURS
Refills: 0 | Status: DISCONTINUED | OUTPATIENT
Start: 2022-10-06 | End: 2022-10-06

## 2022-10-06 RX ORDER — AZITHROMYCIN 500 MG/1
250 TABLET, FILM COATED ORAL DAILY
Refills: 0 | Status: DISCONTINUED | OUTPATIENT
Start: 2022-10-06 | End: 2022-10-07

## 2022-10-06 RX ORDER — ONDANSETRON 8 MG/1
4 TABLET, FILM COATED ORAL ONCE
Refills: 0 | Status: COMPLETED | OUTPATIENT
Start: 2022-10-06 | End: 2022-10-06

## 2022-10-06 RX ADMIN — CEFTRIAXONE 100 MILLIGRAM(S): 500 INJECTION, POWDER, FOR SOLUTION INTRAMUSCULAR; INTRAVENOUS at 19:50

## 2022-10-06 RX ADMIN — AZITHROMYCIN 255 MILLIGRAM(S): 500 TABLET, FILM COATED ORAL at 20:44

## 2022-10-06 RX ADMIN — ONDANSETRON 4 MILLIGRAM(S): 8 TABLET, FILM COATED ORAL at 17:24

## 2022-10-06 RX ADMIN — Medication 400 MILLIGRAM(S): at 17:24

## 2022-10-06 NOTE — ED PROVIDER NOTE - PHYSICAL EXAMINATION
GENERAL: Awake, alert, NAD  LUNGS: CTAB, no wheezes or crackles   CARDIAC: RRR, no m/r/g  ABDOMEN: Soft, , mild tori-umbical tenderness , non distended, no rebound, no guarding  BACK: No midline spinal tenderness, no CVA tenderness  EXT: No edema, no calf tenderness,   NEURO: A&Ox3. Moving all extremities.  SKIN: Warm and dry. No rash.  PSYCH: Normal affect.     off oxygen satting 92

## 2022-10-06 NOTE — ED PROVIDER NOTE - ST/T WAVE
HISTORY OF PRESENT ILLNESS:  I again had the pleasure of seeing your patient, Rupinder Tracy, at [facility] for evaluation of pulmonary hypertension, systemic hypertension and patent foramen ovale.  Left and right heart catheterization was performed in 01/2015 for pulmonary hypertension and aortic insufficiency.  Coronary angiography demonstrated no significant coronary artery disease.  Aortogram showed moderate aortic insufficiency and moderate annuloaortic ectasia.  There was moderate pulmonary hypertension and a mean PA pressure of 32 mmHg and a wide pulse pressure in the PA tracing consistent with at least moderate pulmonary valve insufficiency.  Pulmonary vascular resistance was normal and there was no significant gradient between the diastolic and pulmonary capillary wedge pressure and left ventricular end-diastolic pressure.  Pulmonary capillary wedge pressure was only 16 mmHg and PAD 12 mmHg.  This would indicate no significant pulmonary hypertension.  It was felt the pulmonary hypertension was likely secondary to impaired relaxation from both aortic insufficiency, hypertension and pulmonary causes such as obstructive sleep apnea or asthma.  Testing for obstructive sleep apnea showed desaturations but she never reached REM sleep and did not snore.  Left ventricular end-diastolic pressure was 15 mmHg.  The patient's blood pressure has been under excellent control with her current medications.  The patient's weight is variable and I think her salt intake causes much of her weight change.  She is now doing well on Bumex 2 mg per day.  She feels considerably better when she loses weight.  She wears compression stockings on the lower extremities.  Her last echocardiogram 11/03 of last year showed ejection fraction of 60%-65% with mild to moderate concentric left ventricular hypertrophy.  There was mild mitral stenosis but a valve gradient of only 4 mmHg.  Pulmonary hypertension was  present at 46-55 mmHg.  Moderate aortic insufficiency.  Mild to moderate pulmonic valvular insufficiency.  Aortic root was dilated at 4.3 cm, ascending aorta 3.8 cm.  This is unchanged from 1 year before.      PHYSICAL EXAMINATION:  Current blood pressure is 128/58, pulse is 72 and regularly irregular.  Weight is 157 pounds, BMI 27.  O2 saturation sitting is between 94%-95%.  I then walked this patient for approximately 2 minutes in the hallway with her O2 saturations dropping to 90% and her heart rate up to 100 beats per minute.  She was profoundly short of breath with this.  Chest is clear to auscultation.  Cardiac exam:  Regular rate and rhythm with occasional ectopics.  At times, trigeminal ectopics.  No gallop or murmur.  No JVD or HJR.  Pulses are all intact without bruits.  Abdomen is benign.  Extremities are without cyanosis, clubbing or edema.  She wears bilateral support stockings.  HEENT:  She is blind in the right eye and her left eyelid is partially stitched closed.      ASSESSMENT:   1.  Rupinder Tracy is a delightful 77, almost 78-year-old female with a small patent foramen ovale that is inconsequential and without hemodynamic effects.  She has pulmonary hypertension on a secondary basis, most likely due to diastolic dysfunction of the left ventricle, aortic insufficiency and possible pulmonary causes such as asthma or obstructive sleep apnea.  She is tolerating her current dose of Bumex and her blood pressure is reasonably well controlled.  Her weight is stable and I have made no changes.  She feels reasonably well but does have dyspnea on exertion.   2.  The patient does desaturate with exercise.  I have asked her to begin an exercise program such as a NuStep or walking.   3.  The patient has normal coronary arteries.   4.  Mild aortic insufficiency with mild aortic root enlargement.  We will retest this in 6 months.   5.  She does desaturate a bit with exercise as mentioned above.  She wears  oxygen at night.  I do not believe she needs oxygen during the day.  She will continue to follow a low-salt diet.      It is my pleasure to assist in the care of Judy Tracy.  A BMP was performed today and is pending.  I will see her in 6 months with an echocardiogram at that time.  She will call for any worsening shortness of breath, edema or weight gain.  All her questions were answered to her satisfaction.      Henrietta Collins MD      cc:   Abran Mott MD   [address] [address]         HENRIETTA COLLINS MD, FACC             D: 05/10/2017 10:36   T: 05/10/2017 18:41   MT: BELINDA      Name:     JUDY TRACY   MRN:      [id number]        Account:      [id number]   :      1939           Service Date: 05/10/2017      Document: [id number]     No st elevations or depressions.  Grossly normal t wave morphology.

## 2022-10-06 NOTE — H&P ADULT - NSICDXPASTSURGICALHX_GEN_ALL_CORE_FT
PAST SURGICAL HISTORY:  H/O  section     History of bunionectomy right foot     History of total right hip replacement 2017 at [facility]    S/P excision of lipoma [id number]

## 2022-10-06 NOTE — H&P ADULT - ASSESSMENT
62 y/o female medical history sig for Asthma, obesity, JOSE LUIS not on NIV, Migraines, HTN who was accepted for transfer from [facility] by Dr. Byrnes for evaluation of previous right THR on 5/2/22. Patient was admitted to [facility] on 8/27 s/p fall and found to have fractured femoral prosthesis/ non union and may need bone graft. She denies any LOC or head trauma from fall, describes fall as leg giving out. She was seen by Cardiology prior to transfer and deemed stable for surgery. Denies any tori-operative issues in 5/22 during last revision.      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 WBAT. PT/ OT. Ortho following . OR Cultures PRELIM rare Pseudomonas. ID consulted and Cefepime started antibiotics through 10/28/22 for total 8 weeks. Will need suppression thereafter.  ABLA post operative - s/p 1 PRBC 9/3/2022, monitor H/H. PO Iron. EPO 40 K x 1  HTN- Resume Losartan and hold HCTZ due to soft BP.  Migraine - Cont. Topamax  JOSE LUIS- Not on NIV HS. O/P pulm f/u  Asthma- PRN nebs  DVT Prophylaxis -- discontinued Lovenox SQ , no longer needed  . Patient able to ambulate more than 70 feet a day 60 y/o female medical history sig for Asthma, obesity, JOSE LUIS not on NIV, Migraines, HTN who was accepted for transfer from [facility] by Dr. Byrnes for evaluation of previous right THR on 5/2/22. Patient was admitted to [facility] on 8/27 s/p fall and found to have fractured femoral prosthesis/ non union and may need bone graft. She denies any LOC or head trauma from fall, describes fall as leg giving out. She was seen by Cardiology prior to transfer and deemed stable for surgery. Denies any tori-operative issues in 5/22 during last revision.    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 WBAT. PT/ OT. Ortho following . OR Cultures PRELIM rare Pseudomonas. ID consulted and Cefepime started antibiotics through 10/28/22 for total 8 weeks. Will need suppression thereafter.  ABLA post operative - s/p 1 PRBC 9/3/2022, monitor H/H. PO Iron. EPO 40 K x 1  HTN- Resume Losartan and hold HCTZ due to soft BP.  Migraine - Cont. Topamax  JOSE LUIS- Not on NIV HS. O/P pulm f/u  Asthma- PRN nebs  DVT Prophylaxis -- discontinued Lovenox SQ , no longer needed  . Patient able to ambulate more than 70 feet a day 60 y/o female medical history sig for Asthma, obesity, JOSE LUIS not on NIV, Migraines, HTN who was accepted for transfer from [facility] by Dr. Byrnes for evaluation of previous right THR on 5/2/22. Patient was admitted to [facility] on 8/27 s/p fall and found to have fractured femoral prosthesis/ non union and may need bone graft. She denies any LOC or head trauma from fall, describes fall as leg giving out. She was seen by Cardiology prior to transfer and deemed stable for surgery. Denies any tori-operative issues in 5/22 during last revision.    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 WBAT. PT/ OT. Ortho following . OR Cultures PRELIM rare Pseudomonas. ID consulted and Cefepime started antibiotics through 10/28/22 for total 8 weeks. Will need suppression thereafter.  HTN-  Losartan and hold HCTZ due to soft BP.  Migraine - Cont. Topamax  JOSE LUIS- Not on NIV HS. O/P pulm f/u  Asthma- PRN nebs  DVT Prophylaxis -- Lovenox SQ   Abdominal hernia - reduced, asymptomatic.

## 2022-10-06 NOTE — ED PROVIDER NOTE - ATTENDING CONTRIBUTION TO CARE
Attending Statement (ESTELA Cortes MD):    HPI: 60y/o F with h/o HTN, asthma, JOSE LUIS, OA, s/p right hip total hip replacement c/b infection (pseudomonas) now s/p revision (5 weeks ago) and on cefepime (LUE PICC line); who presents from subacute rehabilitation center for evaluation; complaining of abdominal pain that began about 3 hours ago, reports sudden onset of a "protrusion" from the center of the abdomen    Review of Systems:  -General: no fever or chills  -ENT: no congestion, no difficulty swallowing  -Pulmonary: no cough, no shortness of breath  -Cardiac: no chest pain, no palpitations  -Gastrointestinal: no abdominal pain, no nausea, no vomiting, and no diarrhea.  -Genitourinary: no blood or pain with urination  -Musculoskeletal: no back or neck pain  -Skin: no rashes  -Endocrine: No h/o diabetes or thyroid disease  -Neurologic: No new weakness or numbness in extremities    All else negative unless otherwise specified elsewhere in this note.    PSH/PMH as noted above    On Physical Exam:  General: well appearing, in NAD, speaking clearly in full sentences and without difficulty; cooperative with exam  HEENT: PERRL, MMM  Neck: no neck tenderness, no nuchal rigidity  Cardiac: normal s1, s2; RRR; no MGR  Lungs: CTABL  Abdomen: soft nontender/nondistended  : no bladder tenderness or distension  Skin: intact, no rash  Extremities: no peripheral edema, no gross deformities  Neuro: no gross neurologic deficits    MDM: Attending Statement (ESTELA Cortes MD):    HPI: 60y/o F with h/o HTN, asthma, JOSE LUIS, OA, s/p right hip total hip replacement c/b infection (pseudomonas) now s/p revision (5 weeks ago) and on cefepime (LUE PICC line); who presents from [facility] rehabilitation center for evaluation; complaining of abdominal pain that began about 3 hours ago, reports sudden onset of a "protrusion" from the center of the abdomen that has resolved since arriving to the ED; states it was accompanied with severe pain at that site; no h/o hernia; remote PSH/o c/section but no other abdominal surgeries. No n/v/d/constipation. Also reports that she has been having chest pain associated with deep breaths for the past 1 week and feels short of breath as well; +cough (nonproductive); no wheezing.  No calf swelling or pain.     Review of Systems:  -General: no fever or chills  -ENT: no congestion, no difficulty swallowing  -Pulmonary: +cough, +shortness of breath  -Cardiac: +chest pain, no palpitations  -Gastrointestinal: no abdominal pain, no nausea, no vomiting, and no diarrhea.  -Genitourinary: no blood or pain with urination  -Musculoskeletal: no back or neck pain  -Skin: no rashes  -Endocrine: No h/o diabetes  -Neurologic: No new weakness or numbness in extremities    All else negative unless otherwise specified elsewhere in this note.    PSH/PMH as noted above    On Physical Exam:  General: well appearing, in NAD, speaking clearly in full sentences and without difficulty; cooperative with exam  HEENT: anicteric sclera, airway patent  Neck: no JVD  Cardiac: normal s1, s2; RRR; no MGR  Lungs: CTABL; no wheezing/rhonchi/rales; no evidence of respiratory distress; on RA 92-3%; 96% on 2L O2 NC  Abdomen: soft nondistended; mild periumbillical tenderness w/o rebound/guarding  : no bladder tenderness or distension  Skin: intact, no rash  Extremities: no peripheral edema, no gross deformities    MDM: : 60y/o F with h/o HTN, asthma, JOSE LUIS, OA, s/p right hip total hip replacement c/b infection (pseudomonas) now s/p revision (5 weeks ago) and on cefepime (LUE PICC line); who presents from subacute rehabilitation center for evaluation; complaining of abdominal pain x2-3 hours/resolved; and CP/SOB with pleuritic component.  1. Chest pain/sob; +cough; eval for infection vs PE, less likely ACS (given overall presentation): check ECG, troponin/proBNP, CXR, consider CTA chest to r/o PE  2. Abdominal pain, resolved but still mild tenderness; suspect ventral hernia; check screening labs: cbc (to evaluate for leukocytosis or anemia), CMP (to evaluate for electrolyte abnormalities or renal/liver dysfunction) and lipase (to evaluate for pancreatitis); obtain CT A/P to further evaluate.  -Disposition pending reiew of labs/imaging.

## 2022-10-06 NOTE — H&P ADULT - HISTORY OF PRESENT ILLNESS
60 y/o female medical history sig for Asthma, obesity, JOSE LUIS not on NIV, Migraines, HTN who was accepted for transfer from [facility] by Dr. Byrnes for evaluation of previous right THR on 5/2/22. Patient was admitted to [facility] on 8/27 s/p fall and found to have fractured femoral prosthesis/ non union and may need bone graft. She denies any LOC or head trauma from fall, describes fall as leg giving out. She was seen by Cardiology prior to transfer and deemed stable for surgery. Denies any tori-operative issues in 5/22 during last revision.     62 y/o female medical history sig for Asthma, obesity, JOSE LUIS not on NIV, Migraines, HTN who was accepted for transfer from [facility] by Dr. Byrnes for evaluation of previous right THR on 5/2/22. Patient was admitted to [facility] on 8/27 s/p fall and found to have fractured femoral prosthesis/ non union and may need bone graft. She denies any LOC or head trauma from fall, describes fall as leg giving out. She was seen by Cardiology prior to transfer and deemed stable for surgery. Denies any tori-operative issues in 5/22 during last revision.    Patient transferred for abdominal hernia, non-reducible but reduced in ER. Patient was hypoxic in ER. CT chest was negative for PE but showed multifocal infiltration. Started on IV Ceftriaxone and Zithromycin.

## 2022-10-06 NOTE — H&P ADULT - NSHPPHYSICALEXAM_GEN_ALL_CORE
PHYSICAL EXAM    Constitutional: NAD, well-groomed, well-developed  HEENT: PERRLA, EOMI, Normal Hearing, MMM  Neck: No LAD, No JVD  Back: Normal spine flexure, No CVA tenderness  Respiratory: CTAB/L   Cardiovascular: S1 and S2, RRR, no M/G/R  Gastrointestinal: abdominal wall hernia non-reducible  Extremities: No peripheral edema  Vascular: 2+ peripheral pulses  Neurological: A/O x 3, no focal deficits  Skin: No rashes PHYSICAL EXAM    Constitutional: NAD, well-groomed, well-developed  HEENT: PERRLA, EOMI, Normal Hearing, MMM  Neck: No LAD, No JVD  Back: Normal spine flexure, No CVA tenderness  Respiratory: CTAB/L   Cardiovascular: S1 and S2, RRR, no M/G/R  Gastrointestinal: abdomen mildly tender, no hernia  Extremities: No peripheral edema  Vascular: 2+ peripheral pulses  Neurological: A/O x 3, no focal deficits  Skin: No rashes

## 2022-10-06 NOTE — ED PROVIDER NOTE - OBJECTIVE STATEMENT
61F cc abd pain pmh htn, prior pseudomonas infection. pt notes 2-3 hours ago prior to ed arrival had periumbical abd pain. Pt notes pain was severe, wrapped around lower abd, pt is currently pain free. PT has never had abd surgery, is getting treatment with cefipime, had active pseudomonas reportedly after hip reversion. Also endorsing sob, worse w/ exertion, pleuritic in nature.

## 2022-10-06 NOTE — H&P ADULT - NSHPREVIEWOFSYSTEMS_GEN_ALL_CORE
REVIEW OF SYSTEMS:  CONSTITUTIONAL: No fever, weight loss, or fatigue  EYES: No eye pain, visual disturbances, or discharge  ENMT:  No difficulty hearing, tinnitus, vertigo; No sinus or throat pain  NECK: No pain or stiffness  BREASTS: No pain, masses, or nipple discharge  RESPIRATORY: No cough, wheezing, chills or hemoptysis; No shortness of breath  CARDIOVASCULAR: No chest pain, palpitations, dizziness, or leg swelling  GASTROINTESTINAL: No abdominal or epigastric pain. No nausea, vomiting, or hematemesis; No diarrhea or constipation. No melena or hematochezia.  GENITOURINARY: No dysuria, frequency, hematuria, or incontinence  NEUROLOGICAL: No headaches, memory loss, loss of strength, numbness, or tremors  SKIN: No itching, burning, rashes, or lesions   LYMPH NODES: No enlarged glands  ENDOCRINE: No heat or cold intolerance; No hair loss; No polydipsia or polyuria  MUSCULOSKELETAL: No joint pain or swelling; No muscle, back, or extremity pain  PSYCHIATRIC: No depression, anxiety, mood swings, or difficulty sleeping  HEME/LYMPH: No easy bruising, or bleeding gums  ALLERGY AND IMMUNOLOGIC: No hives or eczema REVIEW OF SYSTEMS:  CONSTITUTIONAL: No fever, weight loss, or fatigue  EYES: No eye pain, visual disturbances, or discharge  ENMT:  No difficulty hearing, tinnitus, vertigo; No sinus or throat pain  NECK: No pain or stiffness  BREASTS: No pain, masses, or nipple discharge  RESPIRATORY: No cough, wheezing, chills or hemoptysis; +shortness of breath  CARDIOVASCULAR: No chest pain, palpitations, dizziness, or leg swelling  GASTROINTESTINAL: No abdominal or epigastric pain. No nausea, vomiting, or hematemesis; No diarrhea or constipation. No melena or hematochezia.  GENITOURINARY: No dysuria, frequency, hematuria, or incontinence  NEUROLOGICAL: No headaches, memory loss, loss of strength, numbness, or tremors  SKIN: No itching, burning, rashes, or lesions   LYMPH NODES: No enlarged glands  ENDOCRINE: No heat or cold intolerance; No hair loss; No polydipsia or polyuria  MUSCULOSKELETAL: No joint pain or swelling; No muscle, back, or extremity pain  PSYCHIATRIC: No depression, anxiety, mood swings, or difficulty sleeping  HEME/LYMPH: No easy bruising, or bleeding gums  ALLERGY AND IMMUNOLOGIC: No hives or eczema

## 2022-10-06 NOTE — ED PROVIDER NOTE - NSICDXPASTSURGICALHX_GEN_ALL_CORE_FT
PAST SURGICAL HISTORY:  H/O  section     History of bunionectomy right foot     History of total right hip replacement 2017 at [facility]    S/P excision of lipoma [phone]

## 2022-10-06 NOTE — ED ADULT NURSE NOTE - NSICDXPASTSURGICALHX_GEN_ALL_CORE_FT
PAST SURGICAL HISTORY:  H/O  section     History of bunionectomy right foot     History of total right hip replacement 2017 at [facility]    S/P excision of lipoma [phone]

## 2022-10-06 NOTE — ED PROVIDER NOTE - CLINICAL SUMMARY MEDICAL DECISION MAKING FREE TEXT BOX
61f cc abd pain pmh actively being treated for psueo given hx and physical concern for pe vs hernia vs pancreatitis will get labs and imaging to assess

## 2022-10-06 NOTE — ED ADULT NURSE NOTE - OBJECTIVE STATEMENT
60 y/o female BIBEMS from [facility] rehab complaining of periumbical abd pain x3 hours. Pt a&ox4, PMH HTN, prior psuedomonas infection receiving cefepime through L PICC. At this time, pt denies pain but reports pain was severe, to generalized lower abdomen, worse with exertion. Reports sob, O2 sat on arrival 88% on RA. Pt placed on 2L NC. Denies chest pain, fever, chills, vomiting, diarrhea. 20g IV placed to RAC. Labs obtained as ordered.

## 2022-10-07 RX ORDER — ALTEPLASE 100 MG
2 KIT INTRAVENOUS ONCE
Refills: 0 | Status: COMPLETED | OUTPATIENT
Start: 2022-10-07 | End: 2022-10-07

## 2022-10-07 RX ORDER — ALTEPLASE 100 MG
2 KIT INTRAVENOUS ONCE
Refills: 0 | Status: DISCONTINUED | OUTPATIENT
Start: 2022-10-07 | End: 2022-10-07

## 2022-10-07 RX ORDER — SODIUM CHLORIDE 9 MG/ML
10 INJECTION INTRAMUSCULAR; INTRAVENOUS; SUBCUTANEOUS
Refills: 0 | Status: DISCONTINUED | OUTPATIENT
Start: 2022-10-07 | End: 2022-10-15

## 2022-10-07 RX ORDER — ACETAMINOPHEN 500 MG
650 TABLET ORAL ONCE
Refills: 0 | Status: COMPLETED | OUTPATIENT
Start: 2022-10-07 | End: 2022-10-07

## 2022-10-07 RX ORDER — CHLORHEXIDINE GLUCONATE 213 G/1000ML
1 SOLUTION TOPICAL
Refills: 0 | Status: DISCONTINUED | OUTPATIENT
Start: 2022-10-07 | End: 2022-10-15

## 2022-10-07 RX ADMIN — CEFEPIME 100 MILLIGRAM(S): 1 INJECTION, POWDER, FOR SOLUTION INTRAMUSCULAR; INTRAVENOUS at 10:29

## 2022-10-07 RX ADMIN — Medication 650 MILLIGRAM(S): at 03:00

## 2022-10-07 RX ADMIN — Medication 325 MILLIGRAM(S): at 13:21

## 2022-10-07 RX ADMIN — Medication 50 MILLIGRAM(S): at 13:21

## 2022-10-07 RX ADMIN — CEFEPIME 100 MILLIGRAM(S): 1 INJECTION, POWDER, FOR SOLUTION INTRAMUSCULAR; INTRAVENOUS at 00:59

## 2022-10-07 RX ADMIN — CEFEPIME 100 MILLIGRAM(S): 1 INJECTION, POWDER, FOR SOLUTION INTRAMUSCULAR; INTRAVENOUS at 17:28

## 2022-10-07 RX ADMIN — Medication 20 MILLIGRAM(S): at 17:27

## 2022-10-07 RX ADMIN — Medication 100 MILLIGRAM(S): at 05:27

## 2022-10-07 RX ADMIN — Medication 650 MILLIGRAM(S): at 02:49

## 2022-10-07 RX ADMIN — ALTEPLASE 2 MILLIGRAM(S): KIT at 14:39

## 2022-10-07 RX ADMIN — Medication 20 MILLIGRAM(S): at 14:30

## 2022-10-07 RX ADMIN — ENOXAPARIN SODIUM 40 MILLIGRAM(S): 100 INJECTION SUBCUTANEOUS at 05:27

## 2022-10-07 RX ADMIN — SENNA PLUS 2 TABLET(S): 8.6 TABLET ORAL at 21:45

## 2022-10-07 RX ADMIN — Medication 100 MILLIGRAM(S): at 17:27

## 2022-10-07 RX ADMIN — LOSARTAN POTASSIUM 50 MILLIGRAM(S): 100 TABLET, FILM COATED ORAL at 10:28

## 2022-10-07 NOTE — CONSULT NOTE ADULT - CONSULT REASON
hypoxemia; asthma exacerbation; obesity -> obstructive sleep apnea hypoxemia; asthma; obesity -> obstructive sleep apnea; abnormal chest CT; cryptogenic organizing pneumonia

## 2022-10-07 NOTE — CHART NOTE - NSCHARTNOTEFT_GEN_A_CORE
Cathflo  administered and unsuccessful in blood return after 30 min. Later on tried again w/o 2nd attempt of cathflo blood returned  successfully obtained. OK to use PICC line.

## 2022-10-07 NOTE — PATIENT PROFILE ADULT - FALL HARM RISK - HARM RISK INTERVENTIONS

## 2022-10-07 NOTE — PATIENT PROFILE ADULT - DOMESTIC TRAVEL HIGH RISK QUESTION
Physician needs to amend form as physician is the person that filled out form the first time and the form is signed and approved  
No

## 2022-10-07 NOTE — CONSULT NOTE ADULT - ASSESSMENT
ASSESSMENT:    61 year old gentlewoman, lifelong non-smoker, with history of obesity related obstructive sleep apnea (non-adherent with prescribed CPAP) and mild asthma on albuterol as needed. The patient underwent a right hip replacement revision in May. Due to non-healing, she underwent implantation of a proximal femur hook plate, cables and an allograft femur strut in September. She has been on cefepime for ~ 5 weeks due to pseudomonas found on tissue culture. She was discharged to rehab but returned to the ER yesterday with abdominal discomfort due to a hernia that has been reduced. She has no abdominal pain at present. The patient has been feeling unwell for ~ 1 week. She has fatigue, malaise and weakness. She has been having low grade fevers associated with nighttime fevers and drenching sweats. She has been short of breath with a cough especially when inspiring. She has anterior chest pain exacerbated by cough and breathing. She has no chest congestion or wheeze. In the ER, the patient was found to be hypoxic.     CT scan ->  no evidence of pulmonary embolism - extensive bilateral peripheral infiltrates may be seen in the setting of Covid pneumonia - no acute pathology in the abdomen and pelvis - small fat-containing ventral hernias are appreciated    the patient has hypoxemia in the setting of fatigue, malaise and weakness, low grade fevers associated with nighttime fevers and drenching sweats, dyspnea/hypoxemia and a cough with pleuritic chest pain - CT scan has peripheral opacities in both the upper and lower lobes - it would be unusual for the patient to have developed bacterial pneumonia while on cefepime for a pseudomonal joint infection - suspect that the patient has cryptogenic organizing pneumonia rather than eosinophilic pneumonia which tends to be upper lobe predominant    PLAN/RECOMMENDATIONS:    oxygen supplementation to keep saturation greater than 92% - currently on a 3lpm nasal canula  observe off nebs  start solumedrol 20mg IVP q6h  continue cefepime  discontinue azithromycin  discontinue doxycycline if the urine Legionella antigen is negative - RVP is negative  robitussin DM/tessalon/hycodan at bedtime  diligent DVT prophylaxis    Thank you for the courtesy of this referral. Plan of care discussed with the patient at bedside     Chris Lomas MD, [facility]  [phone]  Pulmonary Medicine       ASSESSMENT:    61 year old gentlewoman, lifelong non-smoker, with history of obesity related obstructive sleep apnea (non-adherent with prescribed CPAP) and mild asthma on albuterol as needed. The patient underwent a right hip replacement revision in May. Due to non-healing, she underwent implantation of a proximal femur hook plate, cables and an allograft femur strut in September. She has been on cefepime for ~ 5 weeks due to pseudomonas found on tissue culture. She was discharged to rehab but returned to the ER yesterday with abdominal discomfort due to a hernia that has been reduced. She has no abdominal pain at present. The patient has been feeling unwell for ~ 1 week. She has fatigue, malaise and weakness. She has been having low grade fevers associated with nighttime fevers and drenching sweats. She has been short of breath with a cough especially when inspiring. She has anterior chest pain exacerbated by cough and breathing. She has no chest congestion or wheeze. In the ER, the patient was found to be hypoxic.     CT scan ->  no evidence of pulmonary embolism - extensive bilateral peripheral infiltrates may be seen in the setting of Covid pneumonia - no acute pathology in the abdomen and pelvis - small fat-containing ventral hernias are appreciated    the patient has hypoxemia in the setting of fatigue, malaise and weakness, low grade fevers associated with nighttime fevers and drenching sweats, dyspnea/hypoxemia and a cough with pleuritic chest pain - CT scan has peripheral opacities in both the upper and lower lobes - it would be unusual for the patient to have developed bacterial pneumonia while on cefepime for a pseudomonal joint infection - suspect that the patient has cryptogenic organizing pneumonia rather than eosinophilic pneumonia which tends to be upper lobe predominant    PLAN/RECOMMENDATIONS:    oxygen supplementation to keep saturation greater than 92% - currently on a 3lpm nasal canula  observe off nebs  start solumedrol 20mg IVP q6h  continue cefepime  discontinue azithromycin  discontinue doxycycline if the urine Legionella antigen is negative - RVP is negative  robitussin DM/tessalon/hycodan at bedtime  diligent DVT prophylaxis    Thank you for the courtesy of this referral. Plan of care discussed with the patient at bedside and with     The patient's respiratory status remains stable. Dr. Kirk Smith will be covering our service for the weekend. Please call [phone] with questions or clinical changes. Thank you.      Chris Lomas MD, [facility]  [phone]  Pulmonary Medicine

## 2022-10-07 NOTE — ED ADULT NURSE REASSESSMENT NOTE - NS ED NURSE REASSESS COMMENT FT1
Pt reports that right a/c IV is painful on ABX infusion and painful while flushing. Attempted to flush IV, resistance felt and pain noted by Pt. No swelling/ bruising noted but tender to palpation. IV removed. 20 gauge IV placed to right hand, flushes with ease, +blood return, abx infusing. Pt denies complaints at this time, call bell within reach, bed in lowest position, appropriate side rails up.

## 2022-10-07 NOTE — CONSULT NOTE ADULT - SUBJECTIVE AND OBJECTIVE BOX
[facility] - [facility] - CONSULT NOTE    HPI: 61 year old gentlewoman, lifelong non-smoker, with history of obesity related obstructive sleep apnea and asthma. The patient underwent a right hip replacement in May. She fell in August resulting in a femoral prosthesis fracture with non-union and underwent implantation of a proximal femur hook plate, cables and an allograft femur strut. She was discharged to rehab but returned to the ER with abdominal discomfort due to a hernia that has been reduced in the ER. She was noted to be hypoxic.     PMHX:  Asthma  Obesity  JOSE LUIS (obstructive sleep apnea)  Hypertension  Osteoarthritis  Migraine headaches    PSHX:  Bunionectomy  Lipoma resection  Total hip replacement - right - 2022  Repair of a femoral prosthetic fracture - 2022   section        FAMILY HISTORY:  mother - ovarian cancer - hypertension  father - epilepsy - arthritis   grandparent - diabetes mellitus    SOCIAL HISTORY:    Pulmonary Medications:       Antimicrobials:  azithromycin   Tablet 250 milliGRAM(s) Oral daily  cefepime   IVPB 2000 milliGRAM(s) IV Intermittent every 8 hours  doxycycline IVPB      doxycycline IVPB 100 milliGRAM(s) IV Intermittent once  doxycycline IVPB 100 milliGRAM(s) IV Intermittent every 12 hours      Cardiology:  losartan 50 milliGRAM(s) Oral daily      Other:  alteplase for catheter clearance 2 milliGRAM(s) Catheter once  enoxaparin Injectable 40 milliGRAM(s) SubCutaneous every 24 hours  ferrous    sulfate 325 milliGRAM(s) Oral daily  senna 2 Tablet(s) Oral at bedtime  topiramate 50 milliGRAM(s) Oral daily      Prn:  MEDICATIONS  (PRN):  oxyCODONE    IR 5 milliGRAM(s) Oral every 4 hours PRN Severe Pain (7 - 10)  polyethylene glycol 3350 17 Gram(s) Oral daily PRN Constipation    Allergies    No Known Allergies    Intolerances    opioid-like analgesics (Vomiting; Nausea)      HOME MEDICATIONS: see  H & P    REVIEW OF SYSTEMS:  Constitutional: As per HPI  HEENT: Within normal limits  CV: As per HPI  Resp: As per HPI  GI: Within normal limits   : Within normal limits  Musculoskeletal: right femoral fracture and repair  Skin: Within normal limits  Neurological: Within normal limits  Psychiatric: Within normal limits  Endocrine: Within normal limits  Hematologic/Lymphatic: Within normal limits  Allergic/Immunologic: Within normal limits    [x] All other systems negative    OBJECTIVE:    Daily Height in cm: 165.1 (06 Oct 2022 15:35)      PHYSICAL EXAM:  ICU Vital Signs Last 24 Hrs  T(C): 37.5 (07 Oct 2022 09:15), Max: 38.1 (07 Oct 2022 02:03)  T(F): 99.5 (07 Oct 2022 09:15), Max: 100.5 (07 Oct 2022 02:03)  HR: 86 (07 Oct 2022 09:15) (74 - 94)  BP: 126/78 (07 Oct 2022 10:22) (113/71 - 140/91)  BP(mean): 99 (06 Oct 2022 19:51) (99 - 99)  ABP: --  ABP(mean): --  RR: 18 (07 Oct 2022 09:15) (16 - 20)  SpO2: 96% (07 Oct 2022 09:15) (95% - 97%) on 2lpm nasal canula    General: Awake. Alert. Cooperative. No distress. Appears stated age 	  HEENT:   Atraumatic. Normocephalic. Anicteric. Normal oral mucosa. PERRL. EOMI.  Neck: Supple. Trachea midline. Thyroid without enlargement/tenderness/nodules. No carotid bruit. No JVD.	  Cardiovascular: Regular rate and rhythm. S1 S2 normal. No murmurs, rubs or gallops.  Respiratory: Respirations unlabored. Clear to auscultation and percussion bilaterally. No curvature.  Abdomen: Soft. Non-tender. Non-distended. No organomegaly. No masses. Normal bowel sounds.  Extremities: Warm to touch. No clubbing or cyanosis. No pedal edema.  Pulses: 2+ peripheral pulses all extremities.	  Skin: Normal skin color. No rashes or lesions. No ecchymoses. No cyanosis. Warm to touch.  Lymph Nodes: Cervical, supraclavicular and axillary nodes normal  Neurological: Motor and sensory examination equal and normal. A and O x 3  Psychiatry: Appropriate mood and affect.      LABS:                          9.8    14.58 )-----------( 663      ( 06 Oct 2022 17:18 )             31.4     CBC    WBC  14.58 <==    Hemoglobin  9.8 <<==    Hematocrit  31.4 <==    Platelets  663 <==      135  |  102  |  11  ----------------------------<  114<H>    10-06  4.7   |  21<L>  |  0.59    LYTES    sodium  135 <==    potassium   4.7 <==    chloride  102 <==    carbon dioxide  21 <==    =============================================================================================  RENAL FUNCTION:    Creatinine:   0.59  <<==    BUN:   11 <==    ============================================================================================    calcium   9.7 <==    ============================================================================================  LFTs    AST:   14 <==     ALT:  13  <==     AP:  84  <=    Bili:  0.2  <=    Venous Blood Gas:  10-06 @ 17:15  7.33/43/25/23/34.2  VBG Lactate: 1.6    Serum Pro-Brain Natriuretic Peptide: 48 pg/mL (10-06 @ 17:18)    CARDIAC MARKERS ( 06 Oct 2022 17:18 )  CPK x     /CKMB x     /CKMB Units x        troponin 25 ng/L        MICROBIOLOGY:     Respiratory Viral Panel with COVID-19 by TARA (10.06.22 @ 17:24)   Rapid RVP Result: NotDetec   SARS-CoV-2: NotDetec  This Respiratory Panel uses polymerase chain reaction (PCR) to detect for   adenovirus; coronavirus (HKU1, NL63, 229E, OC43); human metapneumovirus   (hMPV); human enterovirus/rhinovirus (Entero/RV); influenza A; influenza   A/H1; influenza A/H3; influenza A/H1-2009; influenza B; parainfluenza   viruses 1, 2, 3, 4; respiratory syncytial virus; Mycoplasma pneumoniae;   Chlamydophila pneumoniae; and SARS-CoV-2.     Urinalysis Basic - ( 06 Oct 2022 18:03 )    Color: Light Yellow / Appearance: Clear / S.010 / pH: x  Gluc: x / Ketone: Negative  / Bili: Negative / Urobili: Negative   Blood: x / Protein: Trace / Nitrite: Negative   Leuk Esterase: Negative / RBC: 1 /hpf / WBC 3 /HPF   Sq Epi: x / Non Sq Epi: 6 /hpf / Bacteria: Negative    Culture - Tissue with Gram Stain (22 @ 08:59)   - Piperacillin/Tazobactam: S <=8   - Tobramycin: S <=2   Gram Stain:   No polymorphonuclear leukocytes per low power field   No organisms seen per oil power field   - Amikacin: S <=16   - Aztreonam: S <=4   - Cefepime: S <=2   - Ceftazidime: S <=1   - Ciprofloxacin: S <=0.25   - Gentamicin: S <=2   - Imipenem: S <=1   - Levofloxacin: S <=0.5   - Meropenem: S <=1   Specimen Source: .Tissue Right hip Bone Culture   Culture Results:   Rare Pseudomonas aeruginosa   Organism Identification: Pseudomonas aeruginosa   Organism: Pseudomonas aeruginosa   Method Type: PB       Historical Values  Culture - Tissue with Gram Stain (22 @ 08:59)   - Piperacillin/Tazobactam: S <=8   - Tobramycin: S <=2   Gram Stain:   No polymorphonuclear leukocytes per low power field   No organisms seen per oil power field   - Amikacin: S <=16   - Aztreonam: S <=4   - Cefepime: S <=2   - Ceftazidime: S <=1   - Ciprofloxacin: S <=0.25   - Gentamicin: S <=2   - Imipenem: S <=1   - Levofloxacin: S <=0.5   - Meropenem: S <=1   Specimen Source: .Tissue Right hip Bone Culture   Culture Results:   Rare Pseudomonas aeruginosa   Organism Identification: Pseudomonas aeruginosa     RADIOLOGY:  [x ] Chest radiographs reviewed and interpreted by me    EXAM:  XR CHEST PA LAT 2V                          PROCEDURE DATE:  10/06/2022      FINDINGS:  Scattered bilateral opacities may represent multifocal pneumonia in the   appropriate clinical setting. Small left pleural effusion with associated   basilar atelectasis.  The heart is normal in size.  There is no pneumothorax.    IMPRESSION:  Scattered bilateral opacities may represent multifocal pneumonia in the   appropriate clinical setting. Small left pleural effusion with associated   basilar atelectasis.    ELLYN JOSHI MD; Resident Radiologist  This document has been electronically signed.  SOFI ZHENG MD; Attending Radiologist  This document has been electronically signed. Oct  7 2022  8:30AM  ---------------------------------------------------------------------------------------------------------------  EXAM:  CT ABDOMEN AND PELVIS IC                        [facility]                          PROCEDURE DATE:  10/06/2022      FINDINGS:  CHEST:  LUNGS AND LARGE AIRWAYS: Patent central airways. Multiple bilateral   peripheral infiltrates suggestive of Covid pneumonia. Please correlate   with Covid status.  PLEURA: No pleural effusion.  VESSELS: Left-sided PICC line is seen with the tip in the superior vena   cava  HEART: Heart size is normal. No pericardial effusion.  MEDIASTINUM AND DANA: No lymphadenopathy.  CHEST WALL AND LOWER NECK: Within normal limits.    ABDOMEN AND PELVIS:  LIVER: Within normal limits.  BILE DUCTS: Normal caliber.  GALLBLADDER: Within normal limits.  SPLEEN: Within normal limits.  PANCREAS: Within normal limits.  ADRENALS: Within normal limits.  KIDNEYS/URETERS: Within normal limits.    BLADDER: Within normal limits.  REPRODUCTIVE ORGANS: Uterus and adnexa within normal limits.    BOWEL: No bowel obstruction. Appendix is not visualized. No evidence of   inflammation in the pericecal region.  PERITONEUM: No ascites.  VESSELS: Atherosclerotic changes.  RETROPERITONEUM/LYMPH NODES: No lymphadenopathy.  ABDOMINAL WALL: Supraumbilical fat-containing hernia to left of midline.   Small fat-containing umbilical hernia also seen.  BONES: Degenerative changes.    IMPRESSION: No evidence of pulmonary embolism.  Extensive bilateral peripheral infiltrates may be seen in the setting of   Covid pneumonia. Please correlate with Covid testing.  No acute pathology in the abdomen and pelvis small fat-containing ventral   hernias are appreciated    HEAVEN PADGETT MD; Attending Radiologist  This document has been electronically signed. Oct  6 2022  6:52PM  ---------------------------------------------------------------------------------------------------------------           [facility] - [facility] - CONSULT NOTE    HPI: 61 year old gentlewoman, lifelong non-smoker, with history of obesity related obstructive sleep apnea (non-adherent with prescribed CPAP) and mild asthma on albuterol as needed. The patient underwent a right hip replacement revision in May. Due to non-healing, she underwent implantation of a proximal femur hook plate, cables and an allograft femur strut in September. She has been on cefepime for ~ 5 weeks due to pseudomonas found on tissue culture. She was discharged to rehab but returned to the ER with abdominal discomfort due to a hernia that has been reduced in the ER. She has no abdominal pain at present. The patient has been feeling unwell for ~ 1 week. She has fatigue, malaise and weakness. She has been having low grade fevers associated with nighttime fevers and drenching sweats. She has been short of breath with a cough especially when inspiring. She has anterior chest pain exacerbated by cough and breathing. She has no chest congestion or wheeze. In the ER, the patient was found to be hypoxic. Chest radiographs are abnormal. Asked to evaluate.     PMHX:  Asthma  Obesity  JOSE LUIS (obstructive sleep apnea)  Hypertension  Osteoarthritis  Migraine headaches    PSHX:  Bunionectomy  Lipoma resection  Total hip replacement - right - 2017  Repair of a femoral prosthetic fracture - 2022 and 2022   section    FAMILY HISTORY:  mother - ovarian cancer - hypertension  father - epilepsy - arthritis   grandparent - diabetes mellitus    SOCIAL HISTORY:  lifelong non-smoker    Pulmonary Medications:       Antimicrobials:  azithromycin   Tablet 250 milliGRAM(s) Oral daily  cefepime   IVPB 2000 milliGRAM(s) IV Intermittent every 8 hours  doxycycline IVPB      doxycycline IVPB 100 milliGRAM(s) IV Intermittent once  doxycycline IVPB 100 milliGRAM(s) IV Intermittent every 12 hours      Cardiology:  losartan 50 milliGRAM(s) Oral daily      Other:  alteplase for catheter clearance 2 milliGRAM(s) Catheter once  enoxaparin Injectable 40 milliGRAM(s) SubCutaneous every 24 hours  ferrous    sulfate 325 milliGRAM(s) Oral daily  senna 2 Tablet(s) Oral at bedtime  topiramate 50 milliGRAM(s) Oral daily      Prn:  MEDICATIONS  (PRN):  oxyCODONE    IR 5 milliGRAM(s) Oral every 4 hours PRN Severe Pain (7 - 10)  polyethylene glycol 3350 17 Gram(s) Oral daily PRN Constipation    Allergies    No Known Allergies    Intolerances    opioid-like analgesics (Vomiting; Nausea)      HOME MEDICATIONS: see  H & P    REVIEW OF SYSTEMS:  Constitutional: As per HPI  HEENT: Within normal limits  CV: As per HPI  Resp: As per HPI  GI: Within normal limits   : Within normal limits  Musculoskeletal: right femoral fracture and repair  Skin: Within normal limits  Neurological: Within normal limits  Psychiatric: Within normal limits  Endocrine: Within normal limits  Hematologic/Lymphatic: Within normal limits  Allergic/Immunologic: Within normal limits    [x] All other systems negative    OBJECTIVE:    Daily Height in cm: 165.1 (06 Oct 2022 15:35)      PHYSICAL EXAM:  ICU Vital Signs Last 24 Hrs  T(C): 37.5 (07 Oct 2022 09:15), Max: 38.1 (07 Oct 2022 02:03)  T(F): 99.5 (07 Oct 2022 09:15), Max: 100.5 (07 Oct 2022 02:03)  HR: 86 (07 Oct 2022 09:15) (74 - 94)  BP: 126/78 (07 Oct 2022 10:22) (113/71 - 140/91)  BP(mean): 99 (06 Oct 2022 19:51) (99 - 99)  ABP: --  ABP(mean): --  RR: 18 (07 Oct 2022 09:15) (16 - 20)  SpO2: 96% (07 Oct 2022 09:15) (95% - 97%) on 2lpm nasal canula    General: Awake. Alert. Cooperative. No distress. Appears stated age 	  HEENT: Atraumatic. Normocephalic. Anicteric. Normal oral mucosa. PERRL. EOMI. Mallampati class IV airway  Neck: Supple. Trachea midline. Thyroid without enlargement/tenderness/nodules. No carotid bruit. No JVD. Short and wide  Cardiovascular: Regular rate and rhythm. S1 S2 normal. No murmurs, rubs or gallops.  Respiratory: Respirations unlabored. Diffuse rales. No curvature.  Abdomen: Soft. Non-tender. Non-distended. No organomegaly. No masses. Normal bowel sounds.  Extremities: Warm to touch. No clubbing or cyanosis. No pedal edema.  Pulses: 2+ peripheral pulses all extremities.	  Skin: Normal skin color. No rashes or lesions. No ecchymoses. No cyanosis. Warm to touch.  Lymph Nodes: Cervical, supraclavicular and axillary nodes normal  Neurological: Motor and sensory examination equal and normal. A and O x 3  Psychiatry: Appropriate mood and affect.      LABS:                          9.8    14.58 )-----------( 663      ( 06 Oct 2022 17:18 )             31.4     CBC    WBC  14.58 <==    Hemoglobin  9.8 <<==    Hematocrit  31.4 <==    Platelets  663 <==      135  |  102  |  11  ----------------------------<  114<H>    10-06  4.7   |  21<L>  |  0.59    LYTES    sodium  135 <==    potassium   4.7 <==    chloride  102 <==    carbon dioxide  21 <==    =============================================================================================  RENAL FUNCTION:    Creatinine:   0.59  <<==    BUN:   11 <==    ============================================================================================    calcium   9.7 <==    ============================================================================================  LFTs    AST:   14 <==     ALT:  13  <==     AP:  84  <=    Bili:  0.2  <=    Venous Blood Gas:  10-06 @ 17:15  7.33/43/25/23/34.2  VBG Lactate: 1.6    Serum Pro-Brain Natriuretic Peptide: 48 pg/mL (10-06 @ 17:18)    CARDIAC MARKERS ( 06 Oct 2022 17:18 )  CPK x     /CKMB x     /CKMB Units x        troponin 25 ng/L    MICROBIOLOGY:     Respiratory Viral Panel with COVID-19 by TARA (10.06.22 @ 17:24)   Rapid RVP Result: [facility]   SARS-CoV-2: [facility]  This Respiratory Panel uses polymerase chain reaction (PCR) to detect for adenovirus; coronavirus (HKU1, NL63, 229E, OC43); human metapneumovirus (hMPV); human enterovirus/rhinovirus (Entero/RV); influenza A; influenza A/H1; influenza A/H3; influenza A/H1-2009; influenza B; parainfluenza viruses 1, 2, 3, 4; respiratory syncytial virus; Mycoplasma pneumoniae; Chlamydophila pneumoniae; and SARS-CoV-2.     Urinalysis Basic - ( 06 Oct 2022 18:03 )    Color: Light Yellow / Appearance: Clear / S.010 / pH: x  Gluc: x / Ketone: Negative  / Bili: Negative / Urobili: Negative   Blood: x / Protein: Trace / Nitrite: Negative   Leuk Esterase: Negative / RBC: 1 /hpf / WBC 3 /HPF   Sq Epi: x / Non Sq Epi: 6 /hpf / Bacteria: Negative    Culture - Tissue with Gram Stain (22 @ 08:59)   - Piperacillin/Tazobactam: S <=8   - Tobramycin: S <=2   Gram Stain:   No polymorphonuclear leukocytes per low power field   No organisms seen per oil power field   - Amikacin: S <=16   - Aztreonam: S <=4   - Cefepime: S <=2   - Ceftazidime: S <=1   - Ciprofloxacin: S <=0.25   - Gentamicin: S <=2   - Imipenem: S <=1   - Levofloxacin: S <=0.5   - Meropenem: S <=1   Specimen Source: .Tissue Right hip Bone Culture   Culture Results:   Rare Pseudomonas aeruginosa   Organism Identification: Pseudomonas aeruginosa   Organism: Pseudomonas aeruginosa   Method Type: PB     RADIOLOGY:  [x ] Chest radiographs reviewed and interpreted by me    EXAM:  XR CHEST PA LAT 2V                          PROCEDURE DATE:  10/06/2022      FINDINGS:  Scattered bilateral opacities may represent multifocal pneumonia in the appropriate clinical setting. Small left pleural effusion with associated basilar atelectasis.  The heart is normal in size.  There is no pneumothorax.    IMPRESSION:  Scattered bilateral opacities may represent multifocal pneumonia in the appropriate clinical setting. Small left pleural effusion with associated   basilar atelectasis.    ELLYN JOSHI MD; Resident Radiologist  This document has been electronically signed.  SOFI ZHENG MD; Attending Radiologist  This document has been electronically signed. Oct  7 2022  8:30AM  ---------------------------------------------------------------------------------------------------------------  EXAM:  CT ABDOMEN AND PELVIS IC                        [facility]  [facility]                          PROCEDURE DATE:  10/06/2022      FINDINGS:  CHEST:  LUNGS AND LARGE AIRWAYS: Patent central airways. Multiple bilateral peripheral infiltrates suggestive of Covid pneumonia. Please correlate with Covid status.  PLEURA: No pleural effusion.  VESSELS: Left-sided PICC line is seen with the tip in the superior vena cava  HEART: Heart size is normal. No pericardial effusion.  MEDIASTINUM AND DANA: No lymphadenopathy.  CHEST WALL AND LOWER NECK: Within normal limits.    ABDOMEN AND PELVIS:  LIVER: Within normal limits.  BILE DUCTS: Normal caliber.  GALLBLADDER: Within normal limits.  SPLEEN: Within normal limits.  PANCREAS: Within normal limits.  ADRENALS: Within normal limits.  KIDNEYS/URETERS: Within normal limits.    BLADDER: Within normal limits.  REPRODUCTIVE ORGANS: Uterus and adnexa within normal limits.    BOWEL: No bowel obstruction. Appendix is not visualized. No evidence of   inflammation in the pericecal region.  PERITONEUM: No ascites.  VESSELS: Atherosclerotic changes.  RETROPERITONEUM/LYMPH NODES: No lymphadenopathy.  ABDOMINAL WALL: Supraumbilical fat-containing hernia to left of midline.   Small fat-containing umbilical hernia also seen.  BONES: Degenerative changes.    IMPRESSION: No evidence of pulmonary embolism.  Extensive bilateral peripheral infiltrates may be seen in the setting of Covid pneumonia. Please correlate with Covid testing.  No acute pathology in the abdomen and pelvis small fat-containing ventral hernias are appreciated    HEAVEN PADGETT MD; Attending Radiologist  This document has been electronically signed. Oct  6 2022  6:52PM  ---------------------------------------------------------------------------------------------------------------

## 2022-10-07 NOTE — PROGRESS NOTE ADULT - SUBJECTIVE AND OBJECTIVE BOX
Patient is a 61y old  Female who presents with a chief complaint of abdominal wall hernia non-reducible, nausea (06 Oct 2022 18:32)      INTERVAL HPI/OVERNIGHT EVENTS: No event overnight. Vitals stable. On nasal cannula 3 L. Patient spiked temp 105 at 2 AM, afebrile today. Labs reviewed, WBC elevated 14,000 HGb stable 9.8. The rest of labs ok. CXR showed scattered bilateral opacity present multifocal PNA with small L pleural effusion. Continue IV Doxycycline and Zithromycin. F/u Legionella, Streptococ and MRSA swab. F/u pulmonologist Dr. Lomas.     Pain Location & Control: OK    MEDICATIONS  (STANDING):  azithromycin   Tablet 250 milliGRAM(s) Oral daily  cefepime   IVPB 2000 milliGRAM(s) IV Intermittent every 8 hours  doxycycline IVPB      doxycycline IVPB 100 milliGRAM(s) IV Intermittent once  doxycycline IVPB 100 milliGRAM(s) IV Intermittent every 12 hours  enoxaparin Injectable 40 milliGRAM(s) SubCutaneous every 24 hours  ferrous    sulfate 325 milliGRAM(s) Oral daily  losartan 50 milliGRAM(s) Oral daily  senna 2 Tablet(s) Oral at bedtime  topiramate 50 milliGRAM(s) Oral daily    MEDICATIONS  (PRN):  oxyCODONE    IR 5 milliGRAM(s) Oral every 4 hours PRN Severe Pain (7 - 10)  polyethylene glycol 3350 17 Gram(s) Oral daily PRN Constipation      Allergies    No Known Allergies    Intolerances    opioid-like analgesics (Vomiting; Nausea)      REVIEW OF SYSTEMS:  CONSTITUTIONAL: No fever, weight loss, or fatigue  EYES: No eye pain, visual disturbances, or discharge  ENMT:  No difficulty hearing, tinnitus, vertigo; No sinus or throat pain  NECK: No pain or stiffness  BREASTS: No pain, masses, or nipple discharge  RESPIRATORY: No cough, wheezing, chills or hemoptysis; + shortness of breath  CARDIOVASCULAR: No chest pain, palpitations, dizziness, or leg swelling  GASTROINTESTINAL: No abdominal or epigastric pain. No nausea, vomiting, or hematemesis; No diarrhea or constipation. No melena or hematochezia.  GENITOURINARY: No dysuria, frequency, hematuria, or incontinence  NEUROLOGICAL: No headaches, memory loss, loss of strength, numbness, or tremors  SKIN: No itching, burning, rashes, or lesions   LYMPH NODES: No enlarged glands  ENDOCRINE: No heat or cold intolerance; No hair loss; No polydipsia or polyuria  MUSCULOSKELETAL: No back pain  PSYCHIATRIC: No depression, anxiety, mood swings, or difficulty sleeping  HEME/LYMPH: No easy bruising, or bleeding gums  ALLERGY AND IMMUNOLOGIC: No hives or eczema    Vital Signs Last 24 Hrs  T(C): 37.5 (07 Oct 2022 09:15), Max: 38.1 (07 Oct 2022 02:03)  T(F): 99.5 (07 Oct 2022 09:15), Max: 100.5 (07 Oct 2022 02:03)  HR: 86 (07 Oct 2022 09:15) (74 - 94)  BP: 126/78 (07 Oct 2022 10:22) (113/71 - 140/91)  BP(mean): 99 (06 Oct 2022 19:51) (99 - 99)  RR: 18 (07 Oct 2022 09:15) (16 - 20)  SpO2: 96% (07 Oct 2022 09:15) (95% - 97%)    Parameters below as of 07 Oct 2022 09:15  Patient On (Oxygen Delivery Method): nasal cannula  O2 Flow (L/min): 3      PHYSICAL EXAM:  GENERAL: NAD, well-groomed, well-developed  HEAD:  Atraumatic, Normocephalic  EYES: EOMI, PERRLA, conjunctiva and sclera clear  ENMT: No tonsillar erythema, exudates, or enlargement; Moist mucous membranes, Good dentition, No lesions  NECK: Supple, No JVD, Normal thyroid  NERVOUS SYSTEM:  Alert & Oriented X3, Good concentration; Motor Strength 5/5 B/L upper and lower extremities; DTRs 2+ intact and symmetric  CHEST/LUNG: Clear to auscultation bilaterally; No rales, rhonchi, wheezing, or rubs  HEART: Regular rate and rhythm; No murmurs, rubs, or gallops  ABDOMEN: Soft, Nontender, Nondistended; Bowel sounds present  EXTREMITIES:  2+ Peripheral Pulses, No clubbing or cyanosis  LYMPH: No lymphadenopathy noted  SKIN: R hip surgical wound healing no signs of infection     LABS:                        9.8    14.58 )-----------( 663      ( 06 Oct 2022 17:18 )             31.4     06 Oct 2022 17:18    135    |  102    |  11     ----------------------------<  114    4.7     |  21     |  0.59     Ca    9.7        06 Oct 2022 17:18    TPro  7.6    /  Alb  3.6    /  TBili  0.2    /  DBili  x      /  AST  14     /  ALT  13     /  AlkPhos  84     06 Oct 2022 17:18      Urinalysis Basic - ( 06 Oct 2022 18:03 )    Color: Light Yellow / Appearance: Clear / S.010 / pH: x  Gluc: x / Ketone: Negative  / Bili: Negative / Urobili: Negative   Blood: x / Protein: Trace / Nitrite: Negative   Leuk Esterase: Negative / RBC: 1 /hpf / WBC 3 /HPF   Sq Epi: x / Non Sq Epi: 6 /hpf / Bacteria: Negative      CAPILLARY BLOOD GLUCOSE            Cultures      RADIOLOGY & ADDITIONAL TESTS:    Imaging Personally Reviewed:  [X ] YES  [ ] NO    Consultant(s) Notes Reviewed:  [X ] YES  [ ] NO    Care Discussed with Consultants/Other Providers [X ] YES  [ ] NO Patient is a 61y old  Female who presents with a chief complaint of abdominal wall hernia non-reducible, nausea (06 Oct 2022 18:32)      INTERVAL HPI/OVERNIGHT EVENTS: No event overnight. Vitals stable. On nasal cannula 3 L. Patient spiked temp 105 at 2 AM, afebrile today. Labs reviewed, WBC elevated 14,000 HGb stable 9.8. The rest of labs ok. CXR showed scattered bilateral opacity present multifocal PNA with small L pleural effusion.  F/u Legionella, Streptococ and MRSA swab. F/u pulmonologist Dr. Lomas.  Bacterial PNA ruled out by pulmonologist, most likely cryptogenic organizing pneumonia treated with IV steroid, bronchodilator.  Discontinue Doxycycline and Zithromycin.     Pain Location & Control: OK    MEDICATIONS  (STANDING):  azithromycin   Tablet 250 milliGRAM(s) Oral daily  cefepime   IVPB 2000 milliGRAM(s) IV Intermittent every 8 hours  doxycycline IVPB      doxycycline IVPB 100 milliGRAM(s) IV Intermittent once  doxycycline IVPB 100 milliGRAM(s) IV Intermittent every 12 hours  enoxaparin Injectable 40 milliGRAM(s) SubCutaneous every 24 hours  ferrous    sulfate 325 milliGRAM(s) Oral daily  losartan 50 milliGRAM(s) Oral daily  senna 2 Tablet(s) Oral at bedtime  topiramate 50 milliGRAM(s) Oral daily    MEDICATIONS  (PRN):  oxyCODONE    IR 5 milliGRAM(s) Oral every 4 hours PRN Severe Pain (7 - 10)  polyethylene glycol 3350 17 Gram(s) Oral daily PRN Constipation      Allergies    No Known Allergies    Intolerances    opioid-like analgesics (Vomiting; Nausea)      REVIEW OF SYSTEMS:  CONSTITUTIONAL: No fever, weight loss, or fatigue  EYES: No eye pain, visual disturbances, or discharge  ENMT:  No difficulty hearing, tinnitus, vertigo; No sinus or throat pain  NECK: No pain or stiffness  BREASTS: No pain, masses, or nipple discharge  RESPIRATORY: No cough, wheezing, chills or hemoptysis; + shortness of breath  CARDIOVASCULAR: No chest pain, palpitations, dizziness, or leg swelling  GASTROINTESTINAL: No abdominal or epigastric pain. No nausea, vomiting, or hematemesis; No diarrhea or constipation. No melena or hematochezia.  GENITOURINARY: No dysuria, frequency, hematuria, or incontinence  NEUROLOGICAL: No headaches, memory loss, loss of strength, numbness, or tremors  SKIN: No itching, burning, rashes, or lesions   LYMPH NODES: No enlarged glands  ENDOCRINE: No heat or cold intolerance; No hair loss; No polydipsia or polyuria  MUSCULOSKELETAL: No back pain  PSYCHIATRIC: No depression, anxiety, mood swings, or difficulty sleeping  HEME/LYMPH: No easy bruising, or bleeding gums  ALLERGY AND IMMUNOLOGIC: No hives or eczema    Vital Signs Last 24 Hrs  T(C): 37.5 (07 Oct 2022 09:15), Max: 38.1 (07 Oct 2022 02:03)  T(F): 99.5 (07 Oct 2022 09:15), Max: 100.5 (07 Oct 2022 02:03)  HR: 86 (07 Oct 2022 09:15) (74 - 94)  BP: 126/78 (07 Oct 2022 10:22) (113/71 - 140/91)  BP(mean): 99 (06 Oct 2022 19:51) (99 - 99)  RR: 18 (07 Oct 2022 09:15) (16 - 20)  SpO2: 96% (07 Oct 2022 09:15) (95% - 97%)    Parameters below as of 07 Oct 2022 09:15  Patient On (Oxygen Delivery Method): nasal cannula  O2 Flow (L/min): 3      PHYSICAL EXAM:  GENERAL: NAD, well-groomed, well-developed  HEAD:  Atraumatic, Normocephalic  EYES: EOMI, PERRLA, conjunctiva and sclera clear  ENMT: No tonsillar erythema, exudates, or enlargement; Moist mucous membranes, Good dentition, No lesions  NECK: Supple, No JVD, Normal thyroid  NERVOUS SYSTEM:  Alert & Oriented X3, Good concentration; Motor Strength 5/5 B/L upper and lower extremities; DTRs 2+ intact and symmetric  CHEST/LUNG: Clear to auscultation bilaterally; No rales, rhonchi, wheezing, or rubs  HEART: Regular rate and rhythm; No murmurs, rubs, or gallops  ABDOMEN: Soft, Nontender, Nondistended; Bowel sounds present  EXTREMITIES:  2+ Peripheral Pulses, No clubbing or cyanosis  LYMPH: No lymphadenopathy noted  SKIN: R hip surgical wound healing no signs of infection     LABS:                        9.8    14.58 )-----------( 663      ( 06 Oct 2022 17:18 )             31.4     06 Oct 2022 17:18    135    |  102    |  11     ----------------------------<  114    4.7     |  21     |  0.59     Ca    9.7        06 Oct 2022 17:18    TPro  7.6    /  Alb  3.6    /  TBili  0.2    /  DBili  x      /  AST  14     /  ALT  13     /  AlkPhos  84     06 Oct 2022 17:18      Urinalysis Basic - ( 06 Oct 2022 18:03 )    Color: Light Yellow / Appearance: Clear / S.010 / pH: x  Gluc: x / Ketone: Negative  / Bili: Negative / Urobili: Negative   Blood: x / Protein: Trace / Nitrite: Negative   Leuk Esterase: Negative / RBC: 1 /hpf / WBC 3 /HPF   Sq Epi: x / Non Sq Epi: 6 /hpf / Bacteria: Negative      CAPILLARY BLOOD GLUCOSE            Cultures      RADIOLOGY & ADDITIONAL TESTS:    Imaging Personally Reviewed:  [X ] YES  [ ] NO    Consultant(s) Notes Reviewed:  [X ] YES  [ ] NO    Care Discussed with Consultants/Other Providers [X ] YES  [ ] NO

## 2022-10-07 NOTE — PROGRESS NOTE ADULT - ASSESSMENT
62 y/o female medical history sig for Asthma, obesity, JOSE LUIS not on NIV, Migraines, HTN who was accepted for transfer from [facility] by Dr. Byrnes for evaluation of previous right THR on 5/2/22. Patient was admitted to [facility] on 8/27 s/p fall and found to have fractured femoral prosthesis/ non union and may need bone graft. She denies any LOC or head trauma from fall, describes fall as leg giving out. She was seen by Cardiology prior to transfer and deemed stable for surgery. Denies any tori-operative issues in 5/22 during last revision.    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 WBAT. PT/ OT. Ortho following . OR Cultures PRELIM rare Pseudomonas. ID consulted and Cefepime started antibiotics through 10/28/22 for total 8 weeks. Will need suppression thereafter.  HTN-  Losartan and hold HCTZ due to soft BP.  Migraine - Cont. Topamax  JOSE LUIS- Not on NIV HS. O/P pulm f/u  Asthma- PRN nebs  DVT Prophylaxis -- Lovenox SQ   Abdominal hernia - reduced, asymptomatic.

## 2022-10-07 NOTE — PATIENT PROFILE ADULT - PATIENT'S PREFERRED PRONOUN
VIDEO ELECTROENCEPHALOGRAM REPORT      Referring MD: Dr. Vazquez.     DOS:  8/23/2017 (total recording of 41 minutes).     INDICATION:  Tiffany Gottlieb 41 y.o. female presenting with possible seizure.     CURRENT ANTIEPILEPTIC REGIMEN: None.     TECHNIQUE: 30 channel video electroencephalogram (EEG) was performed in accordance with the international 10-20 system. The study was reviewed in bipolar and referential montages. The recording examined the patient during wakeful and drowsy state(s).     DESCRIPTION OF THE RECORD:  During the wakefulness, the background showed a symmetrical 12 Hz alpha activity posteriorly with amplitude of 70 mV.  There was reactivity to eye closure/opening.  A normal anterior-posterior gradient was noted with faster beta frequencies seen anteriorly.  During drowsiness, theta/delta frequencies were seen.    ACTIVATION PROCEDURES:   Hyperventilation was performed by the patient for a total of 3 minutes. The technician performing the test noted good effort. No significant background changes were noted.     Intermittent Photic stimulation was performed in a stepwise fashion from 1 to 30 Hz and elicited a normal response (photic driving), most noticeable in the posterior leads.      ICTAL AND/OR INTERICTAL FINDINGS:   No focal or generalized epileptiform activity noted. No regional slowing was seen during this routine study.  No clinical events or seizures were reported or recorded during the study.     EKG: sampling of the EKG recording demonstrated sinus rhythm.       INTERPRETATION:  This is a normal video EEG recording in the awake and drowsy state(s).  Clinical correlation is recommended.    Note: A normal EEG does not rule out epilepsy.  If the clinical suspicion remains high for seizures, a prolonged recording to capture clinical or subclinical events may be helpful.        Sonny Carter MD  Medical Director, Epilepsy and Neurodiagnostics.   Clinical  of Neurology  [facility].   Diplomate in Neurology, Epilepsy, and Electrodiagnostic Medicine.   Office: [phone]  Fax: [phone]     Her/She

## 2022-10-07 NOTE — PATIENT PROFILE ADULT - FALL HARM RISK - DATE OF FALL
26-Aug-2022 Mart-1 - Positive Histology Text: MART-1 staining demonstrates areas of higher density and clustering of melanocytes with Pagetoid spread upwards within the epidermis. The surgical margins are positive for tumor cells.

## 2022-10-08 LAB
ALBUMIN SERPL ELPH-MCNC: 3.5 G/DL — SIGNIFICANT CHANGE UP (ref 3.3–5)
ALP SERPL-CCNC: 81 U/L — SIGNIFICANT CHANGE UP (ref 40–120)
ALT FLD-CCNC: 15 U/L — SIGNIFICANT CHANGE UP (ref 10–45)
ANION GAP SERPL CALC-SCNC: 11 MMOL/L — SIGNIFICANT CHANGE UP (ref 5–17)
AST SERPL-CCNC: 7 U/L — LOW (ref 10–40)
BILIRUB SERPL-MCNC: 0.1 MG/DL — LOW (ref 0.2–1.2)
BUN SERPL-MCNC: 15 MG/DL — SIGNIFICANT CHANGE UP (ref 7–23)
CALCIUM SERPL-MCNC: 10 MG/DL — SIGNIFICANT CHANGE UP (ref 8.4–10.5)
CHLORIDE SERPL-SCNC: 104 MMOL/L — SIGNIFICANT CHANGE UP (ref 96–108)
CO2 SERPL-SCNC: 21 MMOL/L — LOW (ref 22–31)
CREAT SERPL-MCNC: 0.45 MG/DL — LOW (ref 0.5–1.3)
EGFR: 109 ML/MIN/1.73M2 — SIGNIFICANT CHANGE UP
GLUCOSE SERPL-MCNC: 151 MG/DL — HIGH (ref 70–99)
HCT VFR BLD CALC: 31.1 % — LOW (ref 34.5–45)
HGB BLD-MCNC: 9.7 G/DL — LOW (ref 11.5–15.5)
LEGIONELLA AG UR QL: NEGATIVE — SIGNIFICANT CHANGE UP
MCHC RBC-ENTMCNC: 27.6 PG — SIGNIFICANT CHANGE UP (ref 27–34)
MCHC RBC-ENTMCNC: 31.2 GM/DL — LOW (ref 32–36)
MCV RBC AUTO: 88.6 FL — SIGNIFICANT CHANGE UP (ref 80–100)
NRBC # BLD: 0 /100 WBCS — SIGNIFICANT CHANGE UP (ref 0–0)
PLATELET # BLD AUTO: 681 K/UL — HIGH (ref 150–400)
POTASSIUM SERPL-MCNC: 4.6 MMOL/L — SIGNIFICANT CHANGE UP (ref 3.5–5.3)
POTASSIUM SERPL-SCNC: 4.6 MMOL/L — SIGNIFICANT CHANGE UP (ref 3.5–5.3)
PROT SERPL-MCNC: 7.4 G/DL — SIGNIFICANT CHANGE UP (ref 6–8.3)
RBC # BLD: 3.51 M/UL — LOW (ref 3.8–5.2)
RBC # FLD: 15.7 % — HIGH (ref 10.3–14.5)
SARS-COV-2 RNA SPEC QL NAA+PROBE: SIGNIFICANT CHANGE UP
SODIUM SERPL-SCNC: 136 MMOL/L — SIGNIFICANT CHANGE UP (ref 135–145)
WBC # BLD: 14.82 K/UL — HIGH (ref 3.8–10.5)
WBC # FLD AUTO: 14.82 K/UL — HIGH (ref 3.8–10.5)

## 2022-10-08 RX ORDER — ACETAMINOPHEN 500 MG
1000 TABLET ORAL ONCE
Refills: 0 | Status: COMPLETED | OUTPATIENT
Start: 2022-10-08 | End: 2022-10-08

## 2022-10-08 RX ADMIN — Medication 20 MILLIGRAM(S): at 23:58

## 2022-10-08 RX ADMIN — Medication 20 MILLIGRAM(S): at 18:45

## 2022-10-08 RX ADMIN — Medication 20 MILLIGRAM(S): at 05:54

## 2022-10-08 RX ADMIN — LOSARTAN POTASSIUM 50 MILLIGRAM(S): 100 TABLET, FILM COATED ORAL at 05:54

## 2022-10-08 RX ADMIN — CEFEPIME 100 MILLIGRAM(S): 1 INJECTION, POWDER, FOR SOLUTION INTRAMUSCULAR; INTRAVENOUS at 10:44

## 2022-10-08 RX ADMIN — Medication 50 MILLIGRAM(S): at 14:37

## 2022-10-08 RX ADMIN — CEFEPIME 100 MILLIGRAM(S): 1 INJECTION, POWDER, FOR SOLUTION INTRAMUSCULAR; INTRAVENOUS at 00:59

## 2022-10-08 RX ADMIN — CEFEPIME 100 MILLIGRAM(S): 1 INJECTION, POWDER, FOR SOLUTION INTRAMUSCULAR; INTRAVENOUS at 18:42

## 2022-10-08 RX ADMIN — Medication 20 MILLIGRAM(S): at 14:38

## 2022-10-08 RX ADMIN — CHLORHEXIDINE GLUCONATE 1 APPLICATION(S): 213 SOLUTION TOPICAL at 05:53

## 2022-10-08 RX ADMIN — Medication 20 MILLIGRAM(S): at 00:59

## 2022-10-08 RX ADMIN — Medication 400 MILLIGRAM(S): at 23:58

## 2022-10-08 RX ADMIN — Medication 325 MILLIGRAM(S): at 14:37

## 2022-10-08 RX ADMIN — ENOXAPARIN SODIUM 40 MILLIGRAM(S): 100 INJECTION SUBCUTANEOUS at 05:54

## 2022-10-08 RX ADMIN — SENNA PLUS 2 TABLET(S): 8.6 TABLET ORAL at 21:09

## 2022-10-08 NOTE — PROGRESS NOTE ADULT - ASSESSMENT
ASSESSMENT:    61 year old gentlewoman, lifelong non-smoker, with history of obesity related obstructive sleep apnea (non-adherent with prescribed CPAP) and mild asthma on albuterol as needed. The patient underwent a right hip replacement revision in May. Due to non-healing, she underwent implantation of a proximal femur hook plate, cables and an allograft femur strut in September. She has been on cefepime for ~ 5 weeks due to pseudomonas found on tissue culture. She was discharged to rehab but returned to the ER yesterday with abdominal discomfort due to a hernia that has been reduced. She has no abdominal pain at present. The patient has been feeling unwell for ~ 1 week. She has fatigue, malaise and weakness. She has been having low grade fevers associated with nighttime fevers and drenching sweats. She has been short of breath with a cough especially when inspiring. She has anterior chest pain exacerbated by cough and breathing. She has no chest congestion or wheeze. In the ER, the patient was found to be hypoxic.     CT scan ->  no evidence of pulmonary embolism - extensive bilateral peripheral infiltrates may be seen in the setting of Covid pneumonia - no acute pathology in the abdomen and pelvis - small fat-containing ventral hernias are appreciated    the patient has hypoxemia in the setting of fatigue, malaise and weakness, low grade fevers associated with nighttime fevers and drenching sweats, dyspnea/hypoxemia and a cough with pleuritic chest pain - CT scan has peripheral opacities in both the upper and lower lobes - it would be unusual for the patient to have developed bacterial pneumonia while on cefepime for a pseudomonal joint infection - suspect that the patient has cryptogenic organizing pneumonia rather than eosinophilic pneumonia which tends to be upper lobe predominant    PLAN/RECOMMENDATIONS:    oxygen supplementation to keep saturation greater than 92% - currently on a 3lpm nasal canula  observe off nebs  start solumedrol 20mg IVP q6h  continue cefepime  discontinue azithromycin  discontinue doxycycline if the urine Legionella antigen is negative - RVP is negative  robitussin DM/tessalon/hycodan at bedtime  diligent DVT prophylaxis    Thank you for the courtesy of this referral. Plan of care discussed with the patient at bedside and with     The patient's respiratory status remains stable. Dr. Kirk Smith will be covering our service for the weekend. Please call [phone] with questions or clinical changes. Thank you.      Chris Lomas MD, [facility]  [phone]  Pulmonary Medicine       ASSESSMENT:    61 year old gentlewoman, lifelong non-smoker, with history of obesity related obstructive sleep apnea (non-adherent with prescribed CPAP) and mild asthma on albuterol as needed. The patient underwent a right hip replacement revision in May. Due to non-healing, she underwent implantation of a proximal femur hook plate, cables and an allograft femur strut in September. She has been on cefepime for ~ 5 weeks due to pseudomonas found on tissue culture. She was discharged to rehab but returned to the ER yesterday with abdominal discomfort due to a hernia that has been reduced. She has no abdominal pain at present. The patient has been feeling unwell for ~ 1 week. She has fatigue, malaise and weakness. She has been having low grade fevers associated with nighttime fevers and drenching sweats. She has been short of breath with a cough especially when inspiring. She has anterior chest pain exacerbated by cough and breathing. She has no chest congestion or wheeze. In the ER, the patient was found to be hypoxic.     CT scan ->  no evidence of pulmonary embolism - extensive bilateral peripheral infiltrates may be seen in the setting of Covid pneumonia - no acute pathology in the abdomen and pelvis - small fat-containing ventral hernias are appreciated    the patient has hypoxemia in the setting of fatigue, malaise and weakness, low grade fevers associated with nighttime fevers and drenching sweats, dyspnea/hypoxemia and a cough with pleuritic chest pain - CT scan has peripheral opacities in both the upper and lower lobes - it would be unusual for the patient to have developed bacterial pneumonia while on cefepime for a pseudomonal joint infection - suspect that the patient has cryptogenic organizing pneumonia rather than eosinophilic pneumonia which tends to be upper lobe predominant    PLAN/RECOMMENDATIONS:    oxygen supplementation to keep saturation greater than 92% - currently on a 3lpm nasal canula  observe off nebs  Maintaining solumedrol 20mg IVP q6h  continue cefepime  discontinue doxycycline as the urine Legionella antigen is negative - RVP is negative  robitussin DM/tessalon/hycodan at bedtime  diligent DVT prophylaxis      Kirk Smith MD, [facility]  [phone]  Pulmonary Medicine

## 2022-10-08 NOTE — PHYSICAL THERAPY INITIAL EVALUATION ADULT - PLANNED THERAPY INTERVENTIONS, PT EVAL
stair negotiation GOAL: patient will negotiate up / down 1 flight of stairs independently in 2 weeks/bed mobility training/gait training/transfer training

## 2022-10-08 NOTE — PHYSICAL THERAPY INITIAL EVALUATION ADULT - ADDITIONAL COMMENTS
Patient admitted from [facility], ambulating with rolling walker. Prior to rehab, patient lives alone in duplex apartment, +ramp to enter, 18 stairs within apartment to bedroom level.

## 2022-10-08 NOTE — PROGRESS NOTE ADULT - SUBJECTIVE AND OBJECTIVE BOX
CARDIOLOGY     PROGRESS  NOTE   ________________________________________________    CHIEF COMPLAINT:Patient is a 61y old  Female who presents with a chief complaint of abdominal wall hernia non-reducible, nausea (07 Oct 2022 12:39)  no complain.  	  REVIEW OF SYSTEMS:  CONSTITUTIONAL: No fever, weight loss, or fatigue  EYES: No eye pain, visual disturbances, or discharge  ENT:  No difficulty hearing, tinnitus, vertigo; No sinus or throat pain  NECK: No pain or stiffness  RESPIRATORY: + cough, wheezing, chills or hemoptysis;+ Shortness of Breath  CARDIOVASCULAR: No chest pain, palpitations, passing out, dizziness, or leg swelling  GASTROINTESTINAL: No abdominal or epigastric pain. No nausea, vomiting, or hematemesis; No diarrhea or constipation. No melena or hematochezia.  GENITOURINARY: No dysuria, frequency, hematuria, or incontinence  NEUROLOGICAL: No headaches, memory loss, loss of strength, numbness, or tremors  SKIN: No itching, burning, rashes, or lesions   LYMPH Nodes: No enlarged glands  ENDOCRINE: No heat or cold intolerance; No hair loss  MUSCULOSKELETAL: No joint pain or swelling; No muscle, back, or extremity pain  PSYCHIATRIC: No depression, anxiety, mood swings, or difficulty sleeping  HEME/LYMPH: No easy bruising, or bleeding gums  ALLERGY AND IMMUNOLOGIC: No hives or eczema	    [ ] All others negative	  [ x] Unable to obtain    PHYSICAL EXAM:  T(C): 36.6 (10-08-22 @ 13:01), Max: 37.1 (10-07-22 @ 18:35)  HR: 118 (10-08-22 @ 13:15) (79 - 118)  BP: 133/87 (10-08-22 @ 13:15) (112/83 - 148/93)  RR: 18 (10-08-22 @ 13:01) (18 - 18)  SpO2: 92% (10-08-22 @ 13:15) (92% - 96%)  Wt(kg): --  I&O's Summary    07 Oct 2022 07:01  -  08 Oct 2022 07:00  --------------------------------------------------------  IN: 1120 mL / OUT: 0 mL / NET: 1120 mL        Appearance: Normal	  HEENT:   Normal oral mucosa, PERRL, EOMI	  Lymphatic: No lymphadenopathy  Cardiovascular: Normal S1 S2, No JVD,+ murmurs, No edema  Respiratory: Lungs clear to auscultation	  Gastrointestinal:  Soft, N+ mildly tender, + BS	  Skin: No rashes, No ecchymoses, No cyanosis	  Neurologic: Non-focal  Extremities: Normal range of motion, No clubbing, cyanosis or edema  Vascular: Peripheral pulses palpable 2+ bilaterally    MEDICATIONS  (STANDING):  cefepime   IVPB 2000 milliGRAM(s) IV Intermittent every 8 hours  chlorhexidine 4% Liquid 1 Application(s) Topical <User Schedule>  doxycycline monohydrate Capsule 100 milliGRAM(s) Oral every 12 hours  enoxaparin Injectable 40 milliGRAM(s) SubCutaneous every 24 hours  ferrous    sulfate 325 milliGRAM(s) Oral daily  losartan 50 milliGRAM(s) Oral daily  methylPREDNISolone sodium succinate Injectable 20 milliGRAM(s) IV Push every 6 hours  senna 2 Tablet(s) Oral at bedtime  topiramate 50 milliGRAM(s) Oral daily      TELEMETRY: 	    ECG:  	  RADIOLOGY:  OTHER: 	  	  LABS:	 	    CARDIAC MARKERS:                                9.7    14.82 )-----------( 681      ( 08 Oct 2022 07:00 )             31.1     10-08    136  |  104  |  15  ----------------------------<  151<H>  4.6   |  21<L>  |  0.45<L>    Ca    10.0      08 Oct 2022 07:00    TPro  7.4  /  Alb  3.5  /  TBili  0.1<L>  /  DBili  x   /  AST  7<L>  /  ALT  15  /  AlkPhos  81  10-08    proBNP: Serum Pro-Brain Natriuretic Peptide: 48 pg/mL (10-06 @ 17:18)    Lipid Profile:   HgA1c:   TSH:     oxygen supplementation to keep saturation greater than 92% - currently on a 3lpm nasal canula  observe off nebs  start solumedrol 20mg IVP q6h  continue cefepime  discontinue azithromycin  discontinue doxycycline if the urine Legionella antigen is negative - RVP is negative  robitussin DM/tessalon/hycodan at bedtime  diligent DVT prophylaxis    Legionella pneumophila Antigen, Urine (10.07.22 @ 19:52)    Legionella Antigen, Urine: Negative: Positive Testing method: Immunochromatographic Assay.  Presumptive detection of L. pneumophila serogroup 1 antigen in urine,  suggesting recent or current infection. Order “Culture –Legionella” as  recommended to confirm infection.  Negative Testing method: Immunochromatographic Assay.  L. pneumophila serogroup 1 antigen in urine NOT detected, suggesting NO  recent or current infection. Infection due to Legionella cannot be ruled  out: other serogroups and species may cause disease, antigen may not be  present in urine in early infection, or the level of antigens in urine  may be below the detection limit of the test.  Order “Culture  –Legionella” is recommended for uncommon cases of suspected Legionella  pneumonia due to organisms other thanL. pneumophila serogroup 1.        Assessment and plan  ---------------------------  62 y/o female medical history sig for Asthma, obesity, JOSE LUIS not on NIV, Migraines, HTN who was accepted for transfer from [facility] by Dr. Byrnes for evaluation of previous right THR on 5/2/22. Patient was admitted to [facility] on 8/27 s/p fall and found to have fractured femoral prosthesis/ non union and may need bone graft. She denies any LOC or head trauma from fall, describes fall as leg giving out. She was seen by Cardiology prior to transfer and deemed stable for surgery. Denies any tori-operative issues in 5/22 during last revision.    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 WBAT. PT/ OT. Ortho following . OR Cultures PRELIM rare Pseudomonas. ID consulted and Cefepime started antibiotics through 10/28/22 for total 8 weeks. Will need suppression thereafter.  HTN-  Losartan and hold HCTZ due to soft BP.  Migraine - Cont. Topamax  JOSE LUIS- Not on NIV HS. O/P pulm f/u  Asthma- PRN nebs  DVT Prophylaxis -- Lovenox SQ   Abdominal hernia - reduced, asymptomatic.   dc azithromycin  pulmonary noted started on solumedrol    	                    CARDIOLOGY     PROGRESS  NOTE   ________________________________________________    CHIEF COMPLAINT:Patient is a 61y old  Female who presents with a chief complaint of abdominal wall hernia non-reducible, nausea (07 Oct 2022 12:39)  no complain.  	  REVIEW OF SYSTEMS:  CONSTITUTIONAL: No fever, weight loss, or fatigue  EYES: No eye pain, visual disturbances, or discharge  ENT:  No difficulty hearing, tinnitus, vertigo; No sinus or throat pain  NECK: No pain or stiffness  RESPIRATORY: + cough, wheezing, chills or hemoptysis;+ Shortness of Breath  CARDIOVASCULAR: No chest pain, palpitations, passing out, dizziness, or leg swelling  GASTROINTESTINAL: No abdominal or epigastric pain. No nausea, vomiting, or hematemesis; No diarrhea or constipation. No melena or hematochezia.  GENITOURINARY: No dysuria, frequency, hematuria, or incontinence  NEUROLOGICAL: No headaches, memory loss, loss of strength, numbness, or tremors  SKIN: No itching, burning, rashes, or lesions   LYMPH Nodes: No enlarged glands  ENDOCRINE: No heat or cold intolerance; No hair loss  MUSCULOSKELETAL: No joint pain or swelling; No muscle, back, or extremity pain  PSYCHIATRIC: No depression, anxiety, mood swings, or difficulty sleeping  HEME/LYMPH: No easy bruising, or bleeding gums  ALLERGY AND IMMUNOLOGIC: No hives or eczema	    [ ] All others negative	  [ x] Unable to obtain    PHYSICAL EXAM:  T(C): 36.6 (10-08-22 @ 13:01), Max: 37.1 (10-07-22 @ 18:35)  HR: 118 (10-08-22 @ 13:15) (79 - 118)  BP: 133/87 (10-08-22 @ 13:15) (112/83 - 148/93)  RR: 18 (10-08-22 @ 13:01) (18 - 18)  SpO2: 92% (10-08-22 @ 13:15) (92% - 96%)  Wt(kg): --  I&O's Summary    07 Oct 2022 07:01  -  08 Oct 2022 07:00  --------------------------------------------------------  IN: 1120 mL / OUT: 0 mL / NET: 1120 mL        Appearance: Normal	  HEENT:   Normal oral mucosa, PERRL, EOMI	  Lymphatic: No lymphadenopathy  Cardiovascular: Normal S1 S2, No JVD,+ murmurs, No edema  Respiratory: Lungs clear to auscultation	  Gastrointestinal:  Soft, Non  tender, + BS	  Skin: No rashes, No ecchymoses, No cyanosis	  Neurologic: Non-focal  Extremities: Normal range of motion, No clubbing, cyanosis or edema  Vascular: Peripheral pulses palpable 2+ bilaterally    MEDICATIONS  (STANDING):  cefepime   IVPB 2000 milliGRAM(s) IV Intermittent every 8 hours  chlorhexidine 4% Liquid 1 Application(s) Topical <User Schedule>  doxycycline monohydrate Capsule 100 milliGRAM(s) Oral every 12 hours  enoxaparin Injectable 40 milliGRAM(s) SubCutaneous every 24 hours  ferrous    sulfate 325 milliGRAM(s) Oral daily  losartan 50 milliGRAM(s) Oral daily  methylPREDNISolone sodium succinate Injectable 20 milliGRAM(s) IV Push every 6 hours  senna 2 Tablet(s) Oral at bedtime  topiramate 50 milliGRAM(s) Oral daily      TELEMETRY: 	    ECG:  	  RADIOLOGY:  OTHER: 	  	  LABS:	 	    CARDIAC MARKERS:                                9.7    14.82 )-----------( 681      ( 08 Oct 2022 07:00 )             31.1     10-08    136  |  104  |  15  ----------------------------<  151<H>  4.6   |  21<L>  |  0.45<L>    Ca    10.0      08 Oct 2022 07:00    TPro  7.4  /  Alb  3.5  /  TBili  0.1<L>  /  DBili  x   /  AST  7<L>  /  ALT  15  /  AlkPhos  81  10-08    proBNP: Serum Pro-Brain Natriuretic Peptide: 48 pg/mL (10-06 @ 17:18)    Lipid Profile:   HgA1c:   TSH:     oxygen supplementation to keep saturation greater than 92% - currently on a 3lpm nasal canula  observe off nebs  start solumedrol 20mg IVP q6h  continue cefepime  discontinue azithromycin  discontinue doxycycline if the urine Legionella antigen is negative - RVP is negative  robitussin DM/tessalon/hycodan at bedtime  diligent DVT prophylaxis    Legionella pneumophila Antigen, Urine (10.07.22 @ 19:52)    Legionella Antigen, Urine: Negative: Positive Testing method: Immunochromatographic Assay.  Presumptive detection of L. pneumophila serogroup 1 antigen in urine,  suggesting recent or current infection. Order “Culture –Legionella” as  recommended to confirm infection.  Negative Testing method: Immunochromatographic Assay.  L. pneumophila serogroup 1 antigen in urine NOT detected, suggesting NO  recent or current infection. Infection due to Legionella cannot be ruled  out: other serogroups and species may cause disease, antigen may not be  present in urine in early infection, or the level of antigens in urine  may be below the detection limit of the test.  Order “Culture  –Legionella” is recommended for uncommon cases of suspected Legionella  pneumonia due to organisms other thanL. pneumophila serogroup 1.        Assessment and plan  ---------------------------  62 y/o female medical history sig for Asthma, obesity, JOSE LUIS not on NIV, Migraines, HTN who was accepted for transfer from [facility] by Dr. Byrnes for evaluation of previous right THR on 5/2/22. Patient was admitted to [facility] on 8/27 s/p fall and found to have fractured femoral prosthesis/ non union and may need bone graft. She denies any LOC or head trauma from fall, describes fall as leg giving out. She was seen by Cardiology prior to transfer and deemed stable for surgery. Denies any tori-operative issues in 5/22 during last revision.    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 WBAT. PT/ OT. Ortho following . OR Cultures PRELIM rare Pseudomonas. ID consulted and Cefepime started antibiotics through 10/28/22 for total 8 weeks. Will need suppression thereafter.  HTN-  Losartan and hold HCTZ due to soft BP.  Migraine - Cont. Topamax  JOSE LUIS- Not on NIV HS. O/P pulm f/u  Asthma- PRN nebs  DVT Prophylaxis -- Lovenox SQ   Abdominal hernia - reduced, asymptomatic.   dc azithromycin  pulmonary noted started on solumedrol

## 2022-10-08 NOTE — PROGRESS NOTE ADULT - SUBJECTIVE AND OBJECTIVE BOX
Follow-up Pulm Progress Note    The patient was seen and examined. Notes reviewed and discussed with staff/team as applicable      No new respiratory events overnight.      Denies: SOB, Chest pain, increased cough, colored phlegm, hemoptysis, N/V/D, neck stiffness, dysuria  ROS otherwise within normal limits    Vital Signs Last 24 Hrs  T(C): 36.6 (08 Oct 2022 13:01), Max: 37.1 (07 Oct 2022 18:35)  T(F): 97.9 (08 Oct 2022 13:01), Max: 98.8 (07 Oct 2022 18:35)  HR: 118 (08 Oct 2022 13:15) (79 - 118)  BP: 133/87 (08 Oct 2022 13:15) (112/83 - 148/93)  BP(mean): --  RR: 18 (08 Oct 2022 13:01) (18 - 18)  SpO2: 92% (08 Oct 2022 13:15) (92% - 96%)    Parameters below as of 08 Oct 2022 13:15  Patient On (Oxygen Delivery Method): nasal cannula  O2 Flow (L/min): 3            10-07 @ 07:01  -  10-08 @ 07:00  --------------------------------------------------------  IN: 1120 mL / OUT: 0 mL / NET: 1120 mL          Medications:  MEDICATIONS  (STANDING):  cefepime   IVPB 2000 milliGRAM(s) IV Intermittent every 8 hours  chlorhexidine 4% Liquid 1 Application(s) Topical <User Schedule>  doxycycline monohydrate Capsule 100 milliGRAM(s) Oral every 12 hours  enoxaparin Injectable 40 milliGRAM(s) SubCutaneous every 24 hours  ferrous    sulfate 325 milliGRAM(s) Oral daily  losartan 50 milliGRAM(s) Oral daily  methylPREDNISolone sodium succinate Injectable 20 milliGRAM(s) IV Push every 6 hours  senna 2 Tablet(s) Oral at bedtime  topiramate 50 milliGRAM(s) Oral daily    MEDICATIONS  (PRN):  oxyCODONE    IR 5 milliGRAM(s) Oral every 4 hours PRN Severe Pain (7 - 10)  polyethylene glycol 3350 17 Gram(s) Oral daily PRN Constipation  sodium chloride 0.9% lock flush 10 milliLiter(s) IV Push every 1 hour PRN Pre/post blood products, medications, blood draw, and to maintain line patency      Allergies    No Known Allergies    Intolerances    opioid-like analgesics (Vomiting; Nausea)      Vent settings (if applicable)        Physical Examination:    Pleasant  Neck: no JVD, LAD, accessory muscle use  PULM: Clear to auscultation bilaterally, no wheezes, rales, rhonchi  CVS: Regular rate and rhythm, S1S2, no murmurs, rubs, or gallops  Abdomen:  Extremities:  Neuro:      LABS:                        9.7    14.82 )-----------( 681      ( 08 Oct 2022 07:00 )             31.1     10    136  |  104  |  15  ----------------------------<  151<H>  4.6   |  21<L>  |  0.45<L>    Ca    10.0      08 Oct 2022 07:00    TPro  7.4  /  Alb  3.5  /  TBili  0.1<L>  /  DBili  x   /  AST  7<L>  /  ALT  15  /  AlkPhos  81  10-08          CAPILLARY BLOOD GLUCOSE          Urinalysis Basic - ( 06 Oct 2022 18:03 )    Color: Light Yellow / Appearance: Clear / S.010 / pH: x  Gluc: x / Ketone: Negative  / Bili: Negative / Urobili: Negative   Blood: x / Protein: Trace / Nitrite: Negative   Leuk Esterase: Negative / RBC: 1 /hpf / WBC 3 /HPF   Sq Epi: x / Non Sq Epi: 6 /hpf / Bacteria: Negative        Serum Pro-Brain Natriuretic Peptide: 48 pg/mL (10-06-22 @ 17:18)      CULTURES:  Culture Results:   No growth to date. (10-07 @ 00:10)  Culture Results:   No growth to date. (10-06 @ 23:40)  Culture Results:   10,000 - 49,000 CFU/mL Gram Positive Cocci in Pairs and Chains (10-06 @ 18:03)    Most recent blood culture -- 10-07 @ 00:10   -- -- .Blood Blood-Peripheral 10-07 @ 00:10  Most recent blood culture -- 10-06 @ 23:40   -- -- .Blood Blood-Peripheral 10-06 @ 23:40  Most recent blood culture -- 10-06 @ 18:03   -- -- Clean Catch Clean Catch (Midstream) 10-06 @ 18:03      RADIOLOGY REVIEWED    CXR:      CT chest:      Other:   Follow-up Pulm Progress Note    The patient was seen and examined. Notes reviewed and discussed with staff/team as applicable      No new complaints.  Less dyspnea and less chest discomfort with IV steroids.      ROS: otherwise non-contributory    Vital Signs Last 24 Hrs  T(C): 36.6 (08 Oct 2022 13:01), Max: 37.1 (07 Oct 2022 18:35)  T(F): 97.9 (08 Oct 2022 13:01), Max: 98.8 (07 Oct 2022 18:35)  HR: 118 (08 Oct 2022 13:15) (79 - 118)  BP: 133/87 (08 Oct 2022 13:15) (112/83 - 148/93)  BP(mean): --  RR: 18 (08 Oct 2022 13:01) (18 - 18)  SpO2: 92% (08 Oct 2022 13:15) (92% - 96%)    Parameters below as of 08 Oct 2022 13:15  Patient On (Oxygen Delivery Method): nasal cannula  O2 Flow (L/min): 3            10-07 @ 07:01  -  10-08 @ 07:00  --------------------------------------------------------  IN: 1120 mL / OUT: 0 mL / NET: 1120 mL          Medications:  MEDICATIONS  (STANDING):  cefepime   IVPB 2000 milliGRAM(s) IV Intermittent every 8 hours  chlorhexidine 4% Liquid 1 Application(s) Topical <User Schedule>  doxycycline monohydrate Capsule 100 milliGRAM(s) Oral every 12 hours  enoxaparin Injectable 40 milliGRAM(s) SubCutaneous every 24 hours  ferrous    sulfate 325 milliGRAM(s) Oral daily  losartan 50 milliGRAM(s) Oral daily  methylPREDNISolone sodium succinate Injectable 20 milliGRAM(s) IV Push every 6 hours  senna 2 Tablet(s) Oral at bedtime  topiramate 50 milliGRAM(s) Oral daily    MEDICATIONS  (PRN):  oxyCODONE    IR 5 milliGRAM(s) Oral every 4 hours PRN Severe Pain (7 - 10)  polyethylene glycol 3350 17 Gram(s) Oral daily PRN Constipation  sodium chloride 0.9% lock flush 10 milliLiter(s) IV Push every 1 hour PRN Pre/post blood products, medications, blood draw, and to maintain line patency      Allergies    No Known Allergies    Intolerances    opioid-like analgesics (Vomiting; Nausea)      Vent settings (if applicable)        Physical Examination:    Pleasant  Neck: no JVD, LAD, accessory muscle use  PULM:  prolonged expiratory phase, coarse bs with decreased breath sounds at the bases  CVS: Regular rate without rub or gallop  Abdomen:  soft, previous surgical site covered, normoactive bs  Extremities: without marked cyanosis, clubbing, tenderness, or edema  Neuro: alert, appropriately interactive, grossly intact      LABS:                        9.7    14.82 )-----------( 681      ( 08 Oct 2022 07:00 )             31.1     10-08    136  |  104  |  15  ----------------------------<  151<H>  4.6   |  21<L>  |  0.45<L>    Ca    10.0      08 Oct 2022 07:00    TPro  7.4  /  Alb  3.5  /  TBili  0.1<L>  /  DBili  x   /  AST  7<L>  /  ALT  15  /  AlkPhos  81  10-08          CAPILLARY BLOOD GLUCOSE          Urinalysis Basic - ( 06 Oct 2022 18:03 )    Color: Light Yellow / Appearance: Clear / S.010 / pH: x  Gluc: x / Ketone: Negative  / Bili: Negative / Urobili: Negative   Blood: x / Protein: Trace / Nitrite: Negative   Leuk Esterase: Negative / RBC: 1 /hpf / WBC 3 /HPF   Sq Epi: x / Non Sq Epi: 6 /hpf / Bacteria: Negative        Serum Pro-Brain Natriuretic Peptide: 48 pg/mL (10-06-22 @ 17:18)      CULTURES:  Culture Results:   No growth to date. (10-07 @ 00:10)  Culture Results:   No growth to date. (10-06 @ 23:40)  Culture Results:   10,000 - 49,000 CFU/mL Gram Positive Cocci in Pairs and Chains (10-06 @ 18:03)    Most recent blood culture -- 10-07 @ 00:10   -- -- .Blood Blood-Peripheral 10-07 @ 00:10  Most recent blood culture -- 10-06 @ 23:40   -- -- .Blood Blood-Peripheral 10-06 @ 23:40  Most recent blood culture -- 10-06 @ 18:03   -- -- Clean Catch Clean Catch (Midstream) 10-06 @ 18:03      RADIOLOGY REVIEWED    CXR:      CT chest:      Other:   Follow-up Pulm Progress Note    The patient was seen and examined. Notes reviewed and discussed with staff/team as applicable      No new complaints.  Less dyspnea and less chest discomfort with IV steroids.      ROS: otherwise non-contributory    Vital Signs Last 24 Hrs  T(C): 36.6 (08 Oct 2022 13:01), Max: 37.1 (07 Oct 2022 18:35)  T(F): 97.9 (08 Oct 2022 13:01), Max: 98.8 (07 Oct 2022 18:35)  HR: 118 (08 Oct 2022 13:15) (79 - 118)  BP: 133/87 (08 Oct 2022 13:15) (112/83 - 148/93)  BP(mean): --  RR: 18 (08 Oct 2022 13:01) (18 - 18)  SpO2: 92% (08 Oct 2022 13:15) (92% - 96%)    Parameters below as of 08 Oct 2022 13:15  Patient On (Oxygen Delivery Method): nasal cannula  O2 Flow (L/min): 3            10-07 @ 07:01  -  10-08 @ 07:00  --------------------------------------------------------  IN: 1120 mL / OUT: 0 mL / NET: 1120 mL          Medications:  MEDICATIONS  (STANDING):  cefepime   IVPB 2000 milliGRAM(s) IV Intermittent every 8 hours  chlorhexidine 4% Liquid 1 Application(s) Topical <User Schedule>  doxycycline monohydrate Capsule 100 milliGRAM(s) Oral every 12 hours  enoxaparin Injectable 40 milliGRAM(s) SubCutaneous every 24 hours  ferrous    sulfate 325 milliGRAM(s) Oral daily  losartan 50 milliGRAM(s) Oral daily  methylPREDNISolone sodium succinate Injectable 20 milliGRAM(s) IV Push every 6 hours  senna 2 Tablet(s) Oral at bedtime  topiramate 50 milliGRAM(s) Oral daily    MEDICATIONS  (PRN):  oxyCODONE    IR 5 milliGRAM(s) Oral every 4 hours PRN Severe Pain (7 - 10)  polyethylene glycol 3350 17 Gram(s) Oral daily PRN Constipation  sodium chloride 0.9% lock flush 10 milliLiter(s) IV Push every 1 hour PRN Pre/post blood products, medications, blood draw, and to maintain line patency      Allergies    No Known Allergies    Intolerances    opioid-like analgesics (Vomiting; Nausea)      Vent settings (if applicable)        Physical Examination:    Pleasant  Neck: no JVD, LAD, accessory muscle use  PULM:  prolonged expiratory phase, coarse bs with decreased breath sounds at the bases  CVS: Regular rate without rub or gallop  Abdomen:  soft, previous without obvious tenderness, normoactive bs  Extremities: without marked cyanosis, clubbing, tenderness, or edema  Neuro: alert, appropriately interactive, grossly intact      LABS:                        9.7    14.82 )-----------( 681      ( 08 Oct 2022 07:00 )             31.1     10-08    136  |  104  |  15  ----------------------------<  151<H>  4.6   |  21<L>  |  0.45<L>    Ca    10.0      08 Oct 2022 07:00    TPro  7.4  /  Alb  3.5  /  TBili  0.1<L>  /  DBili  x   /  AST  7<L>  /  ALT  15  /  AlkPhos  81  10-08          CAPILLARY BLOOD GLUCOSE          Urinalysis Basic - ( 06 Oct 2022 18:03 )    Color: Light Yellow / Appearance: Clear / S.010 / pH: x  Gluc: x / Ketone: Negative  / Bili: Negative / Urobili: Negative   Blood: x / Protein: Trace / Nitrite: Negative   Leuk Esterase: Negative / RBC: 1 /hpf / WBC 3 /HPF   Sq Epi: x / Non Sq Epi: 6 /hpf / Bacteria: Negative        Serum Pro-Brain Natriuretic Peptide: 48 pg/mL (10-06-22 @ 17:18)      CULTURES:  Culture Results:   No growth to date. (10-07 @ 00:10)  Culture Results:   No growth to date. (10-06 @ 23:40)  Culture Results:   10,000 - 49,000 CFU/mL Gram Positive Cocci in Pairs and Chains (10-06 @ 18:03)    Most recent blood culture -- 10-07 @ 00:10   -- -- .Blood Blood-Peripheral 10-07 @ 00:10  Most recent blood culture -- 10-06 @ 23:40   -- -- .Blood Blood-Peripheral 10-06 @ 23:40  Most recent blood culture -- 10-06 @ 18:03   -- -- Clean Catch Clean Catch (Midstream) 10-06 @ 18:03      RADIOLOGY REVIEWED    CXR:      CT chest:      Other:

## 2022-10-08 NOTE — PHYSICAL THERAPY INITIAL EVALUATION ADULT - PERTINENT HX OF CURRENT PROBLEM, REHAB EVAL
61 year old woman PMH obesity, obstructive sleep apnea and mild asthma. Patient s/p right hip replacement revision in May. Due to non-healing, she underwent implantation of a proximal femur hook plate, cables and an allograft femur strut in September. She has been on cefepime for ~ 5 weeks due to pseudomonas found on tissue culture. She was discharged to rehab but returned to the ER 10/6/22 with abdominal discomfort due to a hernia that has been reduced. She has no abdominal pain at present. The patient has been feeling unwell for ~ 1 week. She has fatigue, malaise and weakness. She has been having low grade fevers associated with nighttime fevers and drenching sweats. She has been short of breath with a cough especially when inspiring. She has anterior chest pain exacerbated by cough and breathing. She has no chest congestion or wheeze. In the ER, the patient was found to be hypoxic.  10/6/22: CT angio chest: No evidence of pulmonary embolism. Extensive bilateral peripheral infiltrates may be seen in the setting of Covid pneumonia.   CT abdomen/pelvis: No acute pathology in the abdomen and pelvis  chest xray: Scattered bilateral opacities may represent multifocal pneumonia

## 2022-10-09 LAB
ANION GAP SERPL CALC-SCNC: 11 MMOL/L — SIGNIFICANT CHANGE UP (ref 5–17)
BUN SERPL-MCNC: 25 MG/DL — HIGH (ref 7–23)
CALCIUM SERPL-MCNC: 9.7 MG/DL — SIGNIFICANT CHANGE UP (ref 8.4–10.5)
CHLORIDE SERPL-SCNC: 105 MMOL/L — SIGNIFICANT CHANGE UP (ref 96–108)
CO2 SERPL-SCNC: 21 MMOL/L — LOW (ref 22–31)
CREAT SERPL-MCNC: 0.52 MG/DL — SIGNIFICANT CHANGE UP (ref 0.5–1.3)
EGFR: 105 ML/MIN/1.73M2 — SIGNIFICANT CHANGE UP
GLUCOSE SERPL-MCNC: 151 MG/DL — HIGH (ref 70–99)
HCT VFR BLD CALC: 29.1 % — LOW (ref 34.5–45)
HGB BLD-MCNC: 9.3 G/DL — LOW (ref 11.5–15.5)
MAGNESIUM SERPL-MCNC: 2.3 MG/DL — SIGNIFICANT CHANGE UP (ref 1.6–2.6)
MCHC RBC-ENTMCNC: 28 PG — SIGNIFICANT CHANGE UP (ref 27–34)
MCHC RBC-ENTMCNC: 32 GM/DL — SIGNIFICANT CHANGE UP (ref 32–36)
MCV RBC AUTO: 87.7 FL — SIGNIFICANT CHANGE UP (ref 80–100)
NRBC # BLD: 0 /100 WBCS — SIGNIFICANT CHANGE UP (ref 0–0)
PHOSPHATE SERPL-MCNC: 3.8 MG/DL — SIGNIFICANT CHANGE UP (ref 2.5–4.5)
PLATELET # BLD AUTO: 698 K/UL — HIGH (ref 150–400)
POTASSIUM SERPL-MCNC: 4.9 MMOL/L — SIGNIFICANT CHANGE UP (ref 3.5–5.3)
POTASSIUM SERPL-SCNC: 4.9 MMOL/L — SIGNIFICANT CHANGE UP (ref 3.5–5.3)
RBC # BLD: 3.32 M/UL — LOW (ref 3.8–5.2)
RBC # FLD: 15.6 % — HIGH (ref 10.3–14.5)
SODIUM SERPL-SCNC: 137 MMOL/L — SIGNIFICANT CHANGE UP (ref 135–145)
WBC # BLD: 26.21 K/UL — HIGH (ref 3.8–10.5)
WBC # FLD AUTO: 26.21 K/UL — HIGH (ref 3.8–10.5)

## 2022-10-09 RX ORDER — ACETAMINOPHEN 500 MG
650 TABLET ORAL ONCE
Refills: 0 | Status: COMPLETED | OUTPATIENT
Start: 2022-10-09 | End: 2022-10-09

## 2022-10-09 RX ORDER — ACETAMINOPHEN 500 MG
650 TABLET ORAL EVERY 6 HOURS
Refills: 0 | Status: DISCONTINUED | OUTPATIENT
Start: 2022-10-09 | End: 2022-10-15

## 2022-10-09 RX ADMIN — Medication 650 MILLIGRAM(S): at 13:52

## 2022-10-09 RX ADMIN — SENNA PLUS 2 TABLET(S): 8.6 TABLET ORAL at 21:29

## 2022-10-09 RX ADMIN — Medication 20 MILLIGRAM(S): at 17:34

## 2022-10-09 RX ADMIN — CEFEPIME 100 MILLIGRAM(S): 1 INJECTION, POWDER, FOR SOLUTION INTRAMUSCULAR; INTRAVENOUS at 01:53

## 2022-10-09 RX ADMIN — CEFEPIME 100 MILLIGRAM(S): 1 INJECTION, POWDER, FOR SOLUTION INTRAMUSCULAR; INTRAVENOUS at 17:33

## 2022-10-09 RX ADMIN — Medication 1000 MILLIGRAM(S): at 00:28

## 2022-10-09 RX ADMIN — Medication 20 MILLIGRAM(S): at 12:46

## 2022-10-09 RX ADMIN — Medication 650 MILLIGRAM(S): at 21:37

## 2022-10-09 RX ADMIN — Medication 20 MILLIGRAM(S): at 05:17

## 2022-10-09 RX ADMIN — ENOXAPARIN SODIUM 40 MILLIGRAM(S): 100 INJECTION SUBCUTANEOUS at 05:16

## 2022-10-09 RX ADMIN — CHLORHEXIDINE GLUCONATE 1 APPLICATION(S): 213 SOLUTION TOPICAL at 08:48

## 2022-10-09 RX ADMIN — LOSARTAN POTASSIUM 50 MILLIGRAM(S): 100 TABLET, FILM COATED ORAL at 05:17

## 2022-10-09 RX ADMIN — Medication 50 MILLIGRAM(S): at 12:47

## 2022-10-09 RX ADMIN — Medication 650 MILLIGRAM(S): at 22:07

## 2022-10-09 RX ADMIN — CEFEPIME 100 MILLIGRAM(S): 1 INJECTION, POWDER, FOR SOLUTION INTRAMUSCULAR; INTRAVENOUS at 08:43

## 2022-10-09 RX ADMIN — Medication 650 MILLIGRAM(S): at 13:18

## 2022-10-09 RX ADMIN — Medication 325 MILLIGRAM(S): at 12:47

## 2022-10-09 NOTE — PROGRESS NOTE ADULT - ASSESSMENT
ASSESSMENT:    61 year old gentlewoman, lifelong non-smoker, with history of obesity related obstructive sleep apnea (non-adherent with prescribed CPAP) and mild asthma on albuterol as needed. The patient underwent a right hip replacement revision in May. Due to non-healing, she underwent implantation of a proximal femur hook plate, cables and an allograft femur strut in September. She has been on cefepime for ~ 5 weeks due to pseudomonas found on tissue culture. She was discharged to rehab but returned to the ER yesterday with abdominal discomfort due to a hernia that has been reduced. She has no abdominal pain at present. The patient has been feeling unwell for ~ 1 week. She has fatigue, malaise and weakness. She has been having low grade fevers associated with nighttime fevers and drenching sweats. She has been short of breath with a cough especially when inspiring. She has anterior chest pain exacerbated by cough and breathing. She has no chest congestion or wheeze. In the ER, the patient was found to be hypoxic.     CT scan ->  no evidence of pulmonary embolism - extensive bilateral peripheral infiltrates may be seen in the setting of Covid pneumonia - no acute pathology in the abdomen and pelvis - small fat-containing ventral hernias are appreciated    the patient has hypoxemia in the setting of fatigue, malaise and weakness, low grade fevers associated with nighttime fevers and drenching sweats, dyspnea/hypoxemia and a cough with pleuritic chest pain - CT scan has peripheral opacities in both the upper and lower lobes - it would be unusual for the patient to have developed bacterial pneumonia while on cefepime for a pseudomonal joint infection - suspect that the patient has cryptogenic organizing pneumonia rather than eosinophilic pneumonia which tends to be upper lobe predominant    PLAN/RECOMMENDATIONS:    oxygen supplementation to keep saturation greater than 92% - currently on a 3lpm nasal canula  observe off nebs  Maintaining solumedrol 20mg IVP q6h  continue cefepime  discontinue doxycycline as the urine Legionella antigen is negative - RVP is negative  robitussin DM/tessalon/hycodan at bedtime  diligent DVT prophylaxis      Kirk Smith MD, [facility]  [phone]  Pulmonary Medicine       ASSESSMENT:    61 year old gentlewoman, lifelong non-smoker, with history of obesity related obstructive sleep apnea (non-adherent with prescribed CPAP) and mild asthma on albuterol as needed. The patient underwent a right hip replacement revision in May. Due to non-healing, she underwent implantation of a proximal femur hook plate, cables and an allograft femur strut in September. She has been on cefepime for ~ 5 weeks due to pseudomonas found on tissue culture. She was discharged to rehab but returned to the ER yesterday with abdominal discomfort due to a hernia that has been reduced. She has no abdominal pain at present. The patient has been feeling unwell for ~ 1 week. She has fatigue, malaise and weakness. She has been having low grade fevers associated with nighttime fevers and drenching sweats. She has been short of breath with a cough especially when inspiring. She has anterior chest pain exacerbated by cough and breathing. She has no chest congestion or wheeze. In the ER, the patient was found to be hypoxic.     CT scan ->  no evidence of pulmonary embolism - extensive bilateral peripheral infiltrates may be seen in the setting of Covid pneumonia - no acute pathology in the abdomen and pelvis - small fat-containing ventral hernias are appreciated    the patient has hypoxemia in the setting of fatigue, malaise and weakness, low grade fevers associated with nighttime fevers and drenching sweats, dyspnea/hypoxemia and a cough with pleuritic chest pain - CT scan has peripheral opacities in both the upper and lower lobes - it would be unusual for the patient to have developed bacterial pneumonia while on cefepime for a pseudomonal joint infection - suspect that the patient has cryptogenic organizing pneumonia rather than eosinophilic pneumonia which tends to be upper lobe predominant    PLAN/RECOMMENDATIONS:    oxygen supplementation to keep saturation greater than 92% - currently on a 3lpm nasal canula  observe off nebs  Maintaining solumedrol 20mg IVP q6h  today  continue cefepime  discontinued doxycycline as the urine Legionella antigen is negative - RVP is negative  robitussin DM/tessalon/hycodan at bedtime  diligent DVT prophylaxis      Kirk Smith MD, [facility]  [phone]  Pulmonary Medicine

## 2022-10-09 NOTE — PROGRESS NOTE ADULT - SUBJECTIVE AND OBJECTIVE BOX
Follow-up Pulm Progress Note    The patient was seen and examined. Notes reviewed and discussed with staff/team as applicable      No new respiratory events overnight.      Denies: SOB, Chest pain, increased cough, colored phlegm, hemoptysis, N/V/D, neck stiffness, dysuria  ROS otherwise within normal limits    Vital Signs Last 24 Hrs  T(C): 36.8 (09 Oct 2022 17:01), Max: 37.2 (09 Oct 2022 12:59)  T(F): 98.2 (09 Oct 2022 17:01), Max: 99 (09 Oct 2022 12:59)  HR: 96 (09 Oct 2022 17:01) (73 - 96)  BP: 129/91 (09 Oct 2022 17:01) (118/81 - 150/88)  BP(mean): --  RR: 18 (09 Oct 2022 17:01) (18 - 18)  SpO2: 97% (09 Oct 2022 17:01) (93% - 99%)    Parameters below as of 09 Oct 2022 17:01  Patient On (Oxygen Delivery Method): nasal cannula  O2 Flow (L/min): 3            10-08 @ 07:01  -  10-09 @ 07:00  --------------------------------------------------------  IN: 100 mL / OUT: 500 mL / NET: -400 mL          Medications:  MEDICATIONS  (STANDING):  cefepime   IVPB 2000 milliGRAM(s) IV Intermittent every 8 hours  chlorhexidine 4% Liquid 1 Application(s) Topical <User Schedule>  enoxaparin Injectable 40 milliGRAM(s) SubCutaneous every 24 hours  ferrous    sulfate 325 milliGRAM(s) Oral daily  losartan 50 milliGRAM(s) Oral daily  methylPREDNISolone sodium succinate Injectable 20 milliGRAM(s) IV Push every 6 hours  senna 2 Tablet(s) Oral at bedtime  topiramate 50 milliGRAM(s) Oral daily    MEDICATIONS  (PRN):  oxyCODONE    IR 5 milliGRAM(s) Oral every 4 hours PRN Severe Pain (7 - 10)  polyethylene glycol 3350 17 Gram(s) Oral daily PRN Constipation  sodium chloride 0.9% lock flush 10 milliLiter(s) IV Push every 1 hour PRN Pre/post blood products, medications, blood draw, and to maintain line patency      Allergies    No Known Allergies    Intolerances    opioid-like analgesics (Vomiting; Nausea)      Vent settings (if applicable)        Physical Examination:    Pleasant  Neck: no JVD, LAD, accessory muscle use  PULM: Clear to auscultation bilaterally, no wheezes, rales, rhonchi  CVS: Regular rate and rhythm, S1S2, no murmurs, rubs, or gallops  Abdomen:  Extremities:  Neuro:      LABS:                        9.3    26.21 )-----------( 698      ( 09 Oct 2022 05:51 )             29.1     10-09    137  |  105  |  25<H>  ----------------------------<  151<H>  4.9   |  21<L>  |  0.52    Ca    9.7      09 Oct 2022 05:51  Phos  3.8     10-09  Mg     2.3     10-09    TPro  7.4  /  Alb  3.5  /  TBili  0.1<L>  /  DBili  x   /  AST  7<L>  /  ALT  15  /  AlkPhos  81  10-08          CAPILLARY BLOOD GLUCOSE              Serum Pro-Brain Natriuretic Peptide: 48 pg/mL (10-06-22 @ 17:18)      CULTURES:  Culture Results:   No growth to date. (10-07 @ 00:10)  Culture Results:   No growth to date. (10-06 @ 23:40)  Culture Results:   10,000 - 49,000 CFU/mL Gram Positive Cocci in Pairs and Chains (10-06 @ 18:03)    Most recent blood culture -- 10-07 @ 00:10   -- -- .Blood Blood-Peripheral 10-07 @ 00:10  Most recent blood culture -- 10-06 @ 23:40   -- -- .Blood Blood-Peripheral 10-06 @ 23:40  Most recent blood culture -- 10-06 @ 18:03   -- -- Clean Catch Clean Catch (Midstream) 10-06 @ 18:03      RADIOLOGY REVIEWED    CXR:        CT chest:      Other:   Follow-up Pulm Progress Note    The patient was seen and examined. Notes reviewed and discussed with staff/team as applicable      No new respiratory events overnight.      Progressively feeling better.  Chronic sweats at night.     Vital Signs Last 24 Hrs  T(C): 36.8 (09 Oct 2022 17:01), Max: 37.2 (09 Oct 2022 12:59)  T(F): 98.2 (09 Oct 2022 17:01), Max: 99 (09 Oct 2022 12:59)  HR: 96 (09 Oct 2022 17:01) (73 - 96)  BP: 129/91 (09 Oct 2022 17:01) (118/81 - 150/88)  BP(mean): --  RR: 18 (09 Oct 2022 17:01) (18 - 18)  SpO2: 97% (09 Oct 2022 17:01) (93% - 99%)    Parameters below as of 09 Oct 2022 17:01  Patient On (Oxygen Delivery Method): nasal cannula  O2 Flow (L/min): 3            10-08 @ 07:01  -  10-09 @ 07:00  --------------------------------------------------------  IN: 100 mL / OUT: 500 mL / NET: -400 mL          Medications:  MEDICATIONS  (STANDING):  cefepime   IVPB 2000 milliGRAM(s) IV Intermittent every 8 hours  chlorhexidine 4% Liquid 1 Application(s) Topical <User Schedule>  enoxaparin Injectable 40 milliGRAM(s) SubCutaneous every 24 hours  ferrous    sulfate 325 milliGRAM(s) Oral daily  losartan 50 milliGRAM(s) Oral daily  methylPREDNISolone sodium succinate Injectable 20 milliGRAM(s) IV Push every 6 hours  senna 2 Tablet(s) Oral at bedtime  topiramate 50 milliGRAM(s) Oral daily    MEDICATIONS  (PRN):  oxyCODONE    IR 5 milliGRAM(s) Oral every 4 hours PRN Severe Pain (7 - 10)  polyethylene glycol 3350 17 Gram(s) Oral daily PRN Constipation  sodium chloride 0.9% lock flush 10 milliLiter(s) IV Push every 1 hour PRN Pre/post blood products, medications, blood draw, and to maintain line patency      Allergies    No Known Allergies    Intolerances    opioid-like analgesics (Vomiting; Nausea)      Vent settings (if applicable)        Physical Examination:    Pleasant  Neck: no JVD, LAD, accessory muscle use  PULM:  Prolonged expiratory phase, decreased breath sounds at the bases  CVS: Regular rate without rub or gallop  Abdomen:  soft, without obvious tenderness, normoactive bowel sounds  Extremities:  without increased edema, no obvious cyanosis, clubbing or tenderness  Neuro:  alert, appropriately interactive, grossly non-focal      LABS:                        9.3    26.21 )-----------( 698      ( 09 Oct 2022 05:51 )             29.1     10-09    137  |  105  |  25<H>  ----------------------------<  151<H>  4.9   |  21<L>  |  0.52    Ca    9.7      09 Oct 2022 05:51  Phos  3.8     10-09  Mg     2.3     10-09    TPro  7.4  /  Alb  3.5  /  TBili  0.1<L>  /  DBili  x   /  AST  7<L>  /  ALT  15  /  AlkPhos  81  10-08          CAPILLARY BLOOD GLUCOSE              Serum Pro-Brain Natriuretic Peptide: 48 pg/mL (10-06-22 @ 17:18)      CULTURES:  Culture Results:   No growth to date. (10-07 @ 00:10)  Culture Results:   No growth to date. (10-06 @ 23:40)  Culture Results:   10,000 - 49,000 CFU/mL Gram Positive Cocci in Pairs and Chains (10-06 @ 18:03)    Most recent blood culture -- 10-07 @ 00:10   -- -- .Blood Blood-Peripheral 10-07 @ 00:10  Most recent blood culture -- 10-06 @ 23:40   -- -- .Blood Blood-Peripheral 10-06 @ 23:40  Most recent blood culture -- 10-06 @ 18:03   -- -- Clean Catch Clean Catch (Midstream) 10-06 @ 18:03      RADIOLOGY REVIEWED    CXR:        CT chest:      Other:

## 2022-10-09 NOTE — PROGRESS NOTE ADULT - SUBJECTIVE AND OBJECTIVE BOX
CARDIOLOGY     PROGRESS  NOTE   ________________________________________________    CHIEF COMPLAINT:Patient is a 61y old  Female who presents with a chief complaint of abdominal wall hernia non-reducible, nausea (08 Oct 2022 17:16)  no complain,   	  REVIEW OF SYSTEMS:  CONSTITUTIONAL: No fever, weight loss, or fatigue  EYES: No eye pain, visual disturbances, or discharge  ENT:  No difficulty hearing, tinnitus, vertigo; No sinus or throat pain  NECK: No pain or stiffness  RESPIRATORY: No cough, wheezing, chills or hemoptysis; no Shortness of Breath  CARDIOVASCULAR: No chest pain, palpitations, passing out, dizziness, or leg swelling  GASTROINTESTINAL: No abdominal or epigastric pain. No nausea, vomiting, or hematemesis; No diarrhea or constipation. No melena or hematochezia.  GENITOURINARY: No dysuria, frequency, hematuria, or incontinence  NEUROLOGICAL: No headaches, memory loss, loss of strength, numbness, or tremors  SKIN: No itching, burning, rashes, or lesions   LYMPH Nodes: No enlarged glands  ENDOCRINE: No heat or cold intolerance; No hair loss  MUSCULOSKELETAL: No joint pain or swelling; No muscle, back, or extremity pain  PSYCHIATRIC: No depression, anxiety, mood swings, or difficulty sleeping  HEME/LYMPH: No easy bruising, or bleeding gums  ALLERGY AND IMMUNOLOGIC: No hives or eczema	    [ ] All others negative	  [ ] Unable to obtain    PHYSICAL EXAM:  T(C): 36.9 (10-09-22 @ 08:13), Max: 37.1 (10-08-22 @ 17:40)  HR: 84 (10-09-22 @ 08:13) (73 - 118)  BP: 134/85 (10-09-22 @ 08:13) (118/81 - 150/85)  RR: 18 (10-09-22 @ 08:13) (18 - 18)  SpO2: 93% (10-09-22 @ 08:13) (92% - 96%)  Wt(kg): --  I&O's Summary    08 Oct 2022 07:01  -  09 Oct 2022 07:00  --------------------------------------------------------  IN: 100 mL / OUT: 500 mL / NET: -400 mL    09 Oct 2022 07:01  -  09 Oct 2022 09:04  --------------------------------------------------------  IN: 50 mL / OUT: 0 mL / NET: 50 mL        Appearance: Normal	  HEENT:   Normal oral mucosa, PERRL, EOMI	  Lymphatic: No lymphadenopathy  Cardiovascular: Normal S1 S2, No JVD, + murmurs, No edema  Respiratory: rhonchi  Psychiatry: A & O x 3, Mood & affect appropriate  Gastrointestinal:  Soft, Non-tender, + BS	  Skin: No rashes, No ecchymoses, No cyanosis	  Neurologic: Non-focal  Extremities: Normal range of motion, No clubbing, cyanosis or edema  Vascular: Peripheral pulses palpable 2+ bilaterally    MEDICATIONS  (STANDING):  cefepime   IVPB 2000 milliGRAM(s) IV Intermittent every 8 hours  chlorhexidine 4% Liquid 1 Application(s) Topical <User Schedule>  enoxaparin Injectable 40 milliGRAM(s) SubCutaneous every 24 hours  ferrous    sulfate 325 milliGRAM(s) Oral daily  losartan 50 milliGRAM(s) Oral daily  methylPREDNISolone sodium succinate Injectable 20 milliGRAM(s) IV Push every 6 hours  senna 2 Tablet(s) Oral at bedtime  topiramate 50 milliGRAM(s) Oral daily      TELEMETRY: 	    ECG:  	  RADIOLOGY:  OTHER: 	  	  LABS:	 	    CARDIAC MARKERS:                                9.3    26.21 )-----------( 698      ( 09 Oct 2022 05:51 )             29.1     10-09    137  |  105  |  25<H>  ----------------------------<  151<H>  4.9   |  21<L>  |  0.52    Ca    9.7      09 Oct 2022 05:51  Phos  3.8     10-09  Mg     2.3     10-09    TPro  7.4  /  Alb  3.5  /  TBili  0.1<L>  /  DBili  x   /  AST  7<L>  /  ALT  15  /  AlkPhos  81  10-08    proBNP: Serum Pro-Brain Natriuretic Peptide: 48 pg/mL (10-06 @ 17:18)    Lipid Profile:   HgA1c:   TSH:         Assessment and plan  ---------------------------  62 y/o female medical history sig for Asthma, obesity, JOSE LUIS not on NIV, Migraines, HTN who was accepted for transfer from [facility] by Dr. Byrnes for evaluation of previous right THR on 5/2/22. Patient was admitted to [facility] on 8/27 s/p fall and found to have fractured femoral prosthesis/ non union and may need bone graft. She denies any LOC or head trauma from fall, describes fall as leg giving out. She was seen by Cardiology prior to transfer and deemed stable for surgery. Denies any tori-operative issues in 5/22 during last revision.    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 WBAT. PT/ OT. Ortho following . OR Cultures PRELIM rare Pseudomonas. ID consulted and Cefepime started antibiotics through 10/28/22 for total 8 weeks. Will need suppression thereafter.  HTN-  Losartan and hold HCTZ due to soft BP.  Migraine - Cont. Topamax  JOSE LUIS- Not on NIV HS. O/P pulm f/u  Asthma- PRN nebs  DVT Prophylaxis -- Lovenox SQ   Abdominal hernia - reduced, asymptomatic.   dc azithromycin  pulmonary noted started on solumedrol  increase wbc ?sec to initiation of steroid, afebrile  continue abx

## 2022-10-10 LAB
ANION GAP SERPL CALC-SCNC: 12 MMOL/L — SIGNIFICANT CHANGE UP (ref 5–17)
BUN SERPL-MCNC: 27 MG/DL — HIGH (ref 7–23)
CALCIUM SERPL-MCNC: 9.7 MG/DL — SIGNIFICANT CHANGE UP (ref 8.4–10.5)
CHLORIDE SERPL-SCNC: 105 MMOL/L — SIGNIFICANT CHANGE UP (ref 96–108)
CO2 SERPL-SCNC: 21 MMOL/L — LOW (ref 22–31)
CREAT SERPL-MCNC: 0.62 MG/DL — SIGNIFICANT CHANGE UP (ref 0.5–1.3)
EGFR: 101 ML/MIN/1.73M2 — SIGNIFICANT CHANGE UP
GLUCOSE SERPL-MCNC: 141 MG/DL — HIGH (ref 70–99)
HCT VFR BLD CALC: 30.2 % — LOW (ref 34.5–45)
HGB BLD-MCNC: 9.5 G/DL — LOW (ref 11.5–15.5)
MCHC RBC-ENTMCNC: 27.7 PG — SIGNIFICANT CHANGE UP (ref 27–34)
MCHC RBC-ENTMCNC: 31.5 GM/DL — LOW (ref 32–36)
MCV RBC AUTO: 88 FL — SIGNIFICANT CHANGE UP (ref 80–100)
NRBC # BLD: 0 /100 WBCS — SIGNIFICANT CHANGE UP (ref 0–0)
PLATELET # BLD AUTO: 771 K/UL — HIGH (ref 150–400)
POTASSIUM SERPL-MCNC: 4.6 MMOL/L — SIGNIFICANT CHANGE UP (ref 3.5–5.3)
POTASSIUM SERPL-SCNC: 4.6 MMOL/L — SIGNIFICANT CHANGE UP (ref 3.5–5.3)
RBC # BLD: 3.43 M/UL — LOW (ref 3.8–5.2)
RBC # FLD: 15.6 % — HIGH (ref 10.3–14.5)
SARS-COV-2 RNA SPEC QL NAA+PROBE: SIGNIFICANT CHANGE UP
SODIUM SERPL-SCNC: 138 MMOL/L — SIGNIFICANT CHANGE UP (ref 135–145)
WBC # BLD: 19.01 K/UL — HIGH (ref 3.8–10.5)
WBC # FLD AUTO: 19.01 K/UL — HIGH (ref 3.8–10.5)

## 2022-10-10 RX ADMIN — Medication 650 MILLIGRAM(S): at 23:17

## 2022-10-10 RX ADMIN — Medication 325 MILLIGRAM(S): at 14:00

## 2022-10-10 RX ADMIN — Medication 20 MILLIGRAM(S): at 00:03

## 2022-10-10 RX ADMIN — SENNA PLUS 2 TABLET(S): 8.6 TABLET ORAL at 21:30

## 2022-10-10 RX ADMIN — Medication 650 MILLIGRAM(S): at 13:59

## 2022-10-10 RX ADMIN — CEFEPIME 100 MILLIGRAM(S): 1 INJECTION, POWDER, FOR SOLUTION INTRAMUSCULAR; INTRAVENOUS at 17:28

## 2022-10-10 RX ADMIN — ENOXAPARIN SODIUM 40 MILLIGRAM(S): 100 INJECTION SUBCUTANEOUS at 05:07

## 2022-10-10 RX ADMIN — Medication 50 MILLIGRAM(S): at 14:00

## 2022-10-10 RX ADMIN — CEFEPIME 100 MILLIGRAM(S): 1 INJECTION, POWDER, FOR SOLUTION INTRAMUSCULAR; INTRAVENOUS at 10:22

## 2022-10-10 RX ADMIN — Medication 60 MILLIGRAM(S): at 17:12

## 2022-10-10 RX ADMIN — Medication 20 MILLIGRAM(S): at 05:08

## 2022-10-10 RX ADMIN — Medication 650 MILLIGRAM(S): at 14:29

## 2022-10-10 RX ADMIN — Medication 650 MILLIGRAM(S): at 22:47

## 2022-10-10 RX ADMIN — LOSARTAN POTASSIUM 50 MILLIGRAM(S): 100 TABLET, FILM COATED ORAL at 05:08

## 2022-10-10 RX ADMIN — CEFEPIME 100 MILLIGRAM(S): 1 INJECTION, POWDER, FOR SOLUTION INTRAMUSCULAR; INTRAVENOUS at 00:03

## 2022-10-10 RX ADMIN — CHLORHEXIDINE GLUCONATE 1 APPLICATION(S): 213 SOLUTION TOPICAL at 10:22

## 2022-10-10 NOTE — PROGRESS NOTE ADULT - SUBJECTIVE AND OBJECTIVE BOX
[facility] - PROGRESS NOTE    CHIEF COMPLAINT: hypoxemia; asthma; obesity -> obstructive sleep apnea; abnormal chest CT; cryptogenic organizing pneumonia;     INTERVAL HISTORY: much improved except for ongoing nocturnal chills and sweats without fevers; no shortness of breath or hypoxemia on a 1lpm nasal canula; cough is much improved and without sputum production; no chest congestion or wheeze; no fatigue, malaise or weakness: good appetite; minimal anterior chest pain with inspiration;     REVIEW OF SYSTEMS:  Constitutional: As per interval history  HEENT: Within normal limits  CV: As per interval history  Resp: As per interval history  GI: Within normal limits   : Within normal limits  Musculoskeletal: right femoral fracture and repair  Skin: Within normal limits  Neurological: Within normal limits  Psychiatric: Within normal limits  Endocrine: Within normal limits  Hematologic/Lymphatic: Within normal limits  Allergic/Immunologic: Within normal limits    MEDICATIONS:     Pulmonary "    Anti-microbials:  cefepime   IVPB 2000 milliGRAM(s) IV Intermittent every 8 hours    Cardiovascular:  losartan 50 milliGRAM(s) Oral daily    Other:  chlorhexidine 4% Liquid 1 Application(s) Topical <User Schedule>  enoxaparin Injectable 40 milliGRAM(s) SubCutaneous every 24 hours  ferrous    sulfate 325 milliGRAM(s) Oral daily  methylPREDNISolone sodium succinate Injectable 20 milliGRAM(s) IV Push every 6 hours  senna 2 Tablet(s) Oral at bedtime  topiramate 50 milliGRAM(s) Oral daily    MEDICATIONS  (PRN):  acetaminophen     Tablet .. 650 milliGRAM(s) Oral every 6 hours PRN Temp greater or equal to 38C (100.4F), Moderate Pain (4 - 6)  oxyCODONE    IR 5 milliGRAM(s) Oral every 4 hours PRN Severe Pain (7 - 10)  polyethylene glycol 3350 17 Gram(s) Oral daily PRN Constipation  sodium chloride 0.9% lock flush 10 milliLiter(s) IV Push every 1 hour PRN Pre/post blood products, medications, blood draw, and to maintain line patency      OBJECTIVE:    PHYSICAL EXAM:       ICU Vital Signs Last 24 Hrs  T(C): 36.9 (10 Oct 2022 06:45), Max: 37.2 (09 Oct 2022 12:59)  T(F): 98.4 (10 Oct 2022 06:45), Max: 99 (09 Oct 2022 12:59)  HR: 67 (10 Oct 2022 06:45) (63 - 96)  BP: 166/97 (10 Oct 2022 06:45) (123/82 - 166/97)  BP(mean): --  ABP: --  ABP(mean): --  RR: 18 (10 Oct 2022 06:45) (18 - 18)  SpO2: 98% (10 Oct 2022 06:45) (96% - 99%) on 1lpm nasal canula    General: Awake. Alert. Cooperative. No distress. Appears stated age 	  HEENT: Atraumatic. Normocephalic. Anicteric. Normal oral mucosa. PERRL. EOMI. Mallampati class IV airway  Neck: Supple. Trachea midline. Thyroid without enlargement/tenderness/nodules. No carotid bruit. No JVD. Short and wide.  Cardiovascular: Regular rate and rhythm. S1 S2 normal. No murmurs, rubs or gallops.  Respiratory: Respirations unlabored. Decreased rales. No curvature.  Abdomen: Soft. Non-tender. Non-distended. No organomegaly. No masses. Normal bowel sounds.  Extremities: Warm to touch. No clubbing or cyanosis. No pedal edema.  Pulses: 2+ peripheral pulses all extremities.	  Skin: Normal skin color. No rashes or lesions. No ecchymoses. No cyanosis. Warm to touch.  Lymph Nodes: Cervical, supraclavicular and axillary nodes normal  Neurological: Motor and sensory examination equal and normal. A and O x 3  Psychiatry: Appropriate mood and affect.    LABS:                          9.5    19.01 )-----------( 771      ( 10 Oct 2022 05:19 )             30.2     CBC    WBC  19.01 <==, 26.21 <==, 14.82 <==, 14.58 <==    Hemoglobin  9.5 <<==, 9.3 <<==, 9.7 <<==, 9.8 <<==    Hematocrit  30.2 <==, 29.1 <==, 31.1 <==, 31.4 <==    Platelets  771 <==, 698 <==, 681 <==, 663 <==      138  |  105  |  27<H>  ----------------------------<  141<H>    10-10  4.6   |  21<L>  |  0.62      LYTES    sodium  138 <==, 137 <==, 136 <==, 135 <==    potassium   4.6 <==, 4.9 <==, 4.6 <==, 4.7 <==    chloride  105 <==, 105 <==, 104 <==, 102 <==    carbon dioxide  21 <==, 21 <==, 21 <==, 21 <==    =============================================================================================  RENAL FUNCTION:    Creatinine:   0.62  <<==, 0.52  <<==, 0.45  <<==, 0.59  <<==    BUN:   27 <==, 25 <==, 15 <==, 11 <==    ============================================================================================    calcium   9.7 <==, 9.7 <==, 10.0 <==, 9.7 <==    phos   3.8 <==    mag   2.3 <==    ============================================================================================  LFTs    AST:   7 <== , 14 <==     ALT:  15  <== , 13  <==     AP:  81  <=, 84  <=    Bili:  0.1  <=, 0.2  <=    Serum Pro-Brain Natriuretic Peptide: 48 pg/mL (10-06 @ 17:18)    CARDIAC MARKERS ( 06 Oct 2022 17:18 )  CPK x     /CKMB x     /CKMB Units x        troponin 25 ng/L      MICROBIOLOGY:     Respiratory Viral Panel with COVID-19 by TARA (10.06.22 @ 17:24)   Rapid RVP Result: [facility]   SARS-CoV-2: [facility]  This Respiratory Panel uses polymerase chain reaction (PCR) to detect for adenovirus; coronavirus (HKU1, NL63, 229E, OC43); human metapneumovirus (hMPV); human enterovirus/rhinovirus (Entero/RV); influenza A; influenza A/H1; influenza A/H3; influenza A/H1-2009; influenza B; parainfluenza viruses 1, 2, 3, 4; respiratory syncytial virus; Mycoplasma pneumoniae; Chlamydophila pneumoniae; and SARS-CoV-2.   Legionella pneumophila Antigen, Urine (10.07.22 @ 19:52)   Legionella Antigen, Urine: Negative:    Culture - Blood (10.07.22 @ 00:10)   Specimen Source: .Blood Blood-Peripheral   Culture Results:   No growth to date.     Culture - Blood (10.06.22 @ 23:40)   Specimen Source: .Blood Blood-Peripheral   Culture Results:   No growth to date.     Urinalysis Basic - ( 06 Oct 2022 18:03 )    Color: Light Yellow / Appearance: Clear / S.010 / pH: x  Gluc: x / Ketone: Negative  / Bili: Negative / Urobili: Negative   Blood: x / Protein: Trace / Nitrite: Negative   Leuk Esterase: Negative / RBC: 1 /hpf / WBC 3 /HPF   Sq Epi: x / Non Sq Epi: 6 /hpf / Bacteria: Negative    Culture - Urine (10.06.22 @ 18:03)   Specimen Source: Clean Catch Clean Catch (Midstream)   Culture Results:   10,000 - 49,000 CFU/mL Gram Positive Cocci in Pairs and Chains     Culture - Tissue with Gram Stain (22 @ 08:59)   - Piperacillin/Tazobactam: S <=8   - Tobramycin: S <=2   Gram Stain:   No polymorphonuclear leukocytes per low power field   No organisms seen per oil power field   - Amikacin: S <=16   - Aztreonam: S <=4   - Cefepime: S <=2   - Ceftazidime: S <=1   - Ciprofloxacin: S <=0.25   - Gentamicin: S <=2   - Imipenem: S <=1   - Levofloxacin: S <=0.5   - Meropenem: S <=1   Specimen Source: .Tissue Right hip Bone Culture   Culture Results:   Rare Pseudomonas aeruginosa   Organism Identification: Pseudomonas aeruginosa   Organism: Pseudomonas aeruginosa   Method Type: PB     RADIOLOGY:  [x ] Chest radiographs reviewed and interpreted by me    EXAM:  XR CHEST PA LAT 2V                          PROCEDURE DATE:  10/06/2022      FINDINGS:  Scattered bilateral opacities may represent multifocal pneumonia in the appropriate clinical setting. Small left pleural effusion with associated basilar atelectasis.  The heart is normal in size.  There is no pneumothorax.    IMPRESSION:  Scattered bilateral opacities may represent multifocal pneumonia in the appropriate clinical setting. Small left pleural effusion with associated   basilar atelectasis.    ELLYN JOSHI MD; Resident Radiologist  This document has been electronically signed.  SOFI ZHENG MD; Attending Radiologist  This document has been electronically signed. Oct  7 2022  8:30AM  ---------------------------------------------------------------------------------------------------------------  EXAM:  CT ABDOMEN AND PELVIS IC                        [facility]                          PROCEDURE DATE:  10/06/2022      FINDINGS:  CHEST:  LUNGS AND LARGE AIRWAYS: Patent central airways. Multiple bilateral peripheral infiltrates suggestive of Covid pneumonia. Please correlate with Covid status.  PLEURA: No pleural effusion.  VESSELS: Left-sided PICC line is seen with the tip in the superior vena cava  HEART: Heart size is normal. No pericardial effusion.  MEDIASTINUM AND DANA: No lymphadenopathy.  CHEST WALL AND LOWER NECK: Within normal limits.    ABDOMEN AND PELVIS:  LIVER: Within normal limits.  BILE DUCTS: Normal caliber.  GALLBLADDER: Within normal limits.  SPLEEN: Within normal limits.  PANCREAS: Within normal limits.  ADRENALS: Within normal limits.  KIDNEYS/URETERS: Within normal limits.    BLADDER: Within normal limits.  REPRODUCTIVE ORGANS: Uterus and adnexa within normal limits.    BOWEL: No bowel obstruction. Appendix is not visualized. No evidence of   inflammation in the pericecal region.  PERITONEUM: No ascites.  VESSELS: Atherosclerotic changes.  RETROPERITONEUM/LYMPH NODES: No lymphadenopathy.  ABDOMINAL WALL: Supraumbilical fat-containing hernia to left of midline.   Small fat-containing umbilical hernia also seen.  BONES: Degenerative changes.    IMPRESSION: No evidence of pulmonary embolism.  Extensive bilateral peripheral infiltrates may be seen in the setting of Covid pneumonia. Please correlate with Covid testing.  No acute pathology in the abdomen and pelvis small fat-containing ventral hernias are appreciated    HEAVEN PADGETT MD; Attending Radiologist  This document has been electronically signed. Oct  6 2022  6:52PM  ---------------------------------------------------------------------------------------------------------------

## 2022-10-10 NOTE — PROGRESS NOTE ADULT - ASSESSMENT
ASSESSMENT:    61 year old gentlewoman, lifelong non-smoker, with history of obesity related obstructive sleep apnea (non-adherent with prescribed CPAP) and mild asthma on albuterol as needed. The patient underwent a right hip replacement revision in May. Due to non-healing, she underwent implantation of a proximal femur hook plate, cables and an allograft femur strut in September. She has been on cefepime for ~ 5 weeks due to pseudomonas found on tissue culture. She was discharged to rehab but returned to the ER yesterday with abdominal discomfort due to a hernia that has been reduced. She has no abdominal pain at present. The patient has been feeling unwell for ~ 1 week. She has fatigue, malaise and weakness. She has been having low grade fevers associated with nighttime fevers and drenching sweats. She has been short of breath with a cough especially when inspiring. She has anterior chest pain exacerbated by cough and breathing. She has no chest congestion or wheeze. In the ER, the patient was found to be hypoxic.     CT scan ->  no evidence of pulmonary embolism - extensive bilateral peripheral infiltrates may be seen in the setting of Covid pneumonia - no acute pathology in the abdomen and pelvis - small fat-containing ventral hernias are appreciated    the patient has hypoxemia in the setting of fatigue, malaise and weakness, low grade fevers associated with nighttime chills and drenching sweats, dyspnea/hypoxemia and a cough with pleuritic chest pain - CT scan has peripheral opacities in both the upper and lower lobes - it would be unusual for the patient to have developed bacterial pneumonia while on cefepime for a pseudomonal joint infection - suspect that the patient has cryptogenic organizing pneumonia rather than eosinophilic pneumonia which tends to be upper lobe predominant    10/10 - much improved except for ongoing nocturnal chills and sweats without fevers; no shortness of breath or hypoxemia on a 1lpm nasal canula; cough is much improved and without sputum production; no chest congestion or wheeze; no fatigue, malaise or weakness: good appetite; minimal anterior chest pain with inspiration;     thrombocytosis     leukocytosis more likely related to high dose steroids than infection    PLAN/RECOMMENDATIONS:    oxygen supplementation to keep saturation greater than 92% - currently on a 1lpm nasal canula -> trial on room air  observe off nebs  transition solumedrol -> prednisone 1mg/kg -> taper by 10mg weekly  continue cefepime  doxycycline discontinued - the urine Legionella antigen is negative and the RVP is negative  robitussin DM/tessalon/hycodan at bedtime  diligent DVT prophylaxis - SQ lovenox    Thank you for the courtesy of this referral. Plan of care discussed with the patient at bedside. Discharge planning. The patient will follow-up with Dr. Kirk Smith in our office in ~ 3 weeks - earlier if her chills and night sweats do not resolve.    Chris Lomas MD, [facility]  [phone]  Pulmonary Medicine

## 2022-10-10 NOTE — CONSULT NOTE ADULT - ATTENDING COMMENTS
I saw and examined the patient, and reviewed  the history with the patient and   Agree with note which was also reviewed and edited where appropriate.  D/W patient, RN, residents and Fellow  follow up as out pt for umbilical hernia repair

## 2022-10-10 NOTE — PROGRESS NOTE ADULT - SUBJECTIVE AND OBJECTIVE BOX
Patient is a 61y old  Female who presents with a chief complaint of abdominal wall hernia non-reducible, nausea (09 Oct 2022 17:06)      INTERVAL HPI/OVERNIGHT EVENTS: No event overnight. Vitals stable still on nasal cannula 1L. BP mildly elevated. Labs with leukocytosis  which is improving, partly induced by steroid. Legionella antigen , Blood cx, U  cx, COVID all negative. Evaluated by pulmonologist who recommended to  taper IV Solumedrol to Prednisone 1 mg/kg PO then taper by 10 mg weekly. Continue Cefepime, off of Doxycycline. Continue Robitussin, Tessalon, Hycodan at bedtime. Continue SQ Lovenox. Patient is medically clear for discharge. F/u Dr Smith pulmonologist in 3 weeks.     Pain Location & Control: OK    MEDICATIONS  (STANDING):  cefepime   IVPB 2000 milliGRAM(s) IV Intermittent every 8 hours  chlorhexidine 4% Liquid 1 Application(s) Topical <User Schedule>  enoxaparin Injectable 40 milliGRAM(s) SubCutaneous every 24 hours  ferrous    sulfate 325 milliGRAM(s) Oral daily  losartan 50 milliGRAM(s) Oral daily  predniSONE   Tablet   Oral   predniSONE   Tablet 60 milliGRAM(s) Oral daily  senna 2 Tablet(s) Oral at bedtime  topiramate 50 milliGRAM(s) Oral daily    MEDICATIONS  (PRN):  acetaminophen     Tablet .. 650 milliGRAM(s) Oral every 6 hours PRN Temp greater or equal to 38C (100.4F), Moderate Pain (4 - 6)  oxyCODONE    IR 5 milliGRAM(s) Oral every 4 hours PRN Severe Pain (7 - 10)  polyethylene glycol 3350 17 Gram(s) Oral daily PRN Constipation  sodium chloride 0.9% lock flush 10 milliLiter(s) IV Push every 1 hour PRN Pre/post blood products, medications, blood draw, and to maintain line patency      Allergies    No Known Allergies    Intolerances    opioid-like analgesics (Vomiting; Nausea)      REVIEW OF SYSTEMS:  CONSTITUTIONAL: No fever, weight loss, or fatigue  EYES: No eye pain, visual disturbances, or discharge  ENMT:  No difficulty hearing, tinnitus, vertigo; No sinus or throat pain  NECK: No pain or stiffness  BREASTS: No pain, masses, or nipple discharge  RESPIRATORY: No cough, wheezing, chills or hemoptysis; no  shortness of breath  CARDIOVASCULAR: No chest pain, palpitations, dizziness, or leg swelling  GASTROINTESTINAL: No abdominal or epigastric pain. No nausea, vomiting, or hematemesis; No diarrhea or constipation. No melena or hematochezia.  GENITOURINARY: No dysuria, frequency, hematuria, or incontinence  NEUROLOGICAL: No headaches, memory loss, loss of strength, numbness, or tremors  SKIN: No itching, burning, rashes, or lesions   LYMPH NODES: No enlarged glands  ENDOCRINE: No heat or cold intolerance; No hair loss; No polydipsia or polyuria  MUSCULOSKELETAL: No back pain  PSYCHIATRIC: No depression, anxiety, mood swings, or difficulty sleeping  HEME/LYMPH: No easy bruising, or bleeding gums  ALLERGY AND IMMUNOLOGIC: No hives or eczema    Vital Signs Last 24 Hrs  T(C): 36.1 (10 Oct 2022 09:32), Max: 37.2 (09 Oct 2022 12:59)  T(F): 97 (10 Oct 2022 09:32), Max: 99 (09 Oct 2022 12:59)  HR: 88 (10 Oct 2022 09:32) (63 - 96)  BP: 155/88 (10 Oct 2022 09:32) (123/82 - 166/97)  BP(mean): --  RR: 18 (10 Oct 2022 09:32) (18 - 18)  SpO2: 94% (10 Oct 2022 09:32) (94% - 99%)    Parameters below as of 10 Oct 2022 09:32  Patient On (Oxygen Delivery Method): nasal cannula  O2 Flow (L/min): 1      PHYSICAL EXAM:  GENERAL: NAD, well-groomed, well-developed  HEAD:  Atraumatic, Normocephalic  EYES: EOMI, PERRLA, conjunctiva and sclera clear  ENMT: No tonsillar erythema, exudates, or enlargement; Moist mucous membranes, Good dentition, No lesions  NECK: Supple, No JVD, Normal thyroid  NERVOUS SYSTEM:  Alert & Oriented X3, Good concentration; Motor Strength 5/5 B/L upper and lower extremities; DTRs 2+ intact and symmetric  CHEST/LUNG: Clear to auscultation bilaterally; No rales, rhonchi, wheezing, or rubs  HEART: Regular rate and rhythm; No murmurs, rubs, or gallops  ABDOMEN: Soft, Nontender, Nondistended; Bowel sounds present  EXTREMITIES:  2+ Peripheral Pulses, No clubbing or cyanosis  LYMPH: No lymphadenopathy noted  SKIN: R hip surgical wound healing      LABS:                        9.5    19.01 )-----------( 771      ( 10 Oct 2022 05:19 )             30.2     10 Oct 2022 05:19    138    |  105    |  27     ----------------------------<  141    4.6     |  21     |  0.62     Ca    9.7        10 Oct 2022 05:19          CAPILLARY BLOOD GLUCOSE            Cultures  Culture Results:   No growth to date. (10-07-22 @ 00:10)  Culture Results:   No growth to date. (10-06-22 @ 23:40)  Culture Results:   10,000 - 49,000 CFU/mL Gram Positive Cocci in Pairs and Chains (10-06-22 @ 18:03)      RADIOLOGY & ADDITIONAL TESTS:    Imaging Personally Reviewed:  [X ] YES  [ ] NO    Consultant(s) Notes Reviewed:  [ X] YES  [ ] NO    Care Discussed with Consultants/Other Providers [X ] YES  [ ] NO Patient is a 61y old  Female who presents with a chief complaint of abdominal wall hernia non-reducible, nausea (09 Oct 2022 17:06)      INTERVAL HPI/OVERNIGHT EVENTS: No event overnight. Vitals stable still on nasal cannula 1L. BP mildly elevated. Labs with leukocytosis  which is improving, partly induced by steroid. Legionella antigen , Blood cx, U  cx, COVID all negative. Evaluated by pulmonologist who recommended to  taper IV Solumedrol to Prednisone 1 mg/kg PO then taper by 10 mg weekly. Continue Cefepime, off of Doxycycline. Continue Robitussin, Tessalon, Hycodan at bedtime. Continue SQ Lovenox. Patient is medically clear for discharge. F/u Dr Smith pulmonologist in 3 weeks. Patient complained of some abdominal discomfort her abdominal hernia is painful and mildly tender, reducible.  Patient had nausea and vomiting x 2. Consult with surgeon  to look at this abdominal hernia. Patient  still complaining of night sweats will check Quantiferon to r/o TB.    Pain Location & Control: OK    MEDICATIONS  (STANDING):  cefepime   IVPB 2000 milliGRAM(s) IV Intermittent every 8 hours  chlorhexidine 4% Liquid 1 Application(s) Topical <User Schedule>  enoxaparin Injectable 40 milliGRAM(s) SubCutaneous every 24 hours  ferrous    sulfate 325 milliGRAM(s) Oral daily  losartan 50 milliGRAM(s) Oral daily  predniSONE   Tablet   Oral   predniSONE   Tablet 60 milliGRAM(s) Oral daily  senna 2 Tablet(s) Oral at bedtime  topiramate 50 milliGRAM(s) Oral daily    MEDICATIONS  (PRN):  acetaminophen     Tablet .. 650 milliGRAM(s) Oral every 6 hours PRN Temp greater or equal to 38C (100.4F), Moderate Pain (4 - 6)  oxyCODONE    IR 5 milliGRAM(s) Oral every 4 hours PRN Severe Pain (7 - 10)  polyethylene glycol 3350 17 Gram(s) Oral daily PRN Constipation  sodium chloride 0.9% lock flush 10 milliLiter(s) IV Push every 1 hour PRN Pre/post blood products, medications, blood draw, and to maintain line patency      Allergies    No Known Allergies    Intolerances    opioid-like analgesics (Vomiting; Nausea)      REVIEW OF SYSTEMS:  CONSTITUTIONAL: No fever, weight loss, or fatigue  EYES: No eye pain, visual disturbances, or discharge  ENMT:  No difficulty hearing, tinnitus, vertigo; No sinus or throat pain  NECK: No pain or stiffness  BREASTS: No pain, masses, or nipple discharge  RESPIRATORY: No cough, wheezing, chills or hemoptysis; no  shortness of breath  CARDIOVASCULAR: No chest pain, palpitations, dizziness, or leg swelling  GASTROINTESTINAL: No abdominal or epigastric pain. No nausea, vomiting, or hematemesis; No diarrhea or constipation. No melena or hematochezia.  GENITOURINARY: No dysuria, frequency, hematuria, or incontinence  NEUROLOGICAL: No headaches, memory loss, loss of strength, numbness, or tremors  SKIN: No itching, burning, rashes, or lesions   LYMPH NODES: No enlarged glands  ENDOCRINE: No heat or cold intolerance; No hair loss; No polydipsia or polyuria  MUSCULOSKELETAL: No back pain  PSYCHIATRIC: No depression, anxiety, mood swings, or difficulty sleeping  HEME/LYMPH: No easy bruising, or bleeding gums  ALLERGY AND IMMUNOLOGIC: No hives or eczema    Vital Signs Last 24 Hrs  T(C): 36.1 (10 Oct 2022 09:32), Max: 37.2 (09 Oct 2022 12:59)  T(F): 97 (10 Oct 2022 09:32), Max: 99 (09 Oct 2022 12:59)  HR: 88 (10 Oct 2022 09:32) (63 - 96)  BP: 155/88 (10 Oct 2022 09:32) (123/82 - 166/97)  BP(mean): --  RR: 18 (10 Oct 2022 09:32) (18 - 18)  SpO2: 94% (10 Oct 2022 09:32) (94% - 99%)    Parameters below as of 10 Oct 2022 09:32  Patient On (Oxygen Delivery Method): nasal cannula  O2 Flow (L/min): 1      PHYSICAL EXAM:  GENERAL: NAD, well-groomed, well-developed  HEAD:  Atraumatic, Normocephalic  EYES: EOMI, PERRLA, conjunctiva and sclera clear  ENMT: No tonsillar erythema, exudates, or enlargement; Moist mucous membranes, Good dentition, No lesions  NECK: Supple, No JVD, Normal thyroid  NERVOUS SYSTEM:  Alert & Oriented X3, Good concentration; Motor Strength 5/5 B/L upper and lower extremities; DTRs 2+ intact and symmetric  CHEST/LUNG: Clear to auscultation bilaterally; No rales, rhonchi, wheezing, or rubs  HEART: Regular rate and rhythm; No murmurs, rubs, or gallops  ABDOMEN: Soft, Nontender, Nondistended; Bowel sounds present  EXTREMITIES:  2+ Peripheral Pulses, No clubbing or cyanosis  LYMPH: No lymphadenopathy noted  SKIN: R hip surgical wound healing      LABS:                        9.5    19.01 )-----------( 771      ( 10 Oct 2022 05:19 )             30.2     10 Oct 2022 05:19    138    |  105    |  27     ----------------------------<  141    4.6     |  21     |  0.62     Ca    9.7        10 Oct 2022 05:19          CAPILLARY BLOOD GLUCOSE            Cultures  Culture Results:   No growth to date. (10-07-22 @ 00:10)  Culture Results:   No growth to date. (10-06-22 @ 23:40)  Culture Results:   10,000 - 49,000 CFU/mL Gram Positive Cocci in Pairs and Chains (10-06-22 @ 18:03)      RADIOLOGY & ADDITIONAL TESTS:    Imaging Personally Reviewed:  [X ] YES  [ ] NO    Consultant(s) Notes Reviewed:  [ X] YES  [ ] NO    Care Discussed with Consultants/Other Providers [X ] YES  [ ] NO

## 2022-10-10 NOTE — CONSULT NOTE ADULT - SUBJECTIVE AND OBJECTIVE BOX
GENERAL SURGERY CONSULT  ========================    HPI:  62 y/o female medical history sig for Asthma, obesity, JOSE LUIS not on NIV, Migraines, HTN who was accepted for transfer from [facility] by Dr. Byrnes for evaluation of previous right THR on 22. Patient was admitted to [facility] on  s/p fall and found to have fractured femoral prosthesis/ non union and may need bone graft. She denies any LOC or head trauma from fall, describes fall as leg giving out. She was seen by Cardiology prior to transfer and deemed stable for surgery. Denies any tori-operative issues in  during last revision.Patient transferred for abdominal hernia, non-reducible but reduced in ER. Patient was hypoxic in ER. CT chest was negative for PE but showed multifocal infiltration. Started on IV Ceftriaxone and Zithromycin.     General surgery consulted for umbilical hernia. Patient report it's been there for a few months and spontaneously reduces. Occasionally it protrudes and is tender, which makes her nauseous. Denies current nausea, vomiting or abdominal pain. Tolerating diet. Endorses flatus or BM. Has never seen a surgeon for elective repair. Denies history of abdominal surgery.      PAST MEDICAL & SURGICAL HISTORY:  Failure of right total hip arthroplasty, initial encounter      Hypertension      OA (osteoarthritis)      Broken internal right hip prosthesis, initial encounter      JOSE LUIS (obstructive sleep apnea)      Mild asthma      History of bunionectomy  right foot       S/P excision of lipoma  [id number]      History of total right hip replacement  2017 at [facility]      H/O  section            MEDICATIONS  (STANDING):  cefepime   IVPB 2000 milliGRAM(s) IV Intermittent every 8 hours  chlorhexidine 4% Liquid 1 Application(s) Topical <User Schedule>  enoxaparin Injectable 40 milliGRAM(s) SubCutaneous every 24 hours  ferrous    sulfate 325 milliGRAM(s) Oral daily  losartan 50 milliGRAM(s) Oral daily  predniSONE   Tablet   Oral   predniSONE   Tablet 60 milliGRAM(s) Oral daily  senna 2 Tablet(s) Oral at bedtime  topiramate 50 milliGRAM(s) Oral daily      ALLERGIES:  Intolerances    opioid-like analgesics (Vomiting; Nausea)    ___________________________________________  REVIEW OF SYSTEMS:  All ROS negative except as per HPI.    ___________________________________________  VITALS:  Vital Signs Last 24 Hrs  T(C): 36.9 (10 Oct 2022 16:28), Max: 36.9 (10 Oct 2022 06:45)  T(F): 98.4 (10 Oct 2022 16:), Max: 98.4 (10 Oct 2022 06:45)  HR: 78 (10 Oct 2022 16:) (63 - 88)  BP: 134/88 (10 Oct 2022 16:) (123/82 - 166/97)  BP(mean): --  RR: 18 (10 Oct 2022 16:) (18 - 20)  SpO2: 96% (10 Oct 2022 16:) (92% - 98%)    Parameters below as of 10 Oct 2022 16:28  Patient On (Oxygen Delivery Method): room air        I&O's Detail    09 Oct 2022 07:01  -  10 Oct 2022 07:00  --------------------------------------------------------  IN:    IV PiggyBack: 200 mL    Oral Fluid: 500 mL  Total IN: 700 mL    OUT:  Total OUT: 0 mL    Total NET: 700 mL      10 Oct 2022 07:01  -  10 Oct 2022 19:17  --------------------------------------------------------  IN:    Oral Fluid: 690 mL  Total IN: 690 mL    OUT:    Blood Loss (mL): 0 mL    Voided (mL): 0 mL  Total OUT: 0 mL    Total NET: 690 mL          PHYSICAL EXAM:  General: AAOx3, no acute distress.  Respiratory: breathing comfortably, no increased WOB   Abdomen: soft, nontender, nondistended, no rebound, no guarding, no hernia present on exam but hernia defect appreciated superior to umbilicus  Extremities: Moves all four.     ____________________________________________  LABS:  CBC Full  -  ( 10 Oct 2022 05:19 )  WBC Count : 19.01 K/uL  RBC Count : 3.43 M/uL  Hemoglobin : 9.5 g/dL  Hematocrit : 30.2 %  Platelet Count - Automated : 771 K/uL  Mean Cell Volume : 88.0 fl  Mean Cell Hemoglobin : 27.7 pg  Mean Cell Hemoglobin Concentration : 31.5 gm/dL  Auto Neutrophil # : x  Auto Lymphocyte # : x  Auto Monocyte # : x  Auto Eosinophil # : x  Auto Basophil # : x  Auto Neutrophil % : x  Auto Lymphocyte % : x  Auto Monocyte % : x  Auto Eosinophil % : x  Auto Basophil % : x    10-10    138  |  105  |  27<H>  ----------------------------<  141<H>  4.6   |  21<L>  |  0.62    Ca    9.7      10 Oct 2022 05:19  Phos  3.8     10-09  Mg     2.3     10-09      ____________________________________________  RADIOLOGY & ADDITIONAL STUDIES:  < from: CT Abdomen and Pelvis w/ IV Cont (10.06.22 @ 18:33) >  ACC: [id number] EXAM:  CT ABDOMEN AND PELVIS IC                        ACC: [id number] EXAM:  CT ANGIO CHEST PULM [facility]                          PROCEDURE DATE:  10/06/2022          INTERPRETATION:  CLINICAL INFORMATION: Abdominal pain with possible   umbilical hernia. Suspect pulmonary embolism    COMPARISON: None.    CONTRAST/COMPLICATIONS:  IV Contrast: Omnipaque 350 (accession [id number]), IV contrast documented   in associated exam (accession [id number])  86 cc administered   4 cc   discarded  Oral Contrast: NONE  Complications: None reported at time of study completion    PROCEDURE:  CT Angiography of the Chest was performed followed by portal venous phase   imaging of the Abdomen and Pelvis.  Sagittal and coronal reformats were performedas well as 3D (MIP)   reconstructions.    FINDINGS:  CHEST:  LUNGS AND LARGE AIRWAYS: Patent central airways. Multiple bilateral   peripheral infiltrates suggestive of Covid pneumonia. Please correlate   with Covid status.  PLEURA: No pleural effusion.  VESSELS: Left-sided PICC line is seen with the tip in the superior vena   cava  HEART: Heart size is normal. No pericardial effusion.  MEDIASTINUM AND DANA: No lymphadenopathy.  CHEST WALL AND LOWER NECK: Within normal limits.    ABDOMEN AND PELVIS:  LIVER: Within normal limits.  BILE DUCTS: Normal caliber.  GALLBLADDER: Within normal limits.  SPLEEN: Within normal limits.  PANCREAS: Within normal limits.  ADRENALS: Within normal limits.  KIDNEYS/URETERS: Within normal limits.    BLADDER: Within normal limits.  REPRODUCTIVE ORGANS: Uterus and adnexa within normal limits.    BOWEL: No bowel obstruction. Appendix is not visualized. No evidence of   inflammation in the pericecal region.  PERITONEUM: No ascites.  VESSELS: Atherosclerotic changes.  RETROPERITONEUM/LYMPH NODES: No lymphadenopathy.  ABDOMINAL WALL: Supraumbilical fat-containing hernia to left of midline.   Small fat-containing umbilical hernia also seen.  BONES: Degenerative changes.    IMPRESSION: No evidence of pulmonary embolism.  Extensive bilateral peripheral infiltrates may be seen in the setting of   Covid pneumonia. Please correlate with Covid testing.  No acute pathology in the abdomen and pelvis small fat-containing ventral   hernias are appreciated    < end of copied text >

## 2022-10-10 NOTE — PROGRESS NOTE ADULT - ASSESSMENT
62 y/o female medical history sig for Asthma, obesity, JOSE LUIS not on NIV, Migraines, HTN who was accepted for transfer from [facility] by Dr. Byrnes for evaluation of previous right THR on 5/2/22. Patient was admitted to [facility] on 8/27 s/p fall and found to have fractured femoral prosthesis/ non union and may need bone graft. She denies any LOC or head trauma from fall, describes fall as leg giving out. She was seen by Cardiology prior to transfer and deemed stable for surgery. Denies any tori-operative issues in 5/22 during last revision.    cryptogenic organizing PNA- Continue Robitussin, Tessalon, Hycodan at bedtime. Continue SQ Lovenox. Continue Prednisone.   acute hypoxic respiratory failure due to  PNA-improved on 1L nasal cannula only. Continue wean her off on oxygen.   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 WBAT. PT/ OT. Ortho following . OR Cultures PRELIM rare Pseudomonas. ID consulted and Cefepime started antibiotics through 10/28/22 for total 8 weeks. Will need suppression thereafter.  HTN-  Losartan and hold HCTZ due to soft BP.  Migraine - Cont. Topamax  JOSE LUIS- Not on NIV HS. O/P pulm f/u  Asthma- PRN nebs  DVT Prophylaxis -- Lovenox SQ   Abdominal hernia - reduced, asymptomatic.  60 y/o female medical history sig for Asthma, obesity, JOSE LUIS not on NIV, Migraines, HTN who was accepted for transfer from [facility] by Dr. Byrnes for evaluation of previous right THR on 5/2/22. Patient was admitted to [facility] on 8/27 s/p fall and found to have fractured femoral prosthesis/ non union and may need bone graft. She denies any LOC or head trauma from fall, describes fall as leg giving out. She was seen by Cardiology prior to transfer and deemed stable for surgery. Denies any tori-operative issues in 5/22 during last revision.    cryptogenic organizing PNA- Continue Robitussin, Tessalon, Hycodan at bedtime. Continue SQ Lovenox. Continue Prednisone.   acute hypoxic respiratory failure due to  PNA-improved on 1L nasal cannula only. Continue wean her off on oxygen.   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 WBAT. PT/ OT. Ortho following . OR Cultures PRELIM rare Pseudomonas. ID consulted and Cefepime started antibiotics through 10/28/22 for total 8 weeks. Will need suppression thereafter.  HTN-  Losartan and hold HCTZ due to soft BP.  Migraine - Cont. Topamax  JOSE LUIS- Not on NIV HS. O/P pulm f/u  Asthma- PRN nebs  DVT Prophylaxis -- Lovenox SQ   Abdominal hernia - reduced, asymptomatic. F/u with surgery for recurrent pain and tenderness and nausea and vomiting.

## 2022-10-10 NOTE — CONSULT NOTE ADULT - ASSESSMENT
62 y/o female medical history sig for Asthma, obesity, JOSE LUIS not on NIV, Migraines, HTN, R hip replacement (2017) c/b prosthesis fracture s/p revision 5/2022 c/b non-healing wound s/p re-do revision 9/2022 with pseudomonal wound infection discharged to rehab and now admitted with PNA. Surgery consulted for incidental reducible umbilical hernia.    Plan/Recommendations:  - No acute surgical intervention. Patient needs to fully recover from current pneumonia and infections prior to any elective hernia repair  - Recommend outpatient follow-up for hernia repair with office of Dr. Graham Meier    D/w fellow on behalf of attending    WINTER Mehta, PGY3  Green Team Surgery [id number]

## 2022-10-11 RX ADMIN — LOSARTAN POTASSIUM 50 MILLIGRAM(S): 100 TABLET, FILM COATED ORAL at 05:30

## 2022-10-11 RX ADMIN — Medication 50 MILLIGRAM(S): at 13:03

## 2022-10-11 RX ADMIN — CEFEPIME 100 MILLIGRAM(S): 1 INJECTION, POWDER, FOR SOLUTION INTRAMUSCULAR; INTRAVENOUS at 02:02

## 2022-10-11 RX ADMIN — Medication 60 MILLIGRAM(S): at 05:29

## 2022-10-11 RX ADMIN — CEFEPIME 100 MILLIGRAM(S): 1 INJECTION, POWDER, FOR SOLUTION INTRAMUSCULAR; INTRAVENOUS at 18:31

## 2022-10-11 RX ADMIN — Medication 325 MILLIGRAM(S): at 13:04

## 2022-10-11 RX ADMIN — CEFEPIME 100 MILLIGRAM(S): 1 INJECTION, POWDER, FOR SOLUTION INTRAMUSCULAR; INTRAVENOUS at 09:22

## 2022-10-11 RX ADMIN — Medication 650 MILLIGRAM(S): at 13:34

## 2022-10-11 RX ADMIN — CHLORHEXIDINE GLUCONATE 1 APPLICATION(S): 213 SOLUTION TOPICAL at 09:23

## 2022-10-11 RX ADMIN — SENNA PLUS 2 TABLET(S): 8.6 TABLET ORAL at 21:33

## 2022-10-11 RX ADMIN — Medication 650 MILLIGRAM(S): at 13:04

## 2022-10-11 RX ADMIN — Medication 650 MILLIGRAM(S): at 20:47

## 2022-10-11 RX ADMIN — Medication 650 MILLIGRAM(S): at 20:17

## 2022-10-11 RX ADMIN — ENOXAPARIN SODIUM 40 MILLIGRAM(S): 100 INJECTION SUBCUTANEOUS at 05:30

## 2022-10-11 NOTE — PROGRESS NOTE ADULT - ASSESSMENT
ASSESSMENT:    61 year old gentlewoman, lifelong non-smoker, with history of obesity related obstructive sleep apnea (non-adherent with prescribed CPAP) and mild asthma on albuterol as needed. The patient underwent a right hip replacement revision in May. Due to non-healing, she underwent implantation of a proximal femur hook plate, cables and an allograft femur strut in September. She has been on cefepime for ~ 5 weeks due to pseudomonas found on tissue culture. She was discharged to rehab but returned to the ER yesterday with abdominal discomfort due to a hernia that has been reduced. She has no abdominal pain at present. The patient has been feeling unwell for ~ 1 week. She has fatigue, malaise and weakness. She has been having low grade fevers associated with nighttime fevers and drenching sweats. She has been short of breath with a cough especially when inspiring. She has anterior chest pain exacerbated by cough and breathing. She has no chest congestion or wheeze. In the ER, the patient was found to be hypoxic.     CT scan ->  no evidence of pulmonary embolism - extensive bilateral peripheral infiltrates may be seen in the setting of Covid pneumonia - no acute pathology in the abdomen and pelvis - small fat-containing ventral hernias are appreciated    the patient has hypoxemia in the setting of fatigue, malaise and weakness, low grade fevers associated with nighttime chills and drenching sweats, dyspnea/hypoxemia and a cough with pleuritic chest pain - CT scan has peripheral opacities in both the upper and lower lobes - it would be unusual for the patient to have developed bacterial pneumonia while on cefepime for a pseudomonal joint infection - suspect that the patient has cryptogenic organizing pneumonia rather than eosinophilic pneumonia which tends to be upper lobe predominant    10/10 - much improved except for ongoing nocturnal chills and sweats without fevers; no shortness of breath or hypoxemia on a 1lpm nasal canula; cough is much improved and without sputum production; no chest congestion or wheeze; no fatigue, malaise or weakness: good appetite; minimal anterior chest pain with inspiration;     thrombocytosis     leukocytosis more likely related to high dose steroids than infection    PLAN/RECOMMENDATIONS:    oxygen supplementation to keep saturation greater than 92% - currently on a 1lpm nasal canula -> trial on room air  observe off nebs  transition solumedrol -> prednisone 1mg/kg -> taper by 10mg weekly  continue cefepime  doxycycline discontinued - the urine Legionella antigen is negative and the RVP is negative  robitussin DM/tessalon/hycodan at bedtime  diligent DVT prophylaxis - SQ lovenox    Thank you for the courtesy of this referral. Plan of care discussed with the patient at bedside. Discharge planning. The patient will follow-up with Dr. Kirk Smith in our office in ~ 3 weeks - earlier if her chills and night sweats do not resolve.    Chris Lomas MD, [facility] [place]  [phone]  Pulmonary Medicine   ASSESSMENT:    61 year old gentlewoman, lifelong non-smoker, with history of obesity related obstructive sleep apnea (non-adherent with prescribed CPAP) and mild asthma on albuterol as needed. The patient underwent a right hip replacement revision in May. Due to non-healing, she underwent implantation of a proximal femur hook plate, cables and an allograft femur strut in September. She has been on cefepime for ~ 5 weeks due to pseudomonas found on tissue culture. She was discharged to rehab but returned to the ER yesterday with abdominal discomfort due to a hernia that has been reduced. She has no abdominal pain at present. The patient has been feeling unwell for ~ 1 week. She has fatigue, malaise and weakness. She has been having low grade fevers associated with nighttime fevers and drenching sweats. She has been short of breath with a cough especially when inspiring. She has anterior chest pain exacerbated by cough and breathing. She has no chest congestion or wheeze. In the ER, the patient was found to be hypoxic.     CT scan ->  no evidence of pulmonary embolism - extensive bilateral peripheral infiltrates may be seen in the setting of Covid pneumonia - no acute pathology in the abdomen and pelvis - small fat-containing ventral hernias are appreciated    the patient has hypoxemia in the setting of fatigue, malaise and weakness, low grade fevers associated with nighttime chills and drenching sweats, dyspnea/hypoxemia and a cough with pleuritic chest pain - CT scan has peripheral opacities in both the upper and lower lobes - it would be unusual for the patient to have developed bacterial pneumonia while on cefepime for a pseudomonal joint infection - suspect that the patient has cryptogenic organizing pneumonia rather than eosinophilic pneumonia which tends to be upper lobe predominant    10/10 - much improved except for ongoing nocturnal chills and sweats without fevers; no shortness of breath or hypoxemia on a 1lpm nasal canula; cough is much improved and without sputum production; no chest congestion or wheeze; no fatigue, malaise or weakness: good appetite; minimal anterior chest pain with inspiration;     thrombocytosis     leukocytosis more likely related to high dose steroids than infection    PLAN/RECOMMENDATIONS:    stable oxygenation on room air at rest and with ambulation  observe off nebs  prednisone 60mg daily -> taper by 10mg weekly  continue cefepime  diligent DVT prophylaxis - SQ lovenox  physical therapy    Thank you for the courtesy of this referral. Plan of care discussed with the patient at bedside. Discharge planning. The patient will follow-up with Dr. Kirk Smith in our office after discharge from rehab - earlier if her night sweats do not resolve.    Chris Lomas MD, [facility]  [phone]  Pulmonary Medicine

## 2022-10-11 NOTE — PROGRESS NOTE ADULT - SUBJECTIVE AND OBJECTIVE BOX
Patient is a 61y old  Female who presents with a chief complaint of abdominal wall hernia non-reducible, nausea (10 Oct 2022 19:14)      INTERVAL HPI/OVERNIGHT EVENTS: No event overnight. Her abdominal hernia resolved and reduced. Evaluated by surgery and no surgery indicated at this time but they recommended belt to keep it stable. She is supposed to be discharged and cleared for rehab and continuation of IV Cefepime until 10./27. Waiting for auth number.     Pain Location & Control: OK    MEDICATIONS  (STANDING):  cefepime   IVPB 2000 milliGRAM(s) IV Intermittent every 8 hours  chlorhexidine 4% Liquid 1 Application(s) Topical <User Schedule>  enoxaparin Injectable 40 milliGRAM(s) SubCutaneous every 24 hours  ferrous    sulfate 325 milliGRAM(s) Oral daily  losartan 50 milliGRAM(s) Oral daily  predniSONE   Tablet   Oral   predniSONE   Tablet 60 milliGRAM(s) Oral daily  senna 2 Tablet(s) Oral at bedtime  topiramate 50 milliGRAM(s) Oral daily    MEDICATIONS  (PRN):  acetaminophen     Tablet .. 650 milliGRAM(s) Oral every 6 hours PRN Temp greater or equal to 38C (100.4F), Moderate Pain (4 - 6)  oxyCODONE    IR 5 milliGRAM(s) Oral every 4 hours PRN Severe Pain (7 - 10)  polyethylene glycol 3350 17 Gram(s) Oral daily PRN Constipation  sodium chloride 0.9% lock flush 10 milliLiter(s) IV Push every 1 hour PRN Pre/post blood products, medications, blood draw, and to maintain line patency      Allergies    No Known Allergies    Intolerances    opioid-like analgesics (Vomiting; Nausea)      REVIEW OF SYSTEMS:  CONSTITUTIONAL: No fever, weight loss, or fatigue  EYES: No eye pain, visual disturbances, or discharge  ENMT:  No difficulty hearing, tinnitus, vertigo; No sinus or throat pain  NECK: No pain or stiffness  BREASTS: No pain, masses, or nipple discharge  RESPIRATORY: No cough, wheezing, chills or hemoptysis; No shortness of breath  CARDIOVASCULAR: No chest pain, palpitations, dizziness, or leg swelling  GASTROINTESTINAL: No abdominal or epigastric pain. No nausea, vomiting, or hematemesis; No diarrhea or constipation. No melena or hematochezia.  GENITOURINARY: No dysuria, frequency, hematuria, or incontinence  NEUROLOGICAL: No headaches, memory loss, loss of strength, numbness, or tremors  SKIN: No itching, burning, rashes, or lesions   LYMPH NODES: No enlarged glands  ENDOCRINE: No heat or cold intolerance; No hair loss; No polydipsia or polyuria  MUSCULOSKELETAL: No back pain  PSYCHIATRIC: No depression, anxiety, mood swings, or difficulty sleeping  HEME/LYMPH: No easy bruising, or bleeding gums  ALLERGY AND IMMUNOLOGIC: No hives or eczema    Vital Signs Last 24 Hrs  T(C): 37.2 (11 Oct 2022 17:24), Max: 37.3 (10 Oct 2022 21:17)  T(F): 98.9 (11 Oct 2022 17:24), Max: 99.2 (10 Oct 2022 21:17)  HR: 73 (11 Oct 2022 17:24) (64 - 79)  BP: 145/93 (11 Oct 2022 17:24) (129/81 - 151/95)  BP(mean): --  RR: 18 (11 Oct 2022 17:24) (18 - 18)  SpO2: 95% (11 Oct 2022 17:24) (94% - 96%)    Parameters below as of 11 Oct 2022 17:24  Patient On (Oxygen Delivery Method): nasal cannula        PHYSICAL EXAM:  GENERAL: NAD, well-groomed, well-developed  HEAD:  Atraumatic, Normocephalic  EYES: EOMI, PERRLA, conjunctiva and sclera clear  ENMT: No tonsillar erythema, exudates, or enlargement; Moist mucous membranes, Good dentition, No lesions  NECK: Supple, No JVD, Normal thyroid  NERVOUS SYSTEM:  Alert & Oriented X3, Good concentration; Motor Strength 5/5 B/L upper and lower extremities; DTRs 2+ intact and symmetric  CHEST/LUNG: Clear to auscultation bilaterally; No rales, rhonchi, wheezing, or rubs  HEART: Regular rate and rhythm; No murmurs, rubs, or gallops  ABDOMEN: Soft, Nontender, Nondistended; Bowel sounds present  EXTREMITIES:  2+ Peripheral Pulses, No clubbing or cyanosis  LYMPH: No lymphadenopathy noted  SKIN: No rashes or lesions      LABS:      Ca    9.7        10 Oct 2022 05:19          CAPILLARY BLOOD GLUCOSE            Cultures  Culture Results:   No growth to date. (10-07-22 @ 00:10)  Culture Results:   No growth to date. (10-06-22 @ 23:40)  Culture Results:   10,000 - 49,000 CFU/mL Enterococcus faecium Susceptibility to follow. (10-06-22 @ 18:03)      RADIOLOGY & ADDITIONAL TESTS:    Imaging Personally Reviewed:  [ X] YES  [ ] NO    Consultant(s) Notes Reviewed:  [ X] YES  [ ] NO    Care Discussed with Consultants/Other Providers [X ] YES  [ ] NO

## 2022-10-11 NOTE — PROVIDER CONTACT NOTE (OTHER) - BACKGROUND
Pt to ED 10/6 w abdominal hernia that was manually reduced. Pt had low oxygen placed on 3L nc.
pt admitted with pneumonia
patient admitted with choledocholithiasis

## 2022-10-11 NOTE — PROVIDER CONTACT NOTE (OTHER) - SITUATION
No blood return from PICC. Cannot draw Stat TB.
elevated bp 166/97
Pt had episode of emesis x 1, abdominal pain

## 2022-10-11 NOTE — PROGRESS NOTE ADULT - SUBJECTIVE AND OBJECTIVE BOX
[facility] - PROGRESS NOTE    CHIEF COMPLAINT: hypoxemia; asthma; obesity -> obstructive sleep apnea; abnormal chest CT; cryptogenic organizing pneumonia;     INTERVAL HISTORY: abdominal pain with emesis yesterday - reevaluated by surgery and felt to have no acute pathology; much improved except for ongoing nocturnal chills and sweats without fevers; no shortness of breath or hypoxemia on a 1lpm nasal canula; cough is much improved and without sputum production; no chest congestion or wheeze; no fatigue, malaise or weakness: good appetite; minimal anterior chest pain with inspiration;     REVIEW OF SYSTEMS:  Constitutional: As per interval history  HEENT: Within normal limits  CV: As per interval history  Resp: As per interval history  GI: Within normal limits   : Within normal limits  Musculoskeletal: right femoral fracture and repair  Skin: Within normal limits  Neurological: Within normal limits  Psychiatric: Within normal limits  Endocrine: Within normal limits  Hematologic/Lymphatic: Within normal limits  Allergic/Immunologic: Within normal limits      MEDICATIONS:     Pulmonary "    Anti-microbials:  cefepime   IVPB 2000 milliGRAM(s) IV Intermittent every 8 hours    Cardiovascular:  losartan 50 milliGRAM(s) Oral daily    Other:  chlorhexidine 4% Liquid 1 Application(s) Topical <User Schedule>  enoxaparin Injectable 40 milliGRAM(s) SubCutaneous every 24 hours  ferrous    sulfate 325 milliGRAM(s) Oral daily  predniSONE   Tablet   Oral   predniSONE   Tablet 60 milliGRAM(s) Oral daily  senna 2 Tablet(s) Oral at bedtime  topiramate 50 milliGRAM(s) Oral daily    MEDICATIONS  (PRN):  acetaminophen     Tablet .. 650 milliGRAM(s) Oral every 6 hours PRN Temp greater or equal to 38C (100.4F), Moderate Pain (4 - 6)  oxyCODONE    IR 5 milliGRAM(s) Oral every 4 hours PRN Severe Pain (7 - 10)  polyethylene glycol 3350 17 Gram(s) Oral daily PRN Constipation  sodium chloride 0.9% lock flush 10 milliLiter(s) IV Push every 1 hour PRN Pre/post blood products, medications, blood draw, and to maintain line patency        OBJECTIVE:    PHYSICAL EXAM:       ICU Vital Signs Last 24 Hrs  T(C): 36.3 (11 Oct 2022 09:03), Max: 37.3 (10 Oct 2022 21:17)  T(F): 97.3 (11 Oct 2022 09:03), Max: 99.2 (10 Oct 2022 21:17)  HR: 79 (11 Oct 2022 09:03) (64 - 80)  BP: 129/81 (11 Oct 2022 09:03) (129/81 - 151/95)  BP(mean): --  ABP: --  ABP(mean): --  RR: 18 (11 Oct 2022 09:03) (18 - 18)  SpO2: 94% (11 Oct 2022 09:03) (94% - 96%) on 1lpm nasal canula    General: Awake. Alert. Cooperative. No distress. Appears stated age.	  HEENT: Atraumatic. Normocephalic. Anicteric. Normal oral mucosa. PERRL. EOMI.  Neck: Supple. Trachea midline. Thyroid without enlargement/tenderness/nodules. No carotid bruit. No JVD.	  Cardiovascular: Regular rate and rhythm. S1 S2 normal. No murmurs, rubs or gallops.  Respiratory: Respirations unlabored. Clear to auscultation and percussion bilaterally. No curvature.  Abdomen: Soft. Non-tender. Non-distended. No organomegaly. No masses. Normal bowel sounds.  Extremities: Warm to touch. No clubbing or cyanosis. No pedal edema.  Pulses: 2+ peripheral pulses all extremities.	  Skin: Normal skin color. No rashes or lesions. No ecchymoses. No cyanosis. Warm to touch.  Lymph Nodes: Cervical, supraclavicular and axillary nodes normal  Neurological: Motor and sensory examination equal and normal. A and O x 3  Psychiatry: Appropriate mood and affect.    LABS:                          9.5    19.01 )-----------( 771      ( 10 Oct 2022 05:19 )             30.2     CBC    WBC  19.01 <==, 26.21 <==, 14.82 <==, 14.58 <==    Hemoglobin  9.5 <<==, 9.3 <<==, 9.7 <<==, 9.8 <<==    Hematocrit  30.2 <==, 29.1 <==, 31.1 <==, 31.4 <==    Platelets  771 <==, 698 <==, 681 <==, 663 <==      138  |  105  |  27<H>  ----------------------------<  141<H>    10-10  4.6   |  21<L>  |  0.62      LYTES    sodium  138 <==, 137 <==, 136 <==, 135 <==    potassium   4.6 <==, 4.9 <==, 4.6 <==, 4.7 <==    chloride  105 <==, 105 <==, 104 <==, 102 <==    carbon dioxide  21 <==, 21 <==, 21 <==, 21 <==    =============================================================================================  RENAL FUNCTION:    Creatinine:   0.62  <<==, 0.52  <<==, 0.45  <<==, 0.59  <<==    BUN:   27 <==, 25 <==, 15 <==, 11 <==    ============================================================================================    calcium   9.7 <==, 9.7 <==, 10.0 <==, 9.7 <==    phos   3.8 <==    mag   2.3 <==    ============================================================================================  LFTs    AST:   7 <== , 14 <==     ALT:  15  <== , 13  <==     AP:  81  <=, 84  <=    Bili:  0.1  <=, 0.2  <=    Serum Pro-Brain Natriuretic Peptide: 48 pg/mL (10-06 @ 17:18)    CARDIAC MARKERS ( 06 Oct 2022 17:18 )  CPK x     /CKMB x     /CKMB Units x        troponin 25 ng/L    MICROBIOLOGY:     Respiratory Viral Panel with COVID-19 by TARA (10.06.22 @ 17:24)   Rapid RVP Result: [facility]   SARS-CoV-2: [facility]  This Respiratory Panel uses polymerase chain reaction (PCR) to detect for adenovirus; coronavirus (HKU1, NL63, 229E, OC43); human metapneumovirus (hMPV); human enterovirus/rhinovirus (Entero/RV); influenza A; influenza A/H1; influenza A/H3; influenza A/H1-2009; influenza B; parainfluenza viruses 1, 2, 3, 4; respiratory syncytial virus; Mycoplasma pneumoniae; Chlamydophila pneumoniae; and SARS-CoV-2.   Legionella pneumophila Antigen, Urine (10.07.22 @ 19:52)   Legionella Antigen, Urine: Negative:    Culture - Blood (10.07.22 @ 00:10)   Specimen Source: .Blood Blood-Peripheral   Culture Results:   No growth to date.     Culture - Blood (10.06.22 @ 23:40)   Specimen Source: .Blood Blood-Peripheral   Culture Results:   No growth to date.     Urinalysis Basic - ( 06 Oct 2022 18:03 )    Color: Light Yellow / Appearance: Clear / S.010 / pH: x  Gluc: x / Ketone: Negative  / Bili: Negative / Urobili: Negative   Blood: x / Protein: Trace / Nitrite: Negative   Leuk Esterase: Negative / RBC: 1 /hpf / WBC 3 /HPF   Sq Epi: x / Non Sq Epi: 6 /hpf / Bacteria: Negative    Culture - Urine (10.06.22 @ 18:03)   Specimen Source: Clean Catch Clean Catch (Midstream)   Culture Results:   10,000 - 49,000 CFU/mL Gram Positive Cocci in Pairs and Chains     Culture - Tissue with Gram Stain (22 @ 08:59)   - Piperacillin/Tazobactam: S <=8   - Tobramycin: S <=2   Gram Stain:   No polymorphonuclear leukocytes per low power field   No organisms seen per oil power field   - Amikacin: S <=16   - Aztreonam: S <=4   - Cefepime: S <=2   - Ceftazidime: S <=1   - Ciprofloxacin: S <=0.25   - Gentamicin: S <=2   - Imipenem: S <=1   - Levofloxacin: S <=0.5   - Meropenem: S <=1   Specimen Source: .Tissue Right hip Bone Culture   Culture Results:   Rare Pseudomonas aeruginosa   Organism Identification: Pseudomonas aeruginosa   Organism: Pseudomonas aeruginosa   Method Type: PB     RADIOLOGY:  [x ] Chest radiographs reviewed and interpreted by me    EXAM:  XR CHEST PA LAT 2V                          PROCEDURE DATE:  10/06/2022      FINDINGS:  Scattered bilateral opacities may represent multifocal pneumonia in the appropriate clinical setting. Small left pleural effusion with associated basilar atelectasis.  The heart is normal in size.  There is no pneumothorax.    IMPRESSION:  Scattered bilateral opacities may represent multifocal pneumonia in the appropriate clinical setting. Small left pleural effusion with associated   basilar atelectasis.    ELLYN JOSHI MD; Resident Radiologist  This document has been electronically signed.  SOFI ZHENG MD; Attending Radiologist  This document has been electronically signed. Oct  7 2022  8:30AM  ---------------------------------------------------------------------------------------------------------------  EXAM:  CT ABDOMEN AND PELVIS IC                        EXAM:  [facility]                          PROCEDURE DATE:  10/06/2022      FINDINGS:  CHEST:  LUNGS AND LARGE AIRWAYS: Patent central airways. Multiple bilateral peripheral infiltrates suggestive of Covid pneumonia. Please correlate with Covid status.  PLEURA: No pleural effusion.  VESSELS: Left-sided PICC line is seen with the tip in the superior vena cava  HEART: Heart size is normal. No pericardial effusion.  MEDIASTINUM AND DANA: No lymphadenopathy.  CHEST WALL AND LOWER NECK: Within normal limits.    ABDOMEN AND PELVIS:  LIVER: Within normal limits.  BILE DUCTS: Normal caliber.  GALLBLADDER: Within normal limits.  SPLEEN: Within normal limits.  PANCREAS: Within normal limits.  ADRENALS: Within normal limits.  KIDNEYS/URETERS: Within normal limits.    BLADDER: Within normal limits.  REPRODUCTIVE ORGANS: Uterus and adnexa within normal limits.    BOWEL: No bowel obstruction. Appendix is not visualized. No evidence of   inflammation in the pericecal region.  PERITONEUM: No ascites.  VESSELS: Atherosclerotic changes.  RETROPERITONEUM/LYMPH NODES: No lymphadenopathy.  ABDOMINAL WALL: Supraumbilical fat-containing hernia to left of midline.   Small fat-containing umbilical hernia also seen.  BONES: Degenerative changes.    IMPRESSION: No evidence of pulmonary embolism.  Extensive bilateral peripheral infiltrates may be seen in the setting of Covid pneumonia. Please correlate with Covid testing.  No acute pathology in the abdomen and pelvis small fat-containing ventral hernias are appreciated    HEAVEN PADGETT MD; Attending Radiologist  This document has been electronically signed. Oct  6 2022  6:52PM  ---------------------------------------------------------------------------------------------------------------         [facility] - PROGRESS NOTE    CHIEF COMPLAINT: hypoxemia; asthma; obesity -> obstructive sleep apnea; abnormal chest CT; cryptogenic organizing pneumonia;     INTERVAL HISTORY: abdominal pain with emesis yesterday - reevaluated by surgery and felt not to have acute pathology; much improved; sweats last night were much shorter in duration; no fevers or chills; no shortness of breath or hypoxemia on room air ambulating with a walker about the gonzalez; cough is much improved and without sputum production; no chest congestion or wheeze; no fatigue, malaise or weakness: good appetite; resolved anterior chest pain with inspiration; discharge on hold awaiting insurance approval for return to rehab following hip surgery    REVIEW OF SYSTEMS:  Constitutional: As per interval history  HEENT: Within normal limits  CV: As per interval history  Resp: As per interval history  GI: Within normal limits   : Within normal limits  Musculoskeletal: right femoral fracture and repair  Skin: Within normal limits  Neurological: Within normal limits  Psychiatric: Within normal limits  Endocrine: Within normal limits  Hematologic/Lymphatic: Within normal limits  Allergic/Immunologic: Within normal limits      MEDICATIONS:     Pulmonary "    Anti-microbials:  cefepime   IVPB 2000 milliGRAM(s) IV Intermittent every 8 hours    Cardiovascular:  losartan 50 milliGRAM(s) Oral daily    Other:  chlorhexidine 4% Liquid 1 Application(s) Topical <User Schedule>  enoxaparin Injectable 40 milliGRAM(s) SubCutaneous every 24 hours  ferrous    sulfate 325 milliGRAM(s) Oral daily  predniSONE   Tablet   Oral   predniSONE   Tablet 60 milliGRAM(s) Oral daily  senna 2 Tablet(s) Oral at bedtime  topiramate 50 milliGRAM(s) Oral daily    MEDICATIONS  (PRN):  acetaminophen     Tablet .. 650 milliGRAM(s) Oral every 6 hours PRN Temp greater or equal to 38C (100.4F), Moderate Pain (4 - 6)  oxyCODONE    IR 5 milliGRAM(s) Oral every 4 hours PRN Severe Pain (7 - 10)  polyethylene glycol 3350 17 Gram(s) Oral daily PRN Constipation  sodium chloride 0.9% lock flush 10 milliLiter(s) IV Push every 1 hour PRN Pre/post blood products, medications, blood draw, and to maintain line patency        OBJECTIVE:    PHYSICAL EXAM:       ICU Vital Signs Last 24 Hrs  T(C): 36.3 (11 Oct 2022 09:03), Max: 37.3 (10 Oct 2022 21:17)  T(F): 97.3 (11 Oct 2022 09:03), Max: 99.2 (10 Oct 2022 21:17)  HR: 79 (11 Oct 2022 09:03) (64 - 80)  BP: 129/81 (11 Oct 2022 09:03) (129/81 - 151/95)  BP(mean): --  ABP: --  ABP(mean): --  RR: 18 (11 Oct 2022 09:03) (18 - 18)  SpO2: 94% (11 Oct 2022 09:03) (94% - 96%) on room air    General: Awake. Alert. Cooperative. No distress. Appears stated age. Sitting in the chair 	  HEENT: Atraumatic. Normocephalic. Anicteric. Normal oral mucosa. PERRL. EOMI. Mallampati class IV airway  Neck: Supple. Trachea midline. Thyroid without enlargement/tenderness/nodules. No carotid bruit. No JVD. Short and wide.  Cardiovascular: Regular rate and rhythm. S1 S2 normal. No murmurs, rubs or gallops.  Respiratory: Respirations unlabored. Decreased rales. No curvature.  Abdomen: Soft. Non-tender. Non-distended. No organomegaly. No masses. Normal bowel sounds.  Extremities: Warm to touch. No clubbing or cyanosis. No pedal edema.  Pulses: 2+ peripheral pulses all extremities.	  Skin: Normal skin color. No rashes or lesions. No ecchymoses. No cyanosis. Warm to touch.  Lymph Nodes: Cervical, supraclavicular and axillary nodes normal  Neurological: Motor and sensory examination equal and normal. A and O x 3  Psychiatry: Appropriate mood and affect.    LABS:                          9.5    19.01 )-----------( 771      ( 10 Oct 2022 05:19 )             30.2     CBC    WBC  19.01 <==, 26.21 <==, 14.82 <==, 14.58 <==    Hemoglobin  9.5 <<==, 9.3 <<==, 9.7 <<==, 9.8 <<==    Hematocrit  30.2 <==, 29.1 <==, 31.1 <==, 31.4 <==    Platelets  771 <==, 698 <==, 681 <==, 663 <==      138  |  105  |  27<H>  ----------------------------<  141<H>    10-10  4.6   |  21<L>  |  0.62      LYTES    sodium  138 <==, 137 <==, 136 <==, 135 <==    potassium   4.6 <==, 4.9 <==, 4.6 <==, 4.7 <==    chloride  105 <==, 105 <==, 104 <==, 102 <==    carbon dioxide  21 <==, 21 <==, 21 <==, 21 <==    =============================================================================================  RENAL FUNCTION:    Creatinine:   0.62  <<==, 0.52  <<==, 0.45  <<==, 0.59  <<==    BUN:   27 <==, 25 <==, 15 <==, 11 <==    ============================================================================================    calcium   9.7 <==, 9.7 <==, 10.0 <==, 9.7 <==    phos   3.8 <==    mag   2.3 <==    ============================================================================================  LFTs    AST:   7 <== , 14 <==     ALT:  15  <== , 13  <==     AP:  81  <=, 84  <=    Bili:  0.1  <=, 0.2  <=    Serum Pro-Brain Natriuretic Peptide: 48 pg/mL (10-06 @ 17:18)    CARDIAC MARKERS ( 06 Oct 2022 17:18 )  CPK x     /CKMB x     /CKMB Units x        troponin 25 ng/L    MICROBIOLOGY:     Respiratory Viral Panel with COVID-19 by TARA (10.06.22 @ 17:24)   Rapid RVP Result: [facility]   SARS-CoV-2: [facility]  This Respiratory Panel uses polymerase chain reaction (PCR) to detect for adenovirus; coronavirus (HKU1, NL63, 229E, OC43); human metapneumovirus (hMPV); human enterovirus/rhinovirus (Entero/RV); influenza A; influenza A/H1; influenza A/H3; influenza A/H1-2009; influenza B; parainfluenza viruses 1, 2, 3, 4; respiratory syncytial virus; Mycoplasma pneumoniae; Chlamydophila pneumoniae; and SARS-CoV-2.     Legionella pneumophila Antigen, Urine (10.07.22 @ 19:52)   Legionella Antigen, Urine: Negative:    Culture - Blood (10.07.22 @ 00:10)   Specimen Source: .Blood Blood-Peripheral   Culture Results:   No growth to date.     Culture - Blood (10.06.22 @ 23:40)   Specimen Source: .Blood Blood-Peripheral   Culture Results:   No growth to date.     Urinalysis Basic - ( 06 Oct 2022 18:03 )    Color: Light Yellow / Appearance: Clear / S.010 / pH: x  Gluc: x / Ketone: Negative  / Bili: Negative / Urobili: Negative   Blood: x / Protein: Trace / Nitrite: Negative   Leuk Esterase: Negative / RBC: 1 /hpf / WBC 3 /HPF   Sq Epi: x / Non Sq Epi: 6 /hpf / Bacteria: Negative    Culture - Urine (10.06.22 @ 18:03)   Specimen Source: Clean Catch Clean Catch (Midstream)   Culture Results:   10,000 - 49,000 CFU/mL Gram Positive Cocci in Pairs and Chains     Culture - Tissue with Gram Stain (22 @ 08:59)   - Piperacillin/Tazobactam: S <=8   - Tobramycin: S <=2   Gram Stain:   No polymorphonuclear leukocytes per low power field   No organisms seen per oil power field   - Amikacin: S <=16   - Aztreonam: S <=4   - Cefepime: S <=2   - Ceftazidime: S <=1   - Ciprofloxacin: S <=0.25   - Gentamicin: S <=2   - Imipenem: S <=1   - Levofloxacin: S <=0.5   - Meropenem: S <=1     Specimen Source: .Tissue Right hip Bone Culture   Culture Results:   Rare Pseudomonas aeruginosa   Organism Identification: Pseudomonas aeruginosa   Organism: Pseudomonas aeruginosa   Method Type: [facility]     RADIOLOGY:  [x ] Chest radiographs reviewed and interpreted by me    EXAM:  XR CHEST PA LAT 2V                          PROCEDURE DATE:  10/06/2022      FINDINGS:  Scattered bilateral opacities may represent multifocal pneumonia in the appropriate clinical setting. Small left pleural effusion with associated basilar atelectasis.  The heart is normal in size.  There is no pneumothorax.    IMPRESSION:  Scattered bilateral opacities may represent multifocal pneumonia in the appropriate clinical setting. Small left pleural effusion with associated   basilar atelectasis.    ELLYN JOSHI MD; Resident Radiologist  This document has been electronically signed.  SOFI ZHENG MD; Attending Radiologist  This document has been electronically signed. Oct  7 2022  8:30AM  ---------------------------------------------------------------------------------------------------------------  EXAM:  CT ABDOMEN AND PELVIS IC                        EXAM:  CT ANGIO CHEST PULM [facility]                          PROCEDURE DATE:  10/06/2022      FINDINGS:  CHEST:  LUNGS AND LARGE AIRWAYS: Patent central airways. Multiple bilateral peripheral infiltrates suggestive of Covid pneumonia. Please correlate with Covid status.  PLEURA: No pleural effusion.  VESSELS: Left-sided PICC line is seen with the tip in the superior vena cava  HEART: Heart size is normal. No pericardial effusion.  MEDIASTINUM AND DANA: No lymphadenopathy.  CHEST WALL AND LOWER NECK: Within normal limits.    ABDOMEN AND PELVIS:  LIVER: Within normal limits.  BILE DUCTS: Normal caliber.  GALLBLADDER: Within normal limits.  SPLEEN: Within normal limits.  PANCREAS: Within normal limits.  ADRENALS: Within normal limits.  KIDNEYS/URETERS: Within normal limits.    BLADDER: Within normal limits.  REPRODUCTIVE ORGANS: Uterus and adnexa within normal limits.    BOWEL: No bowel obstruction. Appendix is not visualized. No evidence of   inflammation in the pericecal region.  PERITONEUM: No ascites.  VESSELS: Atherosclerotic changes.  RETROPERITONEUM/LYMPH NODES: No lymphadenopathy.  ABDOMINAL WALL: Supraumbilical fat-containing hernia to left of midline.   Small fat-containing umbilical hernia also seen.  BONES: Degenerative changes.    IMPRESSION: No evidence of pulmonary embolism.  Extensive bilateral peripheral infiltrates may be seen in the setting of Covid pneumonia. Please correlate with Covid testing.  No acute pathology in the abdomen and pelvis small fat-containing ventral hernias are appreciated    HEAVEN PADGETT MD; Attending Radiologist  This document has been electronically signed. Oct  6 2022  6:52PM  ---------------------------------------------------------------------------------------------------------------

## 2022-10-11 NOTE — PROVIDER CONTACT NOTE (OTHER) - ASSESSMENT
Pt tachy to 110 (while vomiting), /88. Pt complaining of abdominal pain. Stating she "feels how she did when she came to hospital"
Left PICC flushing adequately, but no blood return. Tried re-positioning and multiple RNs attempt with no blood return.
elevated bp. patient does not have any complaints- except mild pain in abdomen. denies headache. Losartan given one hour prior

## 2022-10-11 NOTE — PROGRESS NOTE ADULT - ASSESSMENT
60 y/o female medical history sig for Asthma, obesity, JOSE LUIS not on NIV, Migraines, HTN who was accepted for transfer from [facility] by Dr. Byrnes for evaluation of previous right THR on 5/2/22. Patient was admitted to [facility] on 8/27 s/p fall and found to have fractured femoral prosthesis/ non union and may need bone graft. She denies any LOC or head trauma from fall, describes fall as leg giving out. She was seen by Cardiology prior to transfer and deemed stable for surgery. Denies any tori-operative issues in 5/22 during last revision.    cryptogenic organizing PNA- Continue Robitussin, Tessalon, Hycodan at bedtime. Continue SQ Lovenox. Continue Prednisone.   acute hypoxic respiratory failure due to  PNA-improved on 1L nasal cannula only. Continue wean her off on oxygen.   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 WBAT. PT/ OT. Ortho following . OR Cultures PRELIM rare Pseudomonas. ID consulted and Cefepime started antibiotics through 10/28/22 for total 8 weeks. Will need suppression thereafter.  HTN-  Losartan and hold HCTZ due to soft BP.  Migraine - Cont. Topamax  JOSE LUIS- Not on NIV HS. O/P pulm f/u  Asthma- PRN nebs  DVT Prophylaxis -- Lovenox SQ   Abdominal hernia - reduced, asymptomatic. No surgery indicated at this time. F/u with surgery as outpatient  after completion of abx and IV steroid.

## 2022-10-12 ENCOUNTER — TRANSCRIPTION ENCOUNTER (OUTPATIENT)
Age: 62
End: 2022-10-12

## 2022-10-12 DIAGNOSIS — G43.909 MIGRAINE, UNSPECIFIED, NOT INTRACTABLE, WITHOUT STATUS MIGRAINOSUS: ICD-10-CM

## 2022-10-12 DIAGNOSIS — K43.9 VENTRAL HERNIA WITHOUT OBSTRUCTION OR GANGRENE: ICD-10-CM

## 2022-10-12 DIAGNOSIS — I10 ESSENTIAL (PRIMARY) HYPERTENSION: ICD-10-CM

## 2022-10-12 LAB
CULTURE RESULTS: SIGNIFICANT CHANGE UP
CULTURE RESULTS: SIGNIFICANT CHANGE UP
GAMMA INTERFERON BACKGROUND BLD IA-ACNC: 0.02 IU/ML — SIGNIFICANT CHANGE UP
M TB IFN-G BLD-IMP: ABNORMAL
M TB IFN-G CD4+ BCKGRND COR BLD-ACNC: 0 IU/ML — SIGNIFICANT CHANGE UP
M TB IFN-G CD4+CD8+ BCKGRND COR BLD-ACNC: -0.01 IU/ML — SIGNIFICANT CHANGE UP
QUANT TB PLUS MITOGEN MINUS NIL: 0 IU/ML — SIGNIFICANT CHANGE UP
SPECIMEN SOURCE: SIGNIFICANT CHANGE UP
SPECIMEN SOURCE: SIGNIFICANT CHANGE UP

## 2022-10-12 RX ADMIN — ENOXAPARIN SODIUM 40 MILLIGRAM(S): 100 INJECTION SUBCUTANEOUS at 05:39

## 2022-10-12 RX ADMIN — Medication 650 MILLIGRAM(S): at 14:41

## 2022-10-12 RX ADMIN — Medication 650 MILLIGRAM(S): at 23:14

## 2022-10-12 RX ADMIN — Medication 650 MILLIGRAM(S): at 22:44

## 2022-10-12 RX ADMIN — CEFEPIME 100 MILLIGRAM(S): 1 INJECTION, POWDER, FOR SOLUTION INTRAMUSCULAR; INTRAVENOUS at 01:02

## 2022-10-12 RX ADMIN — CHLORHEXIDINE GLUCONATE 1 APPLICATION(S): 213 SOLUTION TOPICAL at 10:50

## 2022-10-12 RX ADMIN — Medication 325 MILLIGRAM(S): at 14:39

## 2022-10-12 RX ADMIN — CEFEPIME 100 MILLIGRAM(S): 1 INJECTION, POWDER, FOR SOLUTION INTRAMUSCULAR; INTRAVENOUS at 17:24

## 2022-10-12 RX ADMIN — Medication 60 MILLIGRAM(S): at 05:39

## 2022-10-12 RX ADMIN — LOSARTAN POTASSIUM 50 MILLIGRAM(S): 100 TABLET, FILM COATED ORAL at 05:39

## 2022-10-12 RX ADMIN — Medication 50 MILLIGRAM(S): at 14:39

## 2022-10-12 RX ADMIN — CEFEPIME 100 MILLIGRAM(S): 1 INJECTION, POWDER, FOR SOLUTION INTRAMUSCULAR; INTRAVENOUS at 10:49

## 2022-10-12 NOTE — DISCHARGE NOTE PROVIDER - PROVIDER TOKENS
PROVIDER:[TOKEN:[[id number]]],PROVIDER:[TOKEN:[[id number]]],PROVIDER:[TOKEN:[[id number]]] PROVIDER:[TOKEN:[[id number]]],PROVIDER:[TOKEN:[[id number]]],PROVIDER:[TOKEN:[[id number]]],PROVIDER:[TOKEN:[[id number]]]

## 2022-10-12 NOTE — DISCHARGE NOTE PROVIDER - CARE PROVIDER_API CALL
Kirk Smith)  Critical Care Medicine; Internal Medicine; Pulmonary Disease  [address], [address]  [place] [address]  Phone: [phone]  Fax: [phone]  Follow Up Time:     Dejuan Lau  ANATOMIC/CLINICAL PATHOLOGY  [address] [place] [address], [address]  [place] [address]  Phone: [phone]  Fax: [phone]  Follow Up Time:     Jose Chang)  Orthopaedic Surgery  [address] [place] [address]  Phone: [phone]  Fax: [phone]  Follow Up Time:    Kirk Smith)  Critical Care Medicine; Internal Medicine; Pulmonary Disease  [facility] [address] [facility], [address]  [place] [address]  Phone: [phone]  Fax: [phone]  Follow Up Time:     Dejuan Lau  ANATOMIC/CLINICAL PATHOLOGY  [address] [place] [address], [address]  [place] [address]  Phone: [phone]  Fax: [phone]  Follow Up Time:     Jose Chang)  Orthopaedic Surgery  [address] [place] [address]  Phone: [phone]  Fax: [phone]  Follow Up Time:     Graham Meier)  Surgery  [address] [facility], [address]  [place] [address]  Phone: [phone]  Fax: [phone]  Follow Up Time:

## 2022-10-12 NOTE — PROGRESS NOTE ADULT - ASSESSMENT
ASSESSMENT:    61 year old gentlewoman, lifelong non-smoker, with history of obesity related obstructive sleep apnea (non-adherent with prescribed CPAP) and mild asthma on albuterol as needed. The patient underwent a right hip replacement revision in May. Due to non-healing, she underwent implantation of a proximal femur hook plate, cables and an allograft femur strut in September. She has been on cefepime for ~ 5 weeks due to pseudomonas found on tissue culture. She was discharged to rehab but returned to the ER yesterday with abdominal discomfort due to a hernia that has been reduced. She has no abdominal pain at present. The patient has been feeling unwell for ~ 1 week. She has fatigue, malaise and weakness. She has been having low grade fevers associated with nighttime fevers and drenching sweats. She has been short of breath with a cough especially when inspiring. She has anterior chest pain exacerbated by cough and breathing. She has no chest congestion or wheeze. In the ER, the patient was found to be hypoxic.     CT scan ->  no evidence of pulmonary embolism - extensive bilateral peripheral infiltrates may be seen in the setting of Covid pneumonia - no acute pathology in the abdomen and pelvis - small fat-containing ventral hernias are appreciated    the patient has hypoxemia in the setting of fatigue, malaise and weakness, low grade fevers associated with nighttime chills and drenching sweats, dyspnea/hypoxemia and a cough with pleuritic chest pain - CT scan has peripheral opacities in both the upper and lower lobes - it would be unusual for the patient to have developed bacterial pneumonia while on cefepime for a pseudomonal joint infection - suspect that the patient has cryptogenic organizing pneumonia rather than eosinophilic pneumonia which tends to be upper lobe predominant    10/10 - much improved except for ongoing nocturnal chills and sweats without fevers; no shortness of breath or hypoxemia on a 1lpm nasal canula; cough is much improved and without sputum production; no chest congestion or wheeze; no fatigue, malaise or weakness: good appetite; minimal anterior chest pain with inspiration;     thrombocytosis     leukocytosis more likely related to high dose steroids than infection    PLAN/RECOMMENDATIONS:    stable oxygenation on room air at rest and with ambulation  observe off nebs  prednisone 60mg daily -> taper by 10mg weekly  continues on a long course of cefepime for pseudomonas in her right hip prothesis  diligent DVT prophylaxis - SQ lovenox  physical therapy    Thank you for the courtesy of this referral. Plan of care discussed with the patient at bedside. Discharge planning. The patient will follow-up with Dr. Kirk Smith in our office after discharge from rehab - earlier if her night sweats do not resolve. She has my cell phone number to call with acute issues.    Chris Lomas MD, [facility]  [phone]  Pulmonary Medicine

## 2022-10-12 NOTE — PROGRESS NOTE ADULT - SUBJECTIVE AND OBJECTIVE BOX
[facility] - PROGRESS NOTE    CHIEF COMPLAINT: hypoxemia; asthma; obesity -> obstructive sleep apnea; abnormal chest CT; cryptogenic organizing pneumonia;     INTERVAL HISTORY: improved except for ongoing night sweats; no fevers or chills; no shortness of breath or hypoxemia on room air ambulating with a walker about the gonzalez; cough is much improved and without sputum production; no chest congestion or wheeze; no fatigue, malaise or weakness: good appetite; resolved anterior chest pain with inspiration; discharge on hold awaiting insurance approval for return to rehab following hip surgery    REVIEW OF SYSTEMS:  Constitutional: As per interval history  HEENT: Within normal limits  CV: As per interval history  Resp: As per interval history  GI: Within normal limits   : Within normal limits  Musculoskeletal: right femoral fracture and repair  Skin: Within normal limits  Neurological: Within normal limits  Psychiatric: Within normal limits  Endocrine: Within normal limits  Hematologic/Lymphatic: Within normal limits  Allergic/Immunologic: Within normal limits    MEDICATIONS:     Pulmonary "    Anti-microbials:  cefepime   IVPB 2000 milliGRAM(s) IV Intermittent every 8 hours    Cardiovascular:  losartan 50 milliGRAM(s) Oral daily    Other:  chlorhexidine 4% Liquid 1 Application(s) Topical <User Schedule>  enoxaparin Injectable 40 milliGRAM(s) SubCutaneous every 24 hours  ferrous    sulfate 325 milliGRAM(s) Oral daily  predniSONE   Tablet   Oral   predniSONE   Tablet 60 milliGRAM(s) Oral daily  senna 2 Tablet(s) Oral at bedtime  topiramate 50 milliGRAM(s) Oral daily    MEDICATIONS  (PRN):  acetaminophen     Tablet .. 650 milliGRAM(s) Oral every 6 hours PRN Temp greater or equal to 38C (100.4F), Moderate Pain (4 - 6)  oxyCODONE    IR 5 milliGRAM(s) Oral every 4 hours PRN Severe Pain (7 - 10)  polyethylene glycol 3350 17 Gram(s) Oral daily PRN Constipation  sodium chloride 0.9% lock flush 10 milliLiter(s) IV Push every 1 hour PRN Pre/post blood products, medications, blood draw, and to maintain line patency    OBJECTIVE:    PHYSICAL EXAM:       ICU Vital Signs Last 24 Hrs  T(C): 37.1 (12 Oct 2022 05:01), Max: 37.2 (11 Oct 2022 17:24)  T(F): 98.8 (12 Oct 2022 05:01), Max: 98.9 (11 Oct 2022 17:24)  HR: 77 (12 Oct 2022 05:01) (60 - 84)  BP: 128/81 (12 Oct 2022 05:01) (125/80 - 145/93)  BP(mean): --  ABP: --  ABP(mean): --  RR: 18 (12 Oct 2022 05:01) (18 - 18)  SpO2: 95% (12 Oct 2022 05:01) (94% - 95%) on room air     General: Awake. Alert. Cooperative. No distress. Appears stated age. In bed.  HEENT: Atraumatic. Normocephalic. Anicteric. Normal oral mucosa. PERRL. EOMI. Mallampati class IV airway  Neck: Supple. Trachea midline. Thyroid without enlargement/tenderness/nodules. No carotid bruit. No JVD. Short and wide.  Cardiovascular: Regular rate and rhythm. S1 S2 normal. No murmurs, rubs or gallops.  Respiratory: Respirations unlabored. Decreased rales. No curvature.  Abdomen: Soft. Non-tender. Non-distended. No organomegaly. No masses. Normal bowel sounds.  Extremities: Warm to touch. No clubbing or cyanosis. No pedal edema. Decreased right hip range of motion.  Pulses: 2+ peripheral pulses all extremities.	  Skin: Normal skin color. No rashes or lesions. No ecchymoses. No cyanosis. Warm to touch.  Lymph Nodes: Cervical, supraclavicular and axillary nodes normal  Neurological: Motor and sensory examination equal and normal. A and O x 3  Psychiatry: Appropriate mood and affect.    LABS:      CBC    WBC  19.01 <==, 26.21 <==, 14.82 <==, 14.58 <==    Hemoglobin  9.5 <<==, 9.3 <<==, 9.7 <<==, 9.8 <<==    Hematocrit  30.2 <==, 29.1 <==, 31.1 <==, 31.4 <==    Platelets  771 <==, 698 <==, 681 <==, 663 <==      138  |  105  |  27<H>  ----------------------------<  141<H>    10-10  4.6   |  21<L>  |  0.62      LYTES    sodium  138 <==, 137 <==, 136 <==, 135 <==    potassium   4.6 <==, 4.9 <==, 4.6 <==, 4.7 <==    chloride  105 <==, 105 <==, 104 <==, 102 <==    carbon dioxide  21 <==, 21 <==, 21 <==, 21 <==    =============================================================================================  RENAL FUNCTION:    Creatinine:   0.62  <<==, 0.52  <<==, 0.45  <<==, 0.59  <<==    BUN:   27 <==, 25 <==, 15 <==, 11 <==    ============================================================================================    calcium   9.7 <==, 9.7 <==, 10.0 <==, 9.7 <==    phos   3.8 <==    mag   2.3 <==    ============================================================================================  LFTs    AST:   7 <== , 14 <==     ALT:  15  <== , 13  <==     AP:  81  <=, 84  <=    Bili:  0.1  <=, 0.2  <=    Serum Pro-Brain Natriuretic Peptide: 48 pg/mL (10-06 @ 17:18)    CARDIAC MARKERS ( 06 Oct 2022 17:18 )  CPK x     /CKMB x     /CKMB Units x        troponin 25 ng/L    MICROBIOLOGY:     Respiratory Viral Panel with COVID-19 by TARA (10.06.22 @ 17:24)   Rapid RVP Result: [facility]   SARS-CoV-2: [facility]  This Respiratory Panel uses polymerase chain reaction (PCR) to detect for adenovirus; coronavirus (HKU1, NL63, 229E, OC43); human metapneumovirus (hMPV); human enterovirus/rhinovirus (Entero/RV); influenza A; influenza A/H1; influenza A/H3; influenza A/H1-2009; influenza B; parainfluenza viruses 1, 2, 3, 4; respiratory syncytial virus; Mycoplasma pneumoniae; Chlamydophila pneumoniae; and SARS-CoV-2.     Legionella pneumophila Antigen, Urine (10.07.22 @ 19:52)   Legionella Antigen, Urine: Negative:    Culture - Blood (10.07.22 @ 00:10)   Specimen Source: .Blood Blood-Peripheral   Culture Results:   No growth to date.     Culture - Blood (10.06.22 @ 23:40)   Specimen Source: .Blood Blood-Peripheral   Culture Results:   No growth to date.     Urinalysis Basic - ( 06 Oct 2022 18:03 )    Color: Light Yellow / Appearance: Clear / S.010 / pH: x  Gluc: x / Ketone: Negative  / Bili: Negative / Urobili: Negative   Blood: x / Protein: Trace / Nitrite: Negative   Leuk Esterase: Negative / RBC: 1 /hpf / WBC 3 /HPF   Sq Epi: x / Non Sq Epi: 6 /hpf / Bacteria: Negative    Culture - Urine (10.06.22 @ 18:03)   Specimen Source: Clean Catch Clean Catch (Midstream)   Culture Results:   10,000 - 49,000 CFU/mL Gram Positive Cocci in Pairs and Chains     Culture - Tissue with Gram Stain (22 @ 08:59)   - Piperacillin/Tazobactam: S <=8   - Tobramycin: S <=2   Gram Stain:   No polymorphonuclear leukocytes per low power field   No organisms seen per oil power field   - Amikacin: S <=16   - Aztreonam: S <=4   - Cefepime: S <=2   - Ceftazidime: S <=1   - Ciprofloxacin: S <=0.25   - Gentamicin: S <=2   - Imipenem: S <=1   - Levofloxacin: S <=0.5   - Meropenem: S <=1     Specimen Source: .Tissue Right hip Bone Culture   Culture Results:   Rare Pseudomonas aeruginosa   Organism Identification: Pseudomonas aeruginosa   Organism: Pseudomonas aeruginosa   Method Type: PB     RADIOLOGY:  [x ] Chest radiographs reviewed and interpreted by me    EXAM:  XR CHEST PA LAT 2V                          PROCEDURE DATE:  10/06/2022      FINDINGS:  Scattered bilateral opacities may represent multifocal pneumonia in the appropriate clinical setting. Small left pleural effusion with associated basilar atelectasis.  The heart is normal in size.  There is no pneumothorax.    IMPRESSION:  Scattered bilateral opacities may represent multifocal pneumonia in the appropriate clinical setting. Small left pleural effusion with associated   basilar atelectasis.    ELLYN JOSHI MD; Resident Radiologist  This document has been electronically signed.  SOFI ZHENG MD; Attending Radiologist  This document has been electronically signed. Oct  7 2022  8:30AM  ---------------------------------------------------------------------------------------------------------------  EXAM:  CT ABDOMEN AND PELVIS IC                        [facility]                          PROCEDURE DATE:  10/06/2022      FINDINGS:  CHEST:  LUNGS AND LARGE AIRWAYS: Patent central airways. Multiple bilateral peripheral infiltrates suggestive of Covid pneumonia. Please correlate with Covid status.  PLEURA: No pleural effusion.  VESSELS: Left-sided PICC line is seen with the tip in the superior vena cava  HEART: Heart size is normal. No pericardial effusion.  MEDIASTINUM AND DANA: No lymphadenopathy.  CHEST WALL AND LOWER NECK: Within normal limits.    ABDOMEN AND PELVIS:  LIVER: Within normal limits.  BILE DUCTS: Normal caliber.  GALLBLADDER: Within normal limits.  SPLEEN: Within normal limits.  PANCREAS: Within normal limits.  ADRENALS: Within normal limits.  KIDNEYS/URETERS: Within normal limits.    BLADDER: Within normal limits.  REPRODUCTIVE ORGANS: Uterus and adnexa within normal limits.    BOWEL: No bowel obstruction. Appendix is not visualized. No evidence of   inflammation in the pericecal region.  PERITONEUM: No ascites.  VESSELS: Atherosclerotic changes.  RETROPERITONEUM/LYMPH NODES: No lymphadenopathy.  ABDOMINAL WALL: Supraumbilical fat-containing hernia to left of midline.   Small fat-containing umbilical hernia also seen.  BONES: Degenerative changes.    IMPRESSION: No evidence of pulmonary embolism.  Extensive bilateral peripheral infiltrates may be seen in the setting of Covid pneumonia. Please correlate with Covid testing.  No acute pathology in the abdomen and pelvis small fat-containing ventral hernias are appreciated    HEAVEN PADGETT MD; Attending Radiologist  This document has been electronically signed. Oct  6 2022  6:52PM  ---------------------------------------------------------------------------------------------------------------

## 2022-10-12 NOTE — DISCHARGE NOTE PROVIDER - NSDCMRMEDTOKEN_GEN_ALL_CORE_FT
acetaminophen 325 mg oral tablet: 3 tab(s) orally every 8 hours, As Needed  mild pain  cefepime 2 g intravenous injection: 2 gram(s) intravenous every 8 hours  enoxaparin: 40 milligram(s) subcutaneous once a day  ferrous sulfate 325 mg (65 mg elemental iron) oral tablet: 1 tab(s) orally once a day  losartan 50 mg oral tablet: 1 tab(s) orally once a day  Multiple Vitamins oral tablet: 1 tab(s) orally once a day  oxyCODONE 5 mg oral tablet: 1 tab(s) orally every 3 hours, As needed, Moderate pain  polyethylene glycol 3350 oral powder for reconstitution: 17 gram(s) orally once a day  senna leaf extract oral tablet: 2 tab(s) orally once a day (at bedtime)  topiramate 50 mg oral tablet: 1 tab(s) orally once a day   acetaminophen 325 mg oral tablet: 3 tab(s) orally every 8 hours, As Needed  mild pain  cefepime 2 g intravenous injection: 2 gram(s) intravenous every 8 hours- continue thru 10/28/22  enoxaparin: 40 milligram(s) subcutaneous once a day  ferrous sulfate 325 mg (65 mg elemental iron) oral tablet: 1 tab(s) orally once a day  losartan 50 mg oral tablet: 1 tab(s) orally once a day  Multiple Vitamins oral tablet: 1 tab(s) orally once a day  polyethylene glycol 3350 oral powder for reconstitution: 17 gram(s) orally once a day  predniSONE: 60 milligram(s) orally once a day ** TAPER down by 10mg a week starting 10/18/22  *see d/c instructions  senna leaf extract oral tablet: 2 tab(s) orally once a day (at bedtime)  topiramate 50 mg oral tablet: 1 tab(s) orally once a day

## 2022-10-12 NOTE — DISCHARGE NOTE PROVIDER - NSDCCPCAREPLAN_GEN_ALL_CORE_FT
PRINCIPAL DISCHARGE DIAGNOSIS  Diagnosis: Pneumonia  Assessment and Plan of Treatment: AHRF - acute hypoxic respiratory failure due to  PNA - and improved on 1L NC and cntinued to be weaned off on oxygen.   cryptogenic organizing PNA - c/w Robitussin, Tessalon, Hycodan at bedtime. Prednisone taper in progress    Seen and followed by pulmonary   Will followup as an out-pt with pulmonary      SECONDARY DISCHARGE DIAGNOSES  Diagnosis: History of open reduction and internal fixation (ORIF) procedure  Assessment and Plan of Treatment: s/p 9/1 ORIF peroprosthetuic fx.  Right Hip Osteotomy Nonunion - as above s/p right hip ORIF periprosthetic fx 9/1/2022, pain control with Oxycodone and using Incentive spirometer  WBAT - PT/ OT & Ortho followed. OR Cultures PRELIM rare Pseudomonas.   ID consulted & Cefepime started antibiotics through 10/28/22 for total 8 weeks.   Will need suppression thereafter and will follow up as an out-pt .      Diagnosis: Abdominal wall hernia  Assessment and Plan of Treatment: Abdominal hernia - reduced, asymptomatic.   Seen by surgery and no surgery indicated at this time.   Will f/u with surgery as outpatient after completion of abx and IV steroid.        Diagnosis: HTN (hypertension)  Assessment and Plan of Treatment: HTN-  Losartan and hold HCTZ due to soft BP.      Diagnosis: Migraines  Assessment and Plan of Treatment: Migraine - Cont. Topamax     PRINCIPAL DISCHARGE DIAGNOSIS  Diagnosis: Pneumonia  Assessment and Plan of Treatment: AHRF - acute hypoxic respiratory failure due to  PNA - and improved on 1L NC and cntinued to be weaned off on oxygen.   cryptogenic organizing PNA - c/w Robitussin, Tessalon, Hycodan at bedtime. Prednisone taper in progress    Seen and followed by pulmonary   Will followup as an out-pt with pulmonary      SECONDARY DISCHARGE DIAGNOSES  Diagnosis: History of open reduction and internal fixation (ORIF) procedure  Assessment and Plan of Treatment: You underwent ORIF 9/1/22 peroprosthetuic fx.  Right Hip Osteotomy Nonunion - as above status post right hip ORIF periprosthetic fx 9/1/2022, pain control with Oxycodone and using Incentive spirometer  WBAT - PT/ OT & Ortho followed. OR Cultures PRELIM rare Pseudomonas.   ID consulted & Cefepime started antibiotics through 10/28/22 for total 8 weeks.   Will need suppression thereafter and will follow up as an out-patien    Diagnosis: Abdominal wall hernia  Assessment and Plan of Treatment: Abdominal hernia - reduced, asymptomatic.   Seen by surgery and no surgery indicated at this time.   Will f/u with surgery as outpatient after completion of abx and IV steroid.        Diagnosis: HTN (hypertension)  Assessment and Plan of Treatment: HTN-  Losartan and hold HCTZ due to soft BP.      Diagnosis: Migraines  Assessment and Plan of Treatment: Migraine - Cont. Topamax     PRINCIPAL DISCHARGE DIAGNOSIS  Diagnosis: Pneumonia  Assessment and Plan of Treatment: AHRF - acute hypoxic respiratory failure due to  PNA - and improved on 1L NC and cntinued to be weaned off on oxygen.   cryptogenic organizing PNA - c/w Robitussin, Tessalon, Hycodan at bedtime. Prednisone taper in progress    Seen and followed by pulmonary   Will followup as an out-patient with pulmonary  Steroid/Prednisone Taper :      Continue Prednisone 60mg ( 6tabs) daily for 3 more days        then,      10/18/22 Prednisone 50mg (5tabs) daily for 7 days        then,      10/25/22 Prednisone 40mg ( 4 tab) daily for 7 days       then,      11/1/22 Prednisone 30mg ( 3 tabs) daily for 7 days       then       11/8/22 Prednisone 20mg (2 tabs) daily for 7 days       then       11/15/22 Prednisone 10mg ( 1tab) daily for 7 days         SECONDARY DISCHARGE DIAGNOSES  Diagnosis: History of open reduction and internal fixation (ORIF) procedure  Assessment and Plan of Treatment: You underwent ORIF 9/1/22 peroprosthetuic fx.  Right Hip Osteotomy Nonunion - as above status post right hip ORIF periprosthetic fx 9/1/2022, pain control with Oxycodone and using Incentive spirometer  WBAT - PT/ OT & Ortho followed. OR Cultures PRELIM rare Pseudomonas.   ID consulted & Cefepime started antibiotics through 10/28/22 for total 8 weeks.   Will need suppression thereafter and will follow up as an out-patien    Diagnosis: Abdominal wall hernia  Assessment and Plan of Treatment: Abdominal hernia - reduced, asymptomatic.   Seen by surgery and no surgery indicated at this time.   Will f/u with surgery as outpatient after completion of abx and IV steroid.        Diagnosis: HTN (hypertension)  Assessment and Plan of Treatment: HTN-  Losartan and hold HCTZ due to soft BP.      Diagnosis: Migraines  Assessment and Plan of Treatment: Migraine - Cont. Topamax     PRINCIPAL DISCHARGE DIAGNOSIS  Diagnosis: Pneumonia  Assessment and Plan of Treatment: AHRF - acute hypoxic respiratory failure due to  PNA - and improved on 1L NC and cntinued to be weaned off on oxygen.   cryptogenic organizing PNA - c/w Robitussin, Tessalon, Hycodan at bedtime. Prednisone taper in progress    Seen and followed by pulmonary   Will followup as an out-patient with pulmonary  Steroid/Prednisone Taper :      Continue Prednisone 60mg ( 6tabs) daily for 3 more days        then,      10/18/22 Prednisone 50mg (5tabs) daily for 7 days        then,      10/25/22 Prednisone 40mg ( 4 tab) daily for 7 days       then,      11/1/22 Prednisone 30mg ( 3 tabs) daily for 7 days       then       11/8/22 Prednisone 20mg (2 tabs) daily for 7 days       then       11/15/22 Prednisone 10mg ( 1tab) daily for 7 days         SECONDARY DISCHARGE DIAGNOSES  Diagnosis: History of open reduction and internal fixation (ORIF) procedure  Assessment and Plan of Treatment: You underwent ORIF 9/1/22 peroprosthetuic fx.  Right Hip Osteotomy Nonunion - as above status post right hip ORIF periprosthetic fx 9/1/2022, pain control with Oxycodone and using Incentive spirometer  WBAT - PT/ OT & Ortho followed. OR Cultures PRELIM rare Pseudomonas.   ID consulted & Cefepime started antibiotics through 10/28/22 for total 8 weeks.   Will need suppression thereafter and will follow up as an out-patien    Diagnosis: Abdominal wall hernia  Assessment and Plan of Treatment: Abdominal hernia - reduced, asymptomatic.   - No acute surgical intervention. Patient needs to fully recover from current pneumonia and infections prior to any elective hernia repair  - Recommend outpatient follow-up for hernia repair with office of Dr. Graham Meier      Diagnosis: HTN (hypertension)  Assessment and Plan of Treatment: HTN-  Losartan and hold HCTZ due to soft BP.      Diagnosis: Migraines  Assessment and Plan of Treatment: Migraine - Cont. Topamax

## 2022-10-12 NOTE — DISCHARGE NOTE PROVIDER - NSDCFUSCHEDAPPT_GEN_ALL_CORE_FT
Jose Chang  [facility] Physician [facility]  ORTHOSURG [address]  Scheduled Appointment: 11/09/2022

## 2022-10-12 NOTE — PROGRESS NOTE ADULT - SUBJECTIVE AND OBJECTIVE BOX
Patient is a 61y old  Female who presents with a chief complaint of abdominal wall hernia non-reducible, nausea (12 Oct 2022 08:03)      INTERVAL HPI/OVERNIGHT EVENTS: no event     Pain Location & Control: ok     MEDICATIONS  (STANDING):  cefepime   IVPB 2000 milliGRAM(s) IV Intermittent every 8 hours  chlorhexidine 4% Liquid 1 Application(s) Topical <User Schedule>  enoxaparin Injectable 40 milliGRAM(s) SubCutaneous every 24 hours  ferrous    sulfate 325 milliGRAM(s) Oral daily  losartan 50 milliGRAM(s) Oral daily  predniSONE   Tablet   Oral   predniSONE   Tablet 60 milliGRAM(s) Oral daily  senna 2 Tablet(s) Oral at bedtime  topiramate 50 milliGRAM(s) Oral daily    MEDICATIONS  (PRN):  acetaminophen     Tablet .. 650 milliGRAM(s) Oral every 6 hours PRN Temp greater or equal to 38C (100.4F), Moderate Pain (4 - 6)  oxyCODONE    IR 5 milliGRAM(s) Oral every 4 hours PRN Severe Pain (7 - 10)  polyethylene glycol 3350 17 Gram(s) Oral daily PRN Constipation  sodium chloride 0.9% lock flush 10 milliLiter(s) IV Push every 1 hour PRN Pre/post blood products, medications, blood draw, and to maintain line patency      Allergies    No Known Allergies    Intolerances    opioid-like analgesics (Vomiting; Nausea)      REVIEW OF SYSTEMS:  CONSTITUTIONAL: No fever, weight loss, or fatigue  EYES: No eye pain, visual disturbances, or discharge  ENMT:  No difficulty hearing, tinnitus, vertigo; No sinus or throat pain  NECK: No pain or stiffness  BREASTS: No pain, masses, or nipple discharge  RESPIRATORY: No cough, wheezing, chills or hemoptysis; No shortness of breath  CARDIOVASCULAR: No chest pain, palpitations, dizziness, or leg swelling  GASTROINTESTINAL: No abdominal or epigastric pain. No nausea, vomiting, or hematemesis; No diarrhea or constipation. No melena or hematochezia.  GENITOURINARY: No dysuria, frequency, hematuria, or incontinence  NEUROLOGICAL: No headaches, memory loss, loss of strength, numbness, or tremors  SKIN: No itching, burning, rashes, or lesions   LYMPH NODES: No enlarged glands  ENDOCRINE: No heat or cold intolerance; No hair loss; No polydipsia or polyuria  MUSCULOSKELETAL: No back pain  PSYCHIATRIC: No depression, anxiety, mood swings, or difficulty sleeping  HEME/LYMPH: No easy bruising, or bleeding gums  ALLERGY AND IMMUNOLOGIC: No hives or eczema    Vital Signs Last 24 Hrs  T(C): 36.9 (12 Oct 2022 17:34), Max: 37.1 (12 Oct 2022 05:01)  T(F): 98.4 (12 Oct 2022 17:34), Max: 98.8 (12 Oct 2022 05:01)  HR: 70 (12 Oct 2022 17:34) (60 - 90)  BP: 147/82 (12 Oct 2022 17:34) (111/73 - 147/82)  BP(mean): --  RR: 18 (12 Oct 2022 17:34) (18 - 18)  SpO2: 93% (12 Oct 2022 17:34) (93% - 95%)    Parameters below as of 12 Oct 2022 17:34  Patient On (Oxygen Delivery Method): room air        PHYSICAL EXAM:  GENERAL: NAD, well-groomed, well-developed  HEAD:  Atraumatic, Normocephalic  EYES: EOMI, PERRLA, conjunctiva and sclera clear  ENMT: No tonsillar erythema, exudates, or enlargement; Moist mucous membranes, Good dentition, No lesions  NECK: Supple, No JVD, Normal thyroid  NERVOUS SYSTEM:  Alert & Oriented X3, Good concentration; Motor Strength 5/5 B/L upper and lower extremities; DTRs 2+ intact and symmetric  CHEST/LUNG: Clear to auscultation bilaterally; No rales, rhonchi, wheezing, or rubs  HEART: Regular rate and rhythm; No murmurs, rubs, or gallops  ABDOMEN: Soft, Nontender, Nondistended; Bowel sounds present  EXTREMITIES:  2+ Peripheral Pulses, No clubbing or cyanosis  LYMPH: No lymphadenopathy noted  SKIN: No rashes or lesions      LABS:              CAPILLARY BLOOD GLUCOSE            Cultures  Culture Results:   No Growth Final (10-07-22 @ 00:10)  Culture Results:   No Growth Final (10-06-22 @ 23:40)  Culture Results:   10,000 - 49,000 CFU/mL Enterococcus faecium (10-06-22 @ 18:03)      RADIOLOGY & ADDITIONAL TESTS:    Imaging Personally Reviewed:  [ x] YES  [ ] NO    Consultant(s) Notes Reviewed:  [ x] YES  [ ] NO    Care Discussed with Consultants/Other Providers [x ] YES  [ ] NO

## 2022-10-12 NOTE — PROGRESS NOTE ADULT - ASSESSMENT
60 y/o female medical history sig for Asthma, obesity, JOSE LUIS not on NIV, Migraines, HTN who was accepted for transfer from [facility] by Dr. Byrnes for evaluation of previous right THR on 5/2/22. Patient was admitted to [facility] on 8/27 s/p fall and found to have fractured femoral prosthesis/ non union and may need bone graft. She denies any LOC or head trauma from fall, describes fall as leg giving out. She was seen by Cardiology prior to transfer and deemed stable for surgery. Denies any tori-operative issues in 5/22 during last revision.    cryptogenic organizing PNA- Continue Robitussin, Tessalon, Hycodan at bedtime. Continue SQ Lovenox. Continue Prednisone.   acute hypoxic respiratory failure due to  PNA-improved on 1L nasal cannula only. Continue wean her off on oxygen.   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 WBAT. PT/ OT. Ortho following . OR Cultures PRELIM rare Pseudomonas. ID consulted and Cefepime started antibiotics through 10/28/22 for total 8 weeks. Will need suppression thereafter.  HTN-  Losartan and hold HCTZ due to soft BP.  Migraine - Cont. Topamax  JOSE LUIS- Not on NIV HS. O/P pulm f/u  Asthma- PRN nebs  DVT Prophylaxis -- Lovenox SQ   Abdominal hernia - reduced, asymptomatic. No surgery indicated at this time. F/u with surgery as outpatient  after completion of abx and IV steroid.  continue abdominal binder.

## 2022-10-12 NOTE — DISCHARGE NOTE PROVIDER - CARE PROVIDERS DIRECT ADDRESSES
,DirectAddress_Unknown,DirectAddress_Unknown,[email or web address] ,DirectAddress_Unknown,DirectAddress_Unknown,[email or web address]

## 2022-10-12 NOTE — DISCHARGE NOTE PROVIDER - HOSPITAL COURSE
62 y/o female medical history sig for Asthma, obesity, JOSE LUIS not on NIV, Migraines, HTN who was accepted for transfer from [facility] by Dr. Byrnes for evaluation of previous right THR on 5/2/22. Patient was admitted to [facility] on 8/27 s/p fall and found to have fractured femoral prosthesis/ non union and may need bone graft. She denies any LOC or head trauma from fall, describes fall as leg giving out. She was seen by Cardiology prior to transfer and deemed stable for surgery. Denies any tori-operative issues in 5/22 during last revision.    AHRF - acute hypoxic respiratory failure due to  PNA - and improved on 1L NC Continue weaned off on oxygen.   cryptogenic organizing PNA - c/w Robitussin, Tessalon, Hycodan at bedtime. Prednisone taper in progress    Seen and followed by pulmonary      Right Hip Osteotomy Nonunion - s/p right hip ORIF periprosthetic fx 9/1/2022, pain control with Oxycodone and using Incentive spirometer  WBAT - PT/ OT & Ortho followed. OR Cultures PRELIM rare Pseudomonas. ID consulted & Cefepime started antibiotics through 10/28/22 for total 8 weeks.   Will need suppression thereafter.    HTN-  Losartan and hold HCTZ due to soft BP.    Migraine - Cont. Topamax    JOSE LUIS - Not on NIV HS. Pulm f/u as an out-pt     Asthma- PRN nebs    DVT Prophylaxis -- Lovenox SQ     On 10/12 pt. planned for discharge to [facility]

## 2022-10-13 PROCEDURE — 71250 CT THORAX DX C-: CPT | Mod: 26

## 2022-10-13 RX ADMIN — CEFEPIME 100 MILLIGRAM(S): 1 INJECTION, POWDER, FOR SOLUTION INTRAMUSCULAR; INTRAVENOUS at 09:37

## 2022-10-13 RX ADMIN — SENNA PLUS 2 TABLET(S): 8.6 TABLET ORAL at 21:19

## 2022-10-13 RX ADMIN — CEFEPIME 100 MILLIGRAM(S): 1 INJECTION, POWDER, FOR SOLUTION INTRAMUSCULAR; INTRAVENOUS at 18:03

## 2022-10-13 RX ADMIN — LOSARTAN POTASSIUM 50 MILLIGRAM(S): 100 TABLET, FILM COATED ORAL at 05:14

## 2022-10-13 RX ADMIN — Medication 325 MILLIGRAM(S): at 12:41

## 2022-10-13 RX ADMIN — ENOXAPARIN SODIUM 40 MILLIGRAM(S): 100 INJECTION SUBCUTANEOUS at 05:16

## 2022-10-13 RX ADMIN — Medication 50 MILLIGRAM(S): at 12:41

## 2022-10-13 RX ADMIN — CEFEPIME 100 MILLIGRAM(S): 1 INJECTION, POWDER, FOR SOLUTION INTRAMUSCULAR; INTRAVENOUS at 00:46

## 2022-10-13 RX ADMIN — Medication 60 MILLIGRAM(S): at 05:16

## 2022-10-13 RX ADMIN — Medication 650 MILLIGRAM(S): at 18:11

## 2022-10-13 NOTE — PROGRESS NOTE ADULT - SUBJECTIVE AND OBJECTIVE BOX
Patient is a 61y old  Female who presents with a chief complaint of abdominal wall hernia non-reducible, nausea (12 Oct 2022 22:11)      INTERVAL HPI/OVERNIGHT EVENTS: No event overnight. Vitals stable. Continue Prednisone tapering. Quantiferon test was negative. Patient is medically clear for discharge. F/u surgeon and pulmonologist as outpatient.     Pain Location & Control: OK    MEDICATIONS  (STANDING):  cefepime   IVPB 2000 milliGRAM(s) IV Intermittent every 8 hours  chlorhexidine 4% Liquid 1 Application(s) Topical <User Schedule>  enoxaparin Injectable 40 milliGRAM(s) SubCutaneous every 24 hours  ferrous    sulfate 325 milliGRAM(s) Oral daily  losartan 50 milliGRAM(s) Oral daily  predniSONE   Tablet   Oral   predniSONE   Tablet 60 milliGRAM(s) Oral daily  senna 2 Tablet(s) Oral at bedtime  topiramate 50 milliGRAM(s) Oral daily    MEDICATIONS  (PRN):  acetaminophen     Tablet .. 650 milliGRAM(s) Oral every 6 hours PRN Temp greater or equal to 38C (100.4F), Moderate Pain (4 - 6)  oxyCODONE    IR 5 milliGRAM(s) Oral every 4 hours PRN Severe Pain (7 - 10)  polyethylene glycol 3350 17 Gram(s) Oral daily PRN Constipation  sodium chloride 0.9% lock flush 10 milliLiter(s) IV Push every 1 hour PRN Pre/post blood products, medications, blood draw, and to maintain line patency      Allergies    No Known Allergies    Intolerances    opioid-like analgesics (Vomiting; Nausea)      REVIEW OF SYSTEMS:  CONSTITUTIONAL: No fever, weight loss, or fatigue  EYES: No eye pain, visual disturbances, or discharge  ENMT:  No difficulty hearing, tinnitus, vertigo; No sinus or throat pain  NECK: No pain or stiffness  BREASTS: No pain, masses, or nipple discharge  RESPIRATORY: No cough, wheezing, chills or hemoptysis; No shortness of breath  CARDIOVASCULAR: No chest pain, palpitations, dizziness, or leg swelling  GASTROINTESTINAL: No abdominal or epigastric pain. No nausea, vomiting, or hematemesis; No diarrhea or constipation. No melena or hematochezia.  GENITOURINARY: No dysuria, frequency, hematuria, or incontinence  NEUROLOGICAL: No headaches, memory loss, loss of strength, numbness, or tremors  SKIN: No itching, burning, rashes, or lesions   LYMPH NODES: No enlarged glands  ENDOCRINE: No heat or cold intolerance; No hair loss; No polydipsia or polyuria  MUSCULOSKELETAL: No back pain  PSYCHIATRIC: No depression, anxiety, mood swings, or difficulty sleeping  HEME/LYMPH: No easy bruising, or bleeding gums  ALLERGY AND IMMUNOLOGIC: No hives or eczema    Vital Signs Last 24 Hrs  T(C): 36.8 (13 Oct 2022 17:20), Max: 36.8 (12 Oct 2022 22:13)  T(F): 98.3 (13 Oct 2022 17:20), Max: 98.3 (13 Oct 2022 17:20)  HR: 77 (13 Oct 2022 17:20) (66 - 94)  BP: 123/78 (13 Oct 2022 17:20) (117/82 - 143/91)  BP(mean): --  RR: 18 (13 Oct 2022 17:20) (18 - 18)  SpO2: 94% (13 Oct 2022 17:20) (93% - 96%)    Parameters below as of 13 Oct 2022 17:20  Patient On (Oxygen Delivery Method): room air        PHYSICAL EXAM:  GENERAL: NAD, well-groomed, well-developed  HEAD:  Atraumatic, Normocephalic  EYES: EOMI, PERRLA, conjunctiva and sclera clear  ENMT: No tonsillar erythema, exudates, or enlargement; Moist mucous membranes, Good dentition, No lesions  NECK: Supple, No JVD, Normal thyroid  NERVOUS SYSTEM:  Alert & Oriented X3, Good concentration; Motor Strength 5/5 B/L upper and lower extremities; DTRs 2+ intact and symmetric  CHEST/LUNG: Clear to auscultation bilaterally; No rales, rhonchi, wheezing, or rubs  HEART: Regular rate and rhythm; No murmurs, rubs, or gallops  ABDOMEN: Soft, Nontender, Nondistended; Bowel sounds present  EXTREMITIES:  2+ Peripheral Pulses, No clubbing or cyanosis  LYMPH: No lymphadenopathy noted  SKIN: R hip healing surgical wound    LABS:              CAPILLARY BLOOD GLUCOSE            Cultures  Culture Results:   No Growth Final (10-07-22 @ 00:10)  Culture Results:   No Growth Final (10-06-22 @ 23:40)  Culture Results:   10,000 - 49,000 CFU/mL Enterococcus faecium (10-06-22 @ 18:03)      RADIOLOGY & ADDITIONAL TESTS:    Imaging Personally Reviewed:  [X ] YES  [ ] NO    Consultant(s) Notes Reviewed:  [ X] YES  [ ] NO    Care Discussed with Consultants/Other Providers [ X] YES  [ ] NO

## 2022-10-13 NOTE — PROGRESS NOTE ADULT - ASSESSMENT
62 y/o female medical history sig for Asthma, obesity, JOSE LUIS not on NIV, Migraines, HTN who was accepted for transfer from [facility] by Dr. Byrnes for evaluation of previous right THR on 5/2/22. Patient was admitted to [facility] on 8/27 s/p fall and found to have fractured femoral prosthesis/ non union and may need bone graft. She denies any LOC or head trauma from fall, describes fall as leg giving out. She was seen by Cardiology prior to transfer and deemed stable for surgery. Denies any tori-operative issues in 5/22 during last revision.    cryptogenic organizing PNA- Continue Robitussin, Tessalon, Hycodan at bedtime. Continue SQ Lovenox. Continue Prednisone.   acute hypoxic respiratory failure due to  PNA-improved on 1L nasal cannula only. Continue wean her off on oxygen.   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 WBAT. PT/ OT. Ortho following . OR Cultures PRELIM rare Pseudomonas. ID consulted and Cefepime started antibiotics through 10/28/22 for total 8 weeks. Will need suppression thereafter.  HTN-  Losartan and hold HCTZ due to soft BP.  Migraine - Cont. Topamax  JOSE LUIS- Not on NIV HS. O/P pulm f/u  Asthma- PRN nebs  DVT Prophylaxis -- Lovenox SQ   Abdominal hernia - reduced, asymptomatic. No surgery indicated at this time. F/u with surgery as outpatient  after completion of abx and IV steroid.  continue abdominal binder.

## 2022-10-13 NOTE — PROGRESS NOTE ADULT - SUBJECTIVE AND OBJECTIVE BOX
[facility] - PROGRESS NOTE    CHIEF COMPLAINT: hypoxemia; asthma; obesity -> obstructive sleep apnea; abnormal chest CT; cryptogenic organizing pneumonia;     INTERVAL HISTORY: improved; decreased duration of night sweats last night; no fevers or chills; no shortness of breath or hypoxemia on room air ambulating with a walker about the gonzalez; minimal cough without sputum production; no chest congestion or wheeze; no fatigue, malaise or weakness: good appetite; resolved anterior chest pain with inspiration; discharge on hold awaiting insurance approval for return to rehab following hip surgery    REVIEW OF SYSTEMS:  Constitutional: As per interval history  HEENT: Within normal limits  CV: As per interval history  Resp: As per interval history  GI: Within normal limits   : Within normal limits  Musculoskeletal: right femoral fracture and repair  Skin: Within normal limits  Neurological: Within normal limits  Psychiatric: Within normal limits  Endocrine: Within normal limits  Hematologic/Lymphatic: Within normal limits  Allergic/Immunologic: Within normal limits    MEDICATIONS:     Pulmonary "    Anti-microbials:  cefepime   IVPB 2000 milliGRAM(s) IV Intermittent every 8 hours    Cardiovascular:  losartan 50 milliGRAM(s) Oral daily    Other:  chlorhexidine 4% Liquid 1 Application(s) Topical <User Schedule>  enoxaparin Injectable 40 milliGRAM(s) SubCutaneous every 24 hours  ferrous    sulfate 325 milliGRAM(s) Oral daily  predniSONE   Tablet   Oral   predniSONE   Tablet 60 milliGRAM(s) Oral daily  senna 2 Tablet(s) Oral at bedtime  topiramate 50 milliGRAM(s) Oral daily    MEDICATIONS  (PRN):  acetaminophen     Tablet .. 650 milliGRAM(s) Oral every 6 hours PRN Temp greater or equal to 38C (100.4F), Moderate Pain (4 - 6)  oxyCODONE    IR 5 milliGRAM(s) Oral every 4 hours PRN Severe Pain (7 - 10)  polyethylene glycol 3350 17 Gram(s) Oral daily PRN Constipation  sodium chloride 0.9% lock flush 10 milliLiter(s) IV Push every 1 hour PRN Pre/post blood products, medications, blood draw, and to maintain line patency        OBJECTIVE:    PHYSICAL EXAM:       ICU Vital Signs Last 24 Hrs  T(C): 36.5 (13 Oct 2022 04:39), Max: 36.9 (12 Oct 2022 09:57)  T(F): 97.7 (13 Oct 2022 04:39), Max: 98.4 (12 Oct 2022 09:57)  HR: 66 (13 Oct 2022 04:39) (66 - 90)  BP: 132/85 (13 Oct 2022 04:39) (111/73 - 147/82)  BP(mean): --  ABP: --  ABP(mean): --  RR: 18 (13 Oct 2022 04:39) (18 - 18)  SpO2: 95% (13 Oct 2022 04:39) (93% - 95%) on room air     General: Awake. Alert. Cooperative. No distress. Appears stated age. Sitting in the chair.  HEENT: Atraumatic. Normocephalic. Anicteric. Normal oral mucosa. PERRL. EOMI. Mallampati class IV airway  Neck: Supple. Trachea midline. Thyroid without enlargement/tenderness/nodules. No carotid bruit. No JVD. Short and wide.  Cardiovascular: Regular rate and rhythm. S1 S2 normal. No murmurs, rubs or gallops.  Respiratory: Respirations unlabored. Decreased rales. No curvature.  Abdomen: Soft. Non-tender. Non-distended. No organomegaly. No masses. Normal bowel sounds.  Extremities: Warm to touch. No clubbing or cyanosis. No pedal edema. Decreased right hip range of motion.  Pulses: 2+ peripheral pulses all extremities.	  Skin: Healing surgical incision right hip.  Lymph Nodes: Cervical, supraclavicular and axillary nodes normal  Neurological: Motor and sensory examination equal and normal. A and O x 3  Psychiatry: Appropriate mood and affect.    LABS:      CBC    WBC  19.01 <==, 26.21 <==, 14.82 <==, 14.58 <==    Hemoglobin  9.5 <<==, 9.3 <<==, 9.7 <<==, 9.8 <<==    Hematocrit  30.2 <==, 29.1 <==, 31.1 <==, 31.4 <==    Platelets  771 <==, 698 <==, 681 <==, 663 <==      138  |  105  |  27<H>  ----------------------------<  141<H>    10-10  4.6   |  21<L>  |  0.62      LYTES    sodium  138 <==, 137 <==, 136 <==, 135 <==    potassium   4.6 <==, 4.9 <==, 4.6 <==, 4.7 <==    chloride  105 <==, 105 <==, 104 <==, 102 <==    carbon dioxide  21 <==, 21 <==, 21 <==, 21 <==    =============================================================================================  RENAL FUNCTION:    Creatinine:   0.62  <<==, 0.52  <<==, 0.45  <<==, 0.59  <<==    BUN:   27 <==, 25 <==, 15 <==, 11 <==    ============================================================================================    calcium   9.7 <==, 9.7 <==, 10.0 <==, 9.7 <==    phos   3.8 <==    mag   2.3 <==    ============================================================================================  LFTs    AST:   7 <== , 14 <==     ALT:  15  <== , 13  <==     AP:  81  <=, 84  <=    Bili:  0.1  <=, 0.2  <=    Serum Pro-Brain Natriuretic Peptide: 48 pg/mL (10-06 @ 17:18)    CARDIAC MARKERS ( 06 Oct 2022 17:18 )  CPK x     /CKMB x     /CKMB Units x        troponin 25 ng/L    MICROBIOLOGY:     Respiratory Viral Panel with COVID-19 by TARA (10.06.22 @ 17:24)   Rapid RVP Result: [facility]   SARS-CoV-2: [facility]  This Respiratory Panel uses polymerase chain reaction (PCR) to detect for adenovirus; coronavirus (HKU1, NL63, 229E, OC43); human metapneumovirus (hMPV); human enterovirus/rhinovirus (Entero/RV); influenza A; influenza A/H1; influenza A/H3; influenza A/H1-2009; influenza B; parainfluenza viruses 1, 2, 3, 4; respiratory syncytial virus; Mycoplasma pneumoniae; Chlamydophila pneumoniae; and SARS-CoV-2.     Legionella pneumophila Antigen, Urine (10.07.22 @ 19:52)   Legionella Antigen, Urine: Negative:    Culture - Blood (10.07.22 @ 00:10)   Specimen Source: .Blood Blood-Peripheral   Culture Results:   No growth to date.     Culture - Blood (10.06.22 @ 23:40)   Specimen Source: .Blood Blood-Peripheral   Culture Results:   No growth to date.     Urinalysis Basic - ( 06 Oct 2022 18:03 )    Color: Light Yellow / Appearance: Clear / S.010 / pH: x  Gluc: x / Ketone: Negative  / Bili: Negative / Urobili: Negative   Blood: x / Protein: Trace / Nitrite: Negative   Leuk Esterase: Negative / RBC: 1 /hpf / WBC 3 /HPF   Sq Epi: x / Non Sq Epi: 6 /hpf / Bacteria: Negative    Culture - Urine (10.06.22 @ 18:03)   - Nitrofurantoin: I 64 Should not be used to treat pyelonephritis.   - Tetra/Doxy: R >8   - Ciprofloxacin: R >2   - Levofloxacin: R >4   - Vancomycin: R >16   - Ampicillin: R >8 Predicts results to ampicillin/sulbactam, amoxacillin-clavulanate and piperacillin-tazobactam.   Specimen Source: Clean Catch Clean Catch (Midstream)   Culture Results:   10,000 - 49,000 CFU/mL Enterococcus faecium   Organism Identification: Enterococcus faecium     Culture - Urine (10.06.22 @ 18:03)   Specimen Source: Clean Catch Clean Catch (Midstream)   Culture Results:   10,000 - 49,000 CFU/mL Gram Positive Cocci in Pairs and Chains     Culture - Tissue with Gram Stain (22 @ 08:59)   - Piperacillin/Tazobactam: S <=8   - Tobramycin: S <=2   Gram Stain:   No polymorphonuclear leukocytes per low power field   No organisms seen per oil power field   - Amikacin: S <=16   - Aztreonam: S <=4   - Cefepime: S <=2   - Ceftazidime: S <=1   - Ciprofloxacin: S <=0.25   - Gentamicin: S <=2   - Imipenem: S <=1   - Levofloxacin: S <=0.5   - Meropenem: S <=1     Specimen Source: .Tissue Right hip Bone Culture   Culture Results:   Rare Pseudomonas aeruginosa   Organism Identification: Pseudomonas aeruginosa   Organism: Pseudomonas aeruginosa   Method Type: [facility]     RADIOLOGY:  [x ] Chest radiographs reviewed and interpreted by me    EXAM:  XR CHEST PA LAT 2V                          PROCEDURE DATE:  10/06/2022      FINDINGS:  Scattered bilateral opacities may represent multifocal pneumonia in the appropriate clinical setting. Small left pleural effusion with associated basilar atelectasis.  The heart is normal in size.  There is no pneumothorax.    IMPRESSION:  Scattered bilateral opacities may represent multifocal pneumonia in the appropriate clinical setting. Small left pleural effusion with associated   basilar atelectasis.    ELLYN JOSHI MD; Resident Radiologist  This document has been electronically signed.  SOFI ZHENG MD; Attending Radiologist  This document has been electronically signed. Oct  7 2022  8:30AM  ---------------------------------------------------------------------------------------------------------------  EXAM:  CT ABDOMEN AND PELVIS IC                        EXAM[facility]  [facility]                          PROCEDURE DATE:  10/06/2022      FINDINGS:  CHEST:  LUNGS AND LARGE AIRWAYS: Patent central airways. Multiple bilateral peripheral infiltrates suggestive of Covid pneumonia. Please correlate with Covid status.  PLEURA: No pleural effusion.  VESSELS: Left-sided PICC line is seen with the tip in the superior vena cava  HEART: Heart size is normal. No pericardial effusion.  MEDIASTINUM AND DANA: No lymphadenopathy.  CHEST WALL AND LOWER NECK: Within normal limits.    ABDOMEN AND PELVIS:  LIVER: Within normal limits.  BILE DUCTS: Normal caliber.  GALLBLADDER: Within normal limits.  SPLEEN: Within normal limits.  PANCREAS: Within normal limits.  ADRENALS: Within normal limits.  KIDNEYS/URETERS: Within normal limits.    BLADDER: Within normal limits.  REPRODUCTIVE ORGANS: Uterus and adnexa within normal limits.    BOWEL: No bowel obstruction. Appendix is not visualized. No evidence of   inflammation in the pericecal region.  PERITONEUM: No ascites.  VESSELS: Atherosclerotic changes.  RETROPERITONEUM/LYMPH NODES: No lymphadenopathy.  ABDOMINAL WALL: Supraumbilical fat-containing hernia to left of midline.   Small fat-containing umbilical hernia also seen.  BONES: Degenerative changes.    IMPRESSION: No evidence of pulmonary embolism.  Extensive bilateral peripheral infiltrates may be seen in the setting of Covid pneumonia. Please correlate with Covid testing.  No acute pathology in the abdomen and pelvis small fat-containing ventral hernias are appreciated    HEAVEN PADGETT MD; Attending Radiologist  This document has been electronically signed. Oct  6 2022  6:52PM  ---------------------------------------------------------------------------------------------------------------

## 2022-10-13 NOTE — PROGRESS NOTE ADULT - ASSESSMENT
ASSESSMENT:    61 year old gentlewoman, lifelong non-smoker, with history of obesity related obstructive sleep apnea (non-adherent with prescribed CPAP) and mild asthma on albuterol as needed. The patient underwent a right hip replacement revision in May. Due to non-healing, she underwent implantation of a proximal femur hook plate, cables and an allograft femur strut in September. She has been on cefepime for ~ 5 weeks due to pseudomonas found on tissue culture. She was discharged to rehab but returned to the ER yesterday with abdominal discomfort due to a hernia that has been reduced. She has no abdominal pain at present. The patient has been feeling unwell for ~ 1 week. She has fatigue, malaise and weakness. She has been having low grade fevers associated with nighttime fevers and drenching sweats. She has been short of breath with a cough especially when inspiring. She has anterior chest pain exacerbated by cough and breathing. She has no chest congestion or wheeze. In the ER, the patient was found to be hypoxic.     CT scan ->  no evidence of pulmonary embolism - extensive bilateral peripheral infiltrates may be seen in the setting of Covid pneumonia - no acute pathology in the abdomen and pelvis - small fat-containing ventral hernias are appreciated    the patient has hypoxemia in the setting of fatigue, malaise and weakness, low grade fevers associated with nighttime chills and drenching sweats, dyspnea/hypoxemia and a cough with pleuritic chest pain - CT scan has peripheral opacities in both the upper and lower lobes - it would be unusual for the patient to have developed bacterial pneumonia while on cefepime for a pseudomonal joint infection - suspect that the patient has cryptogenic organizing pneumonia rather than eosinophilic pneumonia which tends to be upper lobe predominant    10/10 - much improved except for ongoing nocturnal chills and sweats without fevers; no shortness of breath or hypoxemia on a 1lpm nasal canula; cough is much improved and without sputum production; no chest congestion or wheeze; no fatigue, malaise or weakness: good appetite; minimal anterior chest pain with inspiration;     thrombocytosis     leukocytosis more likely related to high dose steroids than infection    PLAN/RECOMMENDATIONS:    stable oxygenation on room air at rest and with ambulation  observe off nebs  prednisone 60mg daily -> taper by 10mg weekly -> has responded well to empiric steroids  continues on a long course of cefepime for pseudomonas in her right hip prothesis  has not received treatment for Enterococcus faecium in a urine culture  diligent DVT prophylaxis - SQ lovenox  topiramate  physical therapy    Thank you for the courtesy of this referral. Plan of care discussed with the patient at bedside. Discharge planning. The patient will follow-up with Dr. Kirk Smith in our office after discharge from rehab - earlier if her night sweats do not resolve. She has my cell phone number to call with acute issues.    Chris Lomas MD, [facility]  [phone]  Pulmonary Medicine

## 2022-10-14 LAB — SARS-COV-2 RNA SPEC QL NAA+PROBE: SIGNIFICANT CHANGE UP

## 2022-10-14 RX ADMIN — Medication 325 MILLIGRAM(S): at 11:23

## 2022-10-14 RX ADMIN — Medication 50 MILLIGRAM(S): at 16:05

## 2022-10-14 RX ADMIN — CEFEPIME 100 MILLIGRAM(S): 1 INJECTION, POWDER, FOR SOLUTION INTRAMUSCULAR; INTRAVENOUS at 17:07

## 2022-10-14 RX ADMIN — CEFEPIME 100 MILLIGRAM(S): 1 INJECTION, POWDER, FOR SOLUTION INTRAMUSCULAR; INTRAVENOUS at 00:53

## 2022-10-14 RX ADMIN — CHLORHEXIDINE GLUCONATE 1 APPLICATION(S): 213 SOLUTION TOPICAL at 10:16

## 2022-10-14 RX ADMIN — CEFEPIME 100 MILLIGRAM(S): 1 INJECTION, POWDER, FOR SOLUTION INTRAMUSCULAR; INTRAVENOUS at 09:51

## 2022-10-14 RX ADMIN — SENNA PLUS 1 TABLET(S): 8.6 TABLET ORAL at 21:19

## 2022-10-14 RX ADMIN — Medication 60 MILLIGRAM(S): at 05:15

## 2022-10-14 RX ADMIN — ENOXAPARIN SODIUM 40 MILLIGRAM(S): 100 INJECTION SUBCUTANEOUS at 05:15

## 2022-10-14 RX ADMIN — LOSARTAN POTASSIUM 50 MILLIGRAM(S): 100 TABLET, FILM COATED ORAL at 05:15

## 2022-10-14 NOTE — PROGRESS NOTE ADULT - ASSESSMENT
60 y/o female medical history sig for Asthma, obesity, JOSE LUIS not on NIV, Migraines, HTN who was accepted for transfer from [facility] by Dr. Byrnes for evaluation of previous right THR on 5/2/22. Patient was admitted to [facility] on 8/27 s/p fall and found to have fractured femoral prosthesis/ non union and may need bone graft. She denies any LOC or head trauma from fall, describes fall as leg giving out. She was seen by Cardiology prior to transfer and deemed stable for surgery. Denies any tori-operative issues in 5/22 during last revision.    cryptogenic organizing PNA- Continue Robitussin, Tessalon, Hycodan at bedtime. Continue SQ Lovenox. Continue Prednisone.   acute hypoxic respiratory failure due to  PNA-improved on 1L nasal cannula only. Continue wean her off on oxygen.   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 WBAT. PT/ OT. Ortho following . OR Cultures PRELIM rare Pseudomonas. ID consulted and Cefepime started antibiotics through 10/28/22 for total 8 weeks. Will need suppression thereafter.  HTN-  Losartan and hold HCTZ due to soft BP.  Migraine - Cont. Topamax  JOSE LUIS- Not on NIV HS. O/P pulm f/u  Asthma- PRN nebs  DVT Prophylaxis -- Lovenox SQ   Abdominal hernia - reduced, asymptomatic. No surgery indicated at this time. F/u with surgery as outpatient  after completion of abx and IV steroid.  continue abdominal binder.

## 2022-10-14 NOTE — PROGRESS NOTE ADULT - SUBJECTIVE AND OBJECTIVE BOX
Patient is a 61y old  Female who presents with a chief complaint of abdominal wall hernia non-reducible, nausea (13 Oct 2022 17:37)      INTERVAL HPI/OVERNIGHT EVENTS: No event overnight. Patient is medically clear. CT chest was requested by pulmonologist for re-evaluation of her cryptogenic organizing PNA  which is improving. Continue steroid tapering. Patient medically clear for discharge, waiting for bed in [facility].     Pain Location & Control: OK    MEDICATIONS  (STANDING):  cefepime   IVPB 2000 milliGRAM(s) IV Intermittent every 8 hours  chlorhexidine 4% Liquid 1 Application(s) Topical <User Schedule>  enoxaparin Injectable 40 milliGRAM(s) SubCutaneous every 24 hours  ferrous    sulfate 325 milliGRAM(s) Oral daily  losartan 50 milliGRAM(s) Oral daily  predniSONE   Tablet   Oral   predniSONE   Tablet 60 milliGRAM(s) Oral daily  senna 2 Tablet(s) Oral at bedtime  topiramate 50 milliGRAM(s) Oral daily    MEDICATIONS  (PRN):  acetaminophen     Tablet .. 650 milliGRAM(s) Oral every 6 hours PRN Temp greater or equal to 38C (100.4F), Moderate Pain (4 - 6)  polyethylene glycol 3350 17 Gram(s) Oral daily PRN Constipation  sodium chloride 0.9% lock flush 10 milliLiter(s) IV Push every 1 hour PRN Pre/post blood products, medications, blood draw, and to maintain line patency      Allergies    No Known Allergies    Intolerances    opioid-like analgesics (Vomiting; Nausea)      REVIEW OF SYSTEMS:  CONSTITUTIONAL: No fever, weight loss, or fatigue  EYES: No eye pain, visual disturbances, or discharge  ENMT:  No difficulty hearing, tinnitus, vertigo; No sinus or throat pain  NECK: No pain or stiffness  BREASTS: No pain, masses, or nipple discharge  RESPIRATORY: No cough, wheezing, chills or hemoptysis; No shortness of breath  CARDIOVASCULAR: No chest pain, palpitations, dizziness, or leg swelling  GASTROINTESTINAL: No abdominal or epigastric pain. No nausea, vomiting, or hematemesis; No diarrhea or constipation. No melena or hematochezia.  GENITOURINARY: No dysuria, frequency, hematuria, or incontinence  NEUROLOGICAL: No headaches, memory loss, loss of strength, numbness, or tremors  SKIN: No itching, burning, rashes, or lesions   LYMPH NODES: No enlarged glands  ENDOCRINE: No heat or cold intolerance; No hair loss; No polydipsia or polyuria  MUSCULOSKELETAL: No back pain  PSYCHIATRIC: No depression, anxiety, mood swings, or difficulty sleeping  HEME/LYMPH: No easy bruising, or bleeding gums  ALLERGY AND IMMUNOLOGIC: No hives or eczema    Vital Signs Last 24 Hrs  T(C): 36.9 (14 Oct 2022 16:58), Max: 37.1 (14 Oct 2022 13:30)  T(F): 98.5 (14 Oct 2022 16:58), Max: 98.8 (14 Oct 2022 13:30)  HR: 88 (14 Oct 2022 16:58) (70 - 92)  BP: 140/93 (14 Oct 2022 16:58) (112/74 - 140/93)  BP(mean): --  RR: 18 (14 Oct 2022 16:58) (18 - 18)  SpO2: 96% (14 Oct 2022 16:58) (95% - 96%)    Parameters below as of 14 Oct 2022 16:58  Patient On (Oxygen Delivery Method): room air        PHYSICAL EXAM:  GENERAL: NAD, well-groomed, well-developed  HEAD:  Atraumatic, Normocephalic  EYES: EOMI, PERRLA, conjunctiva and sclera clear  ENMT: No tonsillar erythema, exudates, or enlargement; Moist mucous membranes, Good dentition, No lesions  NECK: Supple, No JVD, Normal thyroid  NERVOUS SYSTEM:  Alert & Oriented X3, Good concentration; Motor Strength 5/5 B/L upper and lower extremities; DTRs 2+ intact and symmetric  CHEST/LUNG: Clear to auscultation bilaterally; No rales, rhonchi, wheezing, or rubs  HEART: Regular rate and rhythm; No murmurs, rubs, or gallops  ABDOMEN: Soft, Nontender, Nondistended; Bowel sounds present  EXTREMITIES:  2+ Peripheral Pulses, No clubbing or cyanosis  LYMPH: No lymphadenopathy noted  SKIN: R hip surgical wound      LABS:              CAPILLARY BLOOD GLUCOSE            Cultures      RADIOLOGY & ADDITIONAL TESTS:    Imaging Personally Reviewed:  [X ] YES  [ ] NO    Consultant(s) Notes Reviewed:  [ X] YES  [ ] NO    Care Discussed with Consultants/Other Providers [X ] YES  [ ] NO

## 2022-10-14 NOTE — PROGRESS NOTE ADULT - TIME BILLING
Discussed with PA
Discussed with PA
Discussed with nurses
Discussed with PA
d/w nurses for discharge
Discussed with pulmonologist

## 2022-10-14 NOTE — PROGRESS NOTE ADULT - SUBJECTIVE AND OBJECTIVE BOX
[facility] - PROGRESS NOTE    CHIEF COMPLAINT: hypoxemia; asthma; obesity -> obstructive sleep apnea; abnormal chest CT; cryptogenic organizing pneumonia;     INTERVAL HISTORY: improved; repeat chest CT with improved bilateral peripheral consolidations; decreasing night sweats; no fevers or chills; no shortness of breath or hypoxemia on room air ambulating with a walker about the gonzalez; minimal cough without sputum production; no chest congestion or wheeze; no fatigue, malaise or weakness: good appetite; resolved anterior chest pain with inspiration; discharge on hold awaiting insurance approval for return to rehab following hip surgery    REVIEW OF SYSTEMS:  Constitutional: As per interval history  HEENT: Within normal limits  CV: As per interval history  Resp: As per interval history  GI: Within normal limits   : Within normal limits  Musculoskeletal: right femoral fracture and repair  Skin: Within normal limits  Neurological: Within normal limits  Psychiatric: Within normal limits  Endocrine: Within normal limits  Hematologic/Lymphatic: Within normal limits  Allergic/Immunologic: Within normal limits    MEDICATIONS:     Pulmonary "    Anti-microbials:  cefepime   IVPB 2000 milliGRAM(s) IV Intermittent every 8 hours    Cardiovascular:  losartan 50 milliGRAM(s) Oral daily    Other:  chlorhexidine 4% Liquid 1 Application(s) Topical <User Schedule>  enoxaparin Injectable 40 milliGRAM(s) SubCutaneous every 24 hours  ferrous    sulfate 325 milliGRAM(s) Oral daily  predniSONE   Tablet   Oral   predniSONE   Tablet 60 milliGRAM(s) Oral daily  senna 2 Tablet(s) Oral at bedtime  topiramate 50 milliGRAM(s) Oral daily    MEDICATIONS  (PRN):  acetaminophen     Tablet .. 650 milliGRAM(s) Oral every 6 hours PRN Temp greater or equal to 38C (100.4F), Moderate Pain (4 - 6)  polyethylene glycol 3350 17 Gram(s) Oral daily PRN Constipation  sodium chloride 0.9% lock flush 10 milliLiter(s) IV Push every 1 hour PRN Pre/post blood products, medications, blood draw, and to maintain line patency        OBJECTIVE:    PHYSICAL EXAM:       ICU Vital Signs Last 24 Hrs  T(C): 36.5 (14 Oct 2022 04:24), Max: 36.9 (14 Oct 2022 01:02)  T(F): 97.7 (14 Oct 2022 04:24), Max: 98.4 (14 Oct 2022 01:02)  HR: 70 (14 Oct 2022 04:24) (70 - 94)  BP: 132/84 (14 Oct 2022 04:24) (117/82 - 132/84)  BP(mean): --  ABP: --  ABP(mean): --  RR: 18 (14 Oct 2022 04:24) (18 - 18)  SpO2: 96% (14 Oct 2022 04:24) (93% - 96%) on room air    General: Awake. Alert. Cooperative. No distress. Appears stated age. Sitting in the chair.  HEENT: Atraumatic. Normocephalic. Anicteric. Normal oral mucosa. PERRL. EOMI. Mallampati class IV airway  Neck: Supple. Trachea midline. Thyroid without enlargement/tenderness/nodules. No carotid bruit. No JVD. Short and wide.  Cardiovascular: Regular rate and rhythm. S1 S2 normal. No murmurs, rubs or gallops.  Respiratory: Respirations unlabored. Decreased rales. No curvature.  Abdomen: Soft. Non-tender. Non-distended. No organomegaly. No masses. Normal bowel sounds.  Extremities: Warm to touch. No clubbing or cyanosis. No pedal edema. Decreased right hip range of motion.  Pulses: 2+ peripheral pulses all extremities.	  Skin: Healing surgical incision right hip.  Lymph Nodes: Cervical, supraclavicular and axillary nodes normal  Neurological: Motor and sensory examination equal and normal. A and O x 3  Psychiatry: Appropriate mood and affect.    LABS:      CBC    WBC  19.01 <==, 26.21 <==, 14.82 <==    Hemoglobin  9.5 <<==, 9.3 <<==, 9.7 <<==    Hematocrit  30.2 <==, 29.1 <==, 31.1 <==    Platelets  771 <==, 698 <==, 681 <==      138  |  105  |  27<H>  ----------------------------<  141<H>    10-10  4.6   |  21<L>  |  0.62      LYTES    sodium  138 <==, 137 <==, 136 <==    potassium   4.6 <==, 4.9 <==, 4.6 <==    chloride  105 <==, 105 <==, 104 <==    carbon dioxide  21 <==, 21 <==, 21 <==    =============================================================================================  RENAL FUNCTION:    Creatinine:   0.62  <<==, 0.52  <<==, 0.45  <<==    BUN:   27 <==, 25 <==, 15 <==    ============================================================================================    calcium   9.7 <==, 9.7 <==, 10.0 <==    phos   3.8 <==    mag   2.3 <==    ============================================================================================  LFTs    AST:   7 <==     ALT:  15  <==     AP:  81  <=    Bili:  0.1  <=    Serum Pro-Brain Natriuretic Peptide: 48 pg/mL (10-06 @ 17:18)    CARDIAC MARKERS ( 06 Oct 2022 17:18 )  CPK x     /CKMB x     /CKMB Units x        troponin 25 ng/L    MICROBIOLOGY:     Respiratory Viral Panel with COVID-19 by TARA (10.06.22 @ 17:24)   Rapid RVP Result: NotDetec   SARS-CoV-2: NotDetec  This Respiratory Panel uses polymerase chain reaction (PCR) to detect for adenovirus; coronavirus (HKU1, NL63, 229E, OC43); human metapneumovirus (hMPV); human enterovirus/rhinovirus (Entero/RV); influenza A; influenza A/H1; influenza A/H3; influenza A/H1-2009; influenza B; parainfluenza viruses 1, 2, 3, 4; respiratory syncytial virus; Mycoplasma pneumoniae; Chlamydophila pneumoniae; and SARS-CoV-2.     Legionella pneumophila Antigen, Urine (10.07.22 @ 19:52)   Legionella Antigen, Urine: Negative:    Culture - Blood (10.07.22 @ 00:10)   Specimen Source: .Blood Blood-Peripheral   Culture Results:   No growth to date.     Culture - Blood (10.06.22 @ 23:40)   Specimen Source: .Blood Blood-Peripheral   Culture Results:   No growth to date.     Urinalysis Basic - ( 06 Oct 2022 18:03 )    Color: Light Yellow / Appearance: Clear / S.010 / pH: x  Gluc: x / Ketone: Negative  / Bili: Negative / Urobili: Negative   Blood: x / Protein: Trace / Nitrite: Negative   Leuk Esterase: Negative / RBC: 1 /hpf / WBC 3 /HPF   Sq Epi: x / Non Sq Epi: 6 /hpf / Bacteria: Negative    Culture - Urine (10.06.22 @ 18:03)   - Nitrofurantoin: I 64 Should not be used to treat pyelonephritis.   - Tetra/Doxy: R >8   - Ciprofloxacin: R >2   - Levofloxacin: R >4   - Vancomycin: R >16   - Ampicillin: R >8 Predicts results to ampicillin/sulbactam, amoxacillin-clavulanate and piperacillin-tazobactam.   Specimen Source: Clean Catch Clean Catch (Midstream)   Culture Results:   10,000 - 49,000 CFU/mL Enterococcus faecium   Organism Identification: Enterococcus faecium     Culture - Tissue with Gram Stain (22 @ 08:59)   - Piperacillin/Tazobactam: S <=8   - Tobramycin: S <=2   Gram Stain:   No polymorphonuclear leukocytes per low power field   No organisms seen per oil power field   - Amikacin: S <=16   - Aztreonam: S <=4   - Cefepime: S <=2   - Ceftazidime: S <=1   - Ciprofloxacin: S <=0.25   - Gentamicin: S <=2   - Imipenem: S <=1   - Levofloxacin: S <=0.5   - Meropenem: S <=1     Specimen Source: .Tissue Right hip Bone Culture   Culture Results:   Rare Pseudomonas aeruginosa   Organism Identification: Pseudomonas aeruginosa   Organism: Pseudomonas aeruginosa   Method Type: [facility]     RADIOLOGY:  [x ] Chest radiographs reviewed and interpreted by me    EXAM:  CT CHEST                          PROCEDURE DATE:  10/13/2022      FINDINGS:    Lungs/Airways/Pleura: There are scattered cindy of bilateral, peripheral   opacities which are decreased, and some areas now exhibiting a   perilobular pattern. The airways are patent. No pleural effusions.    Mediastinum/Lymph nodes: No thoracic adenopathy.    Heart and Vessels: The great vessels are normal in size. Left PICC with   tip in the  SVC. The heart is normal in size. Coronary calcifications. No   pericardial effusion.    Upper Abdomen: Unremarkable.    Osseous structures and Soft Tissues: Mild degenerative changes of the   thoracic spine.    IMPRESSION:    Decreased peripheral lung disease compatible with clinically suspected   organizing pneumonia.     SOWMYA CORREA MD; Resident Radiologist  This document has been electronically signed.  MONA PASCAL M.D., ATTENDING RADIOGIST  This document has been electronically signed. Oct 13 2022 11:51AM  ---------------------------------------------------------------------------------------------------------------  EXAM:  XR CHEST PA LAT 2V                          PROCEDURE DATE:  10/06/2022      FINDINGS:  Scattered bilateral opacities may represent multifocal pneumonia in the appropriate clinical setting. Small left pleural effusion with associated basilar atelectasis.  The heart is normal in size.  There is no pneumothorax.    IMPRESSION:  Scattered bilateral opacities may represent multifocal pneumonia in the appropriate clinical setting. Small left pleural effusion with associated   basilar atelectasis.    ELLYN JOSHI MD; Resident Radiologist  This document has been electronically signed.  SOFI ZHENG MD; Attending Radiologist  This document has been electronically signed. Oct  7 2022  8:30AM  ---------------------------------------------------------------------------------------------------------------  EXAM:  CT ABDOMEN AND PELVIS IC                        EXAM:  CT ANGIO CHEST PULM ART [facility]                          PROCEDURE DATE:  10/06/2022      FINDINGS:  CHEST:  LUNGS AND LARGE AIRWAYS: Patent central airways. Multiple bilateral peripheral infiltrates suggestive of Covid pneumonia. Please correlate with Covid status.  PLEURA: No pleural effusion.  VESSELS: Left-sided PICC line is seen with the tip in the superior vena cava  HEART: Heart size is normal. No pericardial effusion.  MEDIASTINUM AND DANA: No lymphadenopathy.  CHEST WALL AND LOWER NECK: Within normal limits.    ABDOMEN AND PELVIS:  LIVER: Within normal limits.  BILE DUCTS: Normal caliber.  GALLBLADDER: Within normal limits.  SPLEEN: Within normal limits.  PANCREAS: Within normal limits.  ADRENALS: Within normal limits.  KIDNEYS/URETERS: Within normal limits.    BLADDER: Within normal limits.  REPRODUCTIVE ORGANS: Uterus and adnexa within normal limits.    BOWEL: No bowel obstruction. Appendix is not visualized. No evidence of   inflammation in the pericecal region.  PERITONEUM: No ascites.  VESSELS: Atherosclerotic changes.  RETROPERITONEUM/LYMPH NODES: No lymphadenopathy.  ABDOMINAL WALL: Supraumbilical fat-containing hernia to left of midline.   Small fat-containing umbilical hernia also seen.  BONES: Degenerative changes.    IMPRESSION: No evidence of pulmonary embolism.  Extensive bilateral peripheral infiltrates may be seen in the setting of Covid pneumonia. Please correlate with Covid testing.  No acute pathology in the abdomen and pelvis small fat-containing ventral hernias are appreciated    HEAVEN PADGETT MD; Attending Radiologist  This document has been electronically signed. Oct  6 2022  6:52PM  ---------------------------------------------------------------------------------------------------------------

## 2022-10-14 NOTE — PROGRESS NOTE ADULT - ASSESSMENT
ASSESSMENT:    61 year old gentlewoman, lifelong non-smoker, with history of obesity related obstructive sleep apnea (non-adherent with prescribed CPAP) and mild asthma on albuterol as needed. The patient underwent a right hip replacement revision in May. Due to non-healing, she underwent implantation of a proximal femur hook plate, cables and an allograft femur strut in September. She has been on cefepime for ~ 5 weeks due to pseudomonas found on tissue culture. She was discharged to rehab but returned to the ER yesterday with abdominal discomfort due to a hernia that has been reduced. She has no abdominal pain at present. The patient has been feeling unwell for ~ 1 week. She has fatigue, malaise and weakness. She has been having low grade fevers associated with nighttime fevers and drenching sweats. She has been short of breath with a cough especially when inspiring. She has anterior chest pain exacerbated by cough and breathing. She has no chest congestion or wheeze. In the ER, the patient was found to be hypoxic.     CT scan ->  no evidence of pulmonary embolism - extensive bilateral peripheral infiltrates may be seen in the setting of Covid pneumonia - no acute pathology in the abdomen and pelvis - small fat-containing ventral hernias are appreciated    the patient has hypoxemia in the setting of fatigue, malaise and weakness, low grade fevers associated with nighttime chills and drenching sweats, dyspnea/hypoxemia and a cough with pleuritic chest pain - CT scan has peripheral opacities in both the upper and lower lobes - it would be unusual for the patient to have developed bacterial pneumonia while on cefepime for a pseudomonal joint infection - suspect that the patient has cryptogenic organizing pneumonia rather than eosinophilic pneumonia which tends to be upper lobe predominant    10/10 - much improved except for ongoing nocturnal chills and sweats without fevers; no shortness of breath or hypoxemia on a 1lpm nasal canula; cough is much improved and without sputum production; no chest congestion or wheeze; no fatigue, malaise or weakness: good appetite; minimal anterior chest pain with inspiration;     thrombocytosis     leukocytosis more likely related to high dose steroids than infection    PLAN/RECOMMENDATIONS:    stable oxygenation on room air at rest and with ambulation  repeat CT - > scattered cindy of bilateral, peripheral opacities which are decreased, and some areas now exhibiting a perilobular pattern compatible with clinically suspected organizing pneumonia  prednisone 60mg daily -> taper by 10mg weekly -> has responded well to empiric steroids  continues on a long course of cefepime for pseudomonas in her right hip prothesis  has not received treatment for Enterococcus faecium in a urine culture  diligent DVT prophylaxis - SQ lovenox  topiramate for migraine headaches  physical therapy    Thank you for the courtesy of this referral. Plan of care discussed with the patient at bedside. Discharge planning. The patient will follow-up with Dr. Kirk Smith in our office after discharge from rehab - earlier if her night sweats do not resolve. She has my cell phone number to call with acute issues.    Chris Lomas MD, [facility]  [phone]  Pulmonary Medicine

## 2022-10-15 ENCOUNTER — TRANSCRIPTION ENCOUNTER (OUTPATIENT)
Age: 62
End: 2022-10-15

## 2022-10-15 VITALS
OXYGEN SATURATION: 94 % | SYSTOLIC BLOOD PRESSURE: 117 MMHG | HEART RATE: 88 BPM | DIASTOLIC BLOOD PRESSURE: 74 MMHG | RESPIRATION RATE: 18 BRPM | TEMPERATURE: 98 F

## 2022-10-15 PROCEDURE — 96374 THER/PROPH/DIAG INJ IV PUSH: CPT

## 2022-10-15 PROCEDURE — 84484 ASSAY OF TROPONIN QUANT: CPT

## 2022-10-15 PROCEDURE — U0003: CPT

## 2022-10-15 PROCEDURE — 85025 COMPLETE CBC W/AUTO DIFF WBC: CPT

## 2022-10-15 PROCEDURE — 82803 BLOOD GASES ANY COMBINATION: CPT

## 2022-10-15 PROCEDURE — 87040 BLOOD CULTURE FOR BACTERIA: CPT

## 2022-10-15 PROCEDURE — 87186 SC STD MICRODIL/AGAR DIL: CPT

## 2022-10-15 PROCEDURE — 83605 ASSAY OF LACTIC ACID: CPT

## 2022-10-15 PROCEDURE — 80048 BASIC METABOLIC PNL TOTAL CA: CPT

## 2022-10-15 PROCEDURE — 87449 NOS EACH ORGANISM AG IA: CPT

## 2022-10-15 PROCEDURE — 36415 COLL VENOUS BLD VENIPUNCTURE: CPT

## 2022-10-15 PROCEDURE — 84132 ASSAY OF SERUM POTASSIUM: CPT

## 2022-10-15 PROCEDURE — 85018 HEMOGLOBIN: CPT

## 2022-10-15 PROCEDURE — 97530 THERAPEUTIC ACTIVITIES: CPT

## 2022-10-15 PROCEDURE — 97116 GAIT TRAINING THERAPY: CPT

## 2022-10-15 PROCEDURE — 83735 ASSAY OF MAGNESIUM: CPT

## 2022-10-15 PROCEDURE — 96375 TX/PRO/DX INJ NEW DRUG ADDON: CPT

## 2022-10-15 PROCEDURE — 97161 PT EVAL LOW COMPLEX 20 MIN: CPT

## 2022-10-15 PROCEDURE — 74177 CT ABD & PELVIS W/CONTRAST: CPT | Mod: MA

## 2022-10-15 PROCEDURE — 99285 EMERGENCY DEPT VISIT HI MDM: CPT

## 2022-10-15 PROCEDURE — 71046 X-RAY EXAM CHEST 2 VIEWS: CPT

## 2022-10-15 PROCEDURE — 81001 URINALYSIS AUTO W/SCOPE: CPT

## 2022-10-15 PROCEDURE — 85014 HEMATOCRIT: CPT

## 2022-10-15 PROCEDURE — 84100 ASSAY OF PHOSPHORUS: CPT

## 2022-10-15 PROCEDURE — 71275 CT ANGIOGRAPHY CHEST: CPT | Mod: MA

## 2022-10-15 PROCEDURE — 82947 ASSAY GLUCOSE BLOOD QUANT: CPT

## 2022-10-15 PROCEDURE — 82330 ASSAY OF CALCIUM: CPT

## 2022-10-15 PROCEDURE — 82435 ASSAY OF BLOOD CHLORIDE: CPT

## 2022-10-15 PROCEDURE — 80053 COMPREHEN METABOLIC PANEL: CPT

## 2022-10-15 PROCEDURE — 86480 TB TEST CELL IMMUN MEASURE: CPT

## 2022-10-15 PROCEDURE — 83880 ASSAY OF NATRIURETIC PEPTIDE: CPT

## 2022-10-15 PROCEDURE — 71250 CT THORAX DX C-: CPT

## 2022-10-15 PROCEDURE — 84295 ASSAY OF SERUM SODIUM: CPT

## 2022-10-15 PROCEDURE — 0225U NFCT DS DNA&RNA 21 SARSCOV2: CPT

## 2022-10-15 PROCEDURE — 97110 THERAPEUTIC EXERCISES: CPT

## 2022-10-15 PROCEDURE — 85027 COMPLETE CBC AUTOMATED: CPT

## 2022-10-15 PROCEDURE — 87086 URINE CULTURE/COLONY COUNT: CPT

## 2022-10-15 PROCEDURE — U0005: CPT

## 2022-10-15 RX ADMIN — CHLORHEXIDINE GLUCONATE 1 APPLICATION(S): 213 SOLUTION TOPICAL at 09:26

## 2022-10-15 RX ADMIN — LOSARTAN POTASSIUM 50 MILLIGRAM(S): 100 TABLET, FILM COATED ORAL at 05:52

## 2022-10-15 RX ADMIN — CEFEPIME 100 MILLIGRAM(S): 1 INJECTION, POWDER, FOR SOLUTION INTRAMUSCULAR; INTRAVENOUS at 09:25

## 2022-10-15 RX ADMIN — Medication 50 MILLIGRAM(S): at 11:48

## 2022-10-15 RX ADMIN — Medication 650 MILLIGRAM(S): at 11:38

## 2022-10-15 RX ADMIN — CEFEPIME 100 MILLIGRAM(S): 1 INJECTION, POWDER, FOR SOLUTION INTRAMUSCULAR; INTRAVENOUS at 01:08

## 2022-10-15 RX ADMIN — Medication 650 MILLIGRAM(S): at 11:04

## 2022-10-15 RX ADMIN — ENOXAPARIN SODIUM 40 MILLIGRAM(S): 100 INJECTION SUBCUTANEOUS at 05:51

## 2022-10-15 RX ADMIN — Medication 325 MILLIGRAM(S): at 11:01

## 2022-10-15 RX ADMIN — Medication 60 MILLIGRAM(S): at 05:52

## 2022-10-15 NOTE — DISCHARGE NOTE NURSING/CASE MANAGEMENT/SOCIAL WORK - NSDCVIVACCINE_GEN_ALL_CORE_FT
Tdap; 27-Aug-2022 12:46; Angeli Barnard (SHENG); [employer]; [id number] (Exp. Date: 18-Jan-2024); IntraMuscular; Deltoid Left.; 0.5 milliLiter(s); VIS (VIS Published: 18-Jan-2024, VIS Presented: 27-Aug-2022);

## 2022-10-15 NOTE — PROGRESS NOTE ADULT - ASSESSMENT
ASSESSMENT:    61 year old gentlewoman, lifelong non-smoker, with history of obesity related obstructive sleep apnea (non-adherent with prescribed CPAP) and mild asthma on albuterol as needed. The patient underwent a right hip replacement revision in May. Due to non-healing, she underwent implantation of a proximal femur hook plate, cables and an allograft femur strut in September. She has been on cefepime for ~ 5 weeks due to pseudomonas found on tissue culture. She was discharged to rehab but returned to the ER yesterday with abdominal discomfort due to a hernia that has been reduced. She has no abdominal pain at present. The patient has been feeling unwell for ~ 1 week. She has fatigue, malaise and weakness. She has been having low grade fevers associated with nighttime fevers and drenching sweats. She has been short of breath with a cough especially when inspiring. She has anterior chest pain exacerbated by cough and breathing. She has no chest congestion or wheeze. In the ER, the patient was found to be hypoxic.     CT scan ->  no evidence of pulmonary embolism - extensive bilateral peripheral infiltrates may be seen in the setting of Covid pneumonia - no acute pathology in the abdomen and pelvis - small fat-containing ventral hernias are appreciated    the patient has hypoxemia in the setting of fatigue, malaise and weakness, low grade fevers associated with nighttime chills and drenching sweats, dyspnea/hypoxemia and a cough with pleuritic chest pain - CT scan has peripheral opacities in both the upper and lower lobes - it would be unusual for the patient to have developed bacterial pneumonia while on cefepime for a pseudomonal joint infection - suspect that the patient has cryptogenic organizing pneumonia rather than eosinophilic pneumonia which tends to be upper lobe predominant    10/10 - much improved except for ongoing nocturnal chills and sweats without fevers; no shortness of breath or hypoxemia on a 1lpm nasal canula; cough is much improved and without sputum production; no chest congestion or wheeze; no fatigue, malaise or weakness: good appetite; minimal anterior chest pain with inspiration;     thrombocytosis     leukocytosis more likely related to high dose steroids than infection    PLAN/RECOMMENDATIONS:    stable oxygenation on room air at rest and with ambulation  repeat CT - > scattered cindy of bilateral, peripheral opacities which are decreased, and some areas now exhibiting a perilobular pattern compatible with clinically suspected organizing pneumonia  prednisone 60mg daily -> taper by 10mg weekly -> has responded well to empiric steroids  continues on a long course of cefepime for pseudomonas in her right hip prothesis  has not received treatment for Enterococcus faecium in a urine culture  diligent DVT prophylaxis - SQ lovenox  topiramate for migraine headaches  physical therapy    Thank you for the courtesy of this referral. Plan of care discussed with the patient at bedside. Discharge planning. The patient will follow-up with Dr. Kirk Smith in our office after discharge from rehab - earlier if her night sweats do not resolve. She has my cell phone number to call with acute issues.    Chris Lomas MD, [facility]  [phone]  Pulmonary Medicine

## 2022-10-15 NOTE — DISCHARGE NOTE NURSING/CASE MANAGEMENT/SOCIAL WORK - NSDCPEFALRISK_GEN_ALL_CORE
For information on Fall & Injury Prevention, visit: [email or web address] OR  [email or web address] OR  https://www.cdc.gov/steadi/patient.html

## 2022-10-15 NOTE — PROGRESS NOTE ADULT - SUBJECTIVE AND OBJECTIVE BOX
PROGRESS  NOTE   ________________________________________________    CHIEF COMPLAINT:Patient is a 61y old  Female who presents with a chief complaint of abdominal wall hernia non-reducible, nausea (14 Oct 2022 17:33)  doing well.  	  REVIEW OF SYSTEMS:  CONSTITUTIONAL: No fever, weight loss, or fatigue  EYES: No eye pain, visual disturbances, or discharge  ENT:  No difficulty hearing, tinnitus, vertigo; No sinus or throat pain  NECK: No pain or stiffness  RESPIRATORY: No cough, wheezing, chills or hemoptysis; No Shortness of Breath  CARDIOVASCULAR: No chest pain, palpitations, passing out, dizziness, or leg swelling  GASTROINTESTINAL: No abdominal or epigastric pain. No nausea, vomiting, or hematemesis; No diarrhea or constipation. No melena or hematochezia.  GENITOURINARY: No dysuria, frequency, hematuria, or incontinence  NEUROLOGICAL: No headaches, memory loss, loss of strength, numbness, or tremors  SKIN: No itching, burning, rashes, or lesions   LYMPH Nodes: No enlarged glands  ENDOCRINE: No heat or cold intolerance; No hair loss  MUSCULOSKELETAL: No joint pain or swelling; No muscle, back, or extremity pain  PSYCHIATRIC: No depression, anxiety, mood swings, or difficulty sleeping  HEME/LYMPH: No easy bruising, or bleeding gums  ALLERGY AND IMMUNOLOGIC: No hives or eczema	    [ ] All others negative	  [ ] Unable to obtain    PHYSICAL EXAM:  T(C): 36.8 (10-15-22 @ 04:52), Max: 37.1 (10-14-22 @ 13:30)  HR: 70 (10-15-22 @ 04:52) (64 - 92)  BP: 133/80 (10-15-22 @ 04:52) (112/74 - 141/85)  RR: 18 (10-15-22 @ 04:52) (18 - 18)  SpO2: 95% (10-15-22 @ 04:52) (95% - 96%)  Wt(kg): --  I&O's Summary    14 Oct 2022 07:01  -  15 Oct 2022 07:00  --------------------------------------------------------  IN: 1340 mL / OUT: 0 mL / NET: 1340 mL        Appearance: Normal	  HEENT:   Normal oral mucosa, PERRL, EOMI	  Lymphatic: No lymphadenopathy  Cardiovascular: Normal S1 S2, No JVD, + murmurs, No edema  Respiratory: Lungs clear to auscultation	  Psychiatry: A & O x 3, Mood & affect appropriate  Gastrointestinal:  Soft, Non-tender, + BS	  Skin: No rashes, No ecchymoses, No cyanosis	  Neurologic: Non-focal  Extremities: Normal range of motion, No clubbing, cyanosis or edema  Vascular: Peripheral pulses palpable 2+ bilaterally    MEDICATIONS  (STANDING):  cefepime   IVPB 2000 milliGRAM(s) IV Intermittent every 8 hours  chlorhexidine 4% Liquid 1 Application(s) Topical <User Schedule>  enoxaparin Injectable 40 milliGRAM(s) SubCutaneous every 24 hours  ferrous    sulfate 325 milliGRAM(s) Oral daily  losartan 50 milliGRAM(s) Oral daily  predniSONE   Tablet   Oral   predniSONE   Tablet 60 milliGRAM(s) Oral daily  senna 2 Tablet(s) Oral at bedtime  topiramate 50 milliGRAM(s) Oral daily      TELEMETRY: 	    ECG:  	  RADIOLOGY:  OTHER: 	  	  LABS:	 	    CARDIAC MARKERS:                  proBNP: Serum Pro-Brain Natriuretic Peptide: 48 pg/mL (10-06 @ 17:18)    Lipid Profile:   HgA1c:   TSH:         Assessment and plan  ---------------------------  62 y/o female medical history sig for Asthma, obesity, JOSE LUIS not on NIV, Migraines, HTN who was accepted for transfer from [facility] by Dr. Byrnes for evaluation of previous right THR on 5/2/22. Patient was admitted to [facility] on 8/27 s/p fall and found to have fractured femoral prosthesis/ non union and may need bone graft. She denies any LOC or head trauma from fall, describes fall as leg giving out. She was seen by Cardiology prior to transfer and deemed stable for surgery. Denies any tori-operative issues in 5/22 during last revision.    cryptogenic organizing PNA- Continue Robitussin, Tessalon, Hycodan at bedtime. Continue SQ Lovenox. Continue Prednisone.   acute hypoxic respiratory failure due to  PNA-improved on 1L nasal cannula only. Continue wean her off on oxygen.   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 WBAT. PT/ OT. Ortho following . OR Cultures PRELIM rare Pseudomonas. ID consulted and Cefepime started antibiotics through 10/28/22 for total 8 weeks. Will need suppression thereafter.  HTN-  Losartan and hold HCTZ due to soft BP.  Migraine - Cont. Topamax  JOSE LUIS- Not on NIV HS. O/P pulm f/u  Asthma- PRN nebs  DVT Prophylaxis -- Lovenox SQ   Abdominal hernia - reduced, asymptomatic. No surgery indicated at this time. F/u with surgery as outpatient  after completion of abx and IV steroid.  continue abdominal binder.   continue with steroid taper  awaiting dc pcandace

## 2022-10-15 NOTE — PROGRESS NOTE ADULT - REASON FOR ADMISSION
abdominal wall hernia non-reducible, nausea

## 2022-10-15 NOTE — PROGRESS NOTE ADULT - SUBJECTIVE AND OBJECTIVE BOX
[facility] - PROGRESS NOTE    CHIEF COMPLAINT: hypoxemia; asthma; obesity -> obstructive sleep apnea; abnormal chest CT; cryptogenic organizing pneumonia;     INTERVAL HISTORY: discharge delayed awaiting results of a COVID swab; improved; repeat chest CT with improved bilateral peripheral consolidations; no night sweats overnight; no fevers or chills; no shortness of breath or hypoxemia on room air ambulating with a walker about the gonzalez; minimal cough without sputum production; no chest congestion or wheeze; no fatigue, malaise or weakness: good appetite; resolved anterior chest pain with inspiration; discharge on hold awaiting insurance approval for return to rehab following hip surgery    REVIEW OF SYSTEMS:  Constitutional: As per interval history  HEENT: Within normal limits  CV: As per interval history  Resp: As per interval history  GI: Within normal limits   : Within normal limits  Musculoskeletal: right femoral fracture and repair  Skin: Within normal limits  Neurological: Within normal limits  Psychiatric: Within normal limits  Endocrine: Within normal limits  Hematologic/Lymphatic: Within normal limits  Allergic/Immunologic: Within normal limits    MEDICATIONS:     Pulmonary "    Anti-microbials:  cefepime   IVPB 2000 milliGRAM(s) IV Intermittent every 8 hours    Cardiovascular:  losartan 50 milliGRAM(s) Oral daily    Other:  chlorhexidine 4% Liquid 1 Application(s) Topical <User Schedule>  enoxaparin Injectable 40 milliGRAM(s) SubCutaneous every 24 hours  ferrous    sulfate 325 milliGRAM(s) Oral daily  predniSONE   Tablet   Oral   predniSONE   Tablet 60 milliGRAM(s) Oral daily  senna 2 Tablet(s) Oral at bedtime  topiramate 50 milliGRAM(s) Oral daily    MEDICATIONS  (PRN):  acetaminophen     Tablet .. 650 milliGRAM(s) Oral every 6 hours PRN Temp greater or equal to 38C (100.4F), Moderate Pain (4 - 6)  polyethylene glycol 3350 17 Gram(s) Oral daily PRN Constipation  sodium chloride 0.9% lock flush 10 milliLiter(s) IV Push every 1 hour PRN Pre/post blood products, medications, blood draw, and to maintain line patency        OBJECTIVE:    PHYSICAL EXAM:       ICU Vital Signs Last 24 Hrs  T(C): 36.7 (15 Oct 2022 10:10), Max: 37.1 (14 Oct 2022 13:30)  T(F): 98 (15 Oct 2022 10:10), Max: 98.8 (14 Oct 2022 13:30)  HR: 88 (15 Oct 2022 10:10) (64 - 88)  BP: 117/74 (15 Oct 2022 10:10) (112/74 - 141/85)  BP(mean): --  ABP: --  ABP(mean): --  RR: 18 (15 Oct 2022 10:10) (18 - 18)  SpO2: 94% (15 Oct 2022 10:10) (94% - 96%) on room air     General: Awake. Alert. Cooperative. No distress. Appears stated age. Sitting in the chair.  HEENT: Atraumatic. Normocephalic. Anicteric. Normal oral mucosa. PERRL. EOMI. Mallampati class IV airway  Neck: Supple. Trachea midline. Thyroid without enlargement/tenderness/nodules. No carotid bruit. No JVD. Short and wide.  Cardiovascular: Regular rate and rhythm. S1 S2 normal. No murmurs, rubs or gallops.  Respiratory: Respirations unlabored. Decreased rales. No curvature.  Abdomen: Soft. Non-tender. Non-distended. No organomegaly. No masses. Normal bowel sounds.  Extremities: Warm to touch. No clubbing or cyanosis. No pedal edema. Decreased right hip range of motion.  Pulses: 2+ peripheral pulses all extremities.	  Skin: Healing surgical incision right hip.  Lymph Nodes: Cervical, supraclavicular and axillary nodes normal  Neurological: Motor and sensory examination equal and normal. A and O x 3  Psychiatry: Appropriate mood and affect.    LABS:      CBC    WBC  19.01 <==, 26.21 <==    Hemoglobin  9.5 <<==, 9.3 <<==    Hematocrit  30.2 <==, 29.1 <==    Platelets  771 <==, 698 <==      LYTES    sodium  138 <==, 137 <==    potassium   4.6 <==, 4.9 <==    chloride  105 <==, 105 <==    carbon dioxide  21 <==, 21 <==    =============================================================================================  RENAL FUNCTION:    Creatinine:   0.62  <<==, 0.52  <<==    BUN:   27 <==, 25 <==    ============================================================================================    calcium   9.7 <==, 9.7 <==    phos   3.8 <==    mag   2.3 <==    ============================================================================================  LFTs    AST:   7 <==     ALT:  15  <==     AP:  81  <=    Bili:  0.1  <=    Serum Pro-Brain Natriuretic Peptide: 48 pg/mL (10-06 @ 17:18)    CARDIAC MARKERS ( 06 Oct 2022 17:18 )  CPK x     /CKMB x     /CKMB Units x        troponin 25 ng/L    MICROBIOLOGY:     Respiratory Viral Panel with COVID-19 by TARA (10.06.22 @ 17:24)   Rapid RVP Result: [facility]   SARS-CoV-2: [facility]  This Respiratory Panel uses polymerase chain reaction (PCR) to detect for adenovirus; coronavirus (HKU1, NL63, 229E, OC43); human metapneumovirus (hMPV); human enterovirus/rhinovirus (Entero/RV); influenza A; influenza A/H1; influenza A/H3; influenza A/H1-2009; influenza B; parainfluenza viruses 1, 2, 3, 4; respiratory syncytial virus; Mycoplasma pneumoniae; Chlamydophila pneumoniae; and SARS-CoV-2.     Legionella pneumophila Antigen, Urine (10.07.22 @ 19:52)   Legionella Antigen, Urine: Negative:    Culture - Blood (10.07.22 @ 00:10)   Specimen Source: .Blood Blood-Peripheral   Culture Results:   No growth to date.     Culture - Blood (10.06.22 @ 23:40)   Specimen Source: .Blood Blood-Peripheral   Culture Results:   No growth to date.     Urinalysis Basic - ( 06 Oct 2022 18:03 )    Color: Light Yellow / Appearance: Clear / S.010 / pH: x  Gluc: x / Ketone: Negative  / Bili: Negative / Urobili: Negative   Blood: x / Protein: Trace / Nitrite: Negative   Leuk Esterase: Negative / RBC: 1 /hpf / WBC 3 /HPF   Sq Epi: x / Non Sq Epi: 6 /hpf / Bacteria: Negative    Culture - Urine (10.06.22 @ 18:03)   - Nitrofurantoin: I 64 Should not be used to treat pyelonephritis.   - Tetra/Doxy: R >8   - Ciprofloxacin: R >2   - Levofloxacin: R >4   - Vancomycin: R >16   - Ampicillin: R >8 Predicts results to ampicillin/sulbactam, amoxacillin-clavulanate and piperacillin-tazobactam.   Specimen Source: Clean Catch Clean Catch (Midstream)   Culture Results:   10,000 - 49,000 CFU/mL Enterococcus faecium   Organism Identification: Enterococcus faecium     Culture - Tissue with Gram Stain (22 @ 08:59)   - Piperacillin/Tazobactam: S <=8   - Tobramycin: S <=2   Gram Stain:   No polymorphonuclear leukocytes per low power field   No organisms seen per oil power field   - Amikacin: S <=16   - Aztreonam: S <=4   - Cefepime: S <=2   - Ceftazidime: S <=1   - Ciprofloxacin: S <=0.25   - Gentamicin: S <=2   - Imipenem: S <=1   - Levofloxacin: S <=0.5   - Meropenem: S <=1     Specimen Source: .Tissue Right hip Bone Culture   Culture Results:   Rare Pseudomonas aeruginosa   Organism Identification: Pseudomonas aeruginosa   Organism: Pseudomonas aeruginosa   Method Type: PB     RADIOLOGY:  [x ] Chest radiographs reviewed and interpreted by me    EXAM:  CT CHEST                          PROCEDURE DATE:  10/13/2022      FINDINGS:    Lungs/Airways/Pleura: There are scattered cindy of bilateral, peripheral   opacities which are decreased, and some areas now exhibiting a   perilobular pattern. The airways are patent. No pleural effusions.    Mediastinum/Lymph nodes: No thoracic adenopathy.    Heart and Vessels: The great vessels are normal in size. Left PICC with   tip in the  SVC. The heart is normal in size. Coronary calcifications. No   pericardial effusion.    Upper Abdomen: Unremarkable.    Osseous structures and Soft Tissues: Mild degenerative changes of the   thoracic spine.    IMPRESSION:    Decreased peripheral lung disease compatible with clinically suspected   organizing pneumonia.     SOWMYA CORREA MD; Resident Radiologist  This document has been electronically signed.  MONA PASCAL M.D., ATTENDING RADIOGIST  This document has been electronically signed. Oct 13 2022 11:51AM  ---------------------------------------------------------------------------------------------------------------  EXAM:  XR CHEST PA LAT 2V                          PROCEDURE DATE:  10/06/2022      FINDINGS:  Scattered bilateral opacities may represent multifocal pneumonia in the appropriate clinical setting. Small left pleural effusion with associated basilar atelectasis.  The heart is normal in size.  There is no pneumothorax.    IMPRESSION:  Scattered bilateral opacities may represent multifocal pneumonia in the appropriate clinical setting. Small left pleural effusion with associated   basilar atelectasis.    ELLYN JOSHI MD; Resident Radiologist  This document has been electronically signed.  SOFI ZHENG MD; Attending Radiologist  This document has been electronically signed. Oct  7 2022  8:30AM  ---------------------------------------------------------------------------------------------------------------  EXAM:  CT ABDOMEN AND PELVIS IC                        EXAM:  CT ANGIO CHEST PULM [facility]                          PROCEDURE DATE:  10/06/2022      FINDINGS:  CHEST:  LUNGS AND LARGE AIRWAYS: Patent central airways. Multiple bilateral peripheral infiltrates suggestive of Covid pneumonia. Please correlate with Covid status.  PLEURA: No pleural effusion.  VESSELS: Left-sided PICC line is seen with the tip in the superior vena cava  HEART: Heart size is normal. No pericardial effusion.  MEDIASTINUM AND DANA: No lymphadenopathy.  CHEST WALL AND LOWER NECK: Within normal limits.    ABDOMEN AND PELVIS:  LIVER: Within normal limits.  BILE DUCTS: Normal caliber.  GALLBLADDER: Within normal limits.  SPLEEN: Within normal limits.  PANCREAS: Within normal limits.  ADRENALS: Within normal limits.  KIDNEYS/URETERS: Within normal limits.    BLADDER: Within normal limits.  REPRODUCTIVE ORGANS: Uterus and adnexa within normal limits.    BOWEL: No bowel obstruction. Appendix is not visualized. No evidence of   inflammation in the pericecal region.  PERITONEUM: No ascites.  VESSELS: Atherosclerotic changes.  RETROPERITONEUM/LYMPH NODES: No lymphadenopathy.  ABDOMINAL WALL: Supraumbilical fat-containing hernia to left of midline.   Small fat-containing umbilical hernia also seen.  BONES: Degenerative changes.    IMPRESSION: No evidence of pulmonary embolism.  Extensive bilateral peripheral infiltrates may be seen in the setting of Covid pneumonia. Please correlate with Covid testing.  No acute pathology in the abdomen and pelvis small fat-containing ventral hernias are appreciated    HEAVEN PADGETT MD; Attending Radiologist  This document has been electronically signed. Oct  6 2022  6:52PM  ---------------------------------------------------------------------------------------------------------------

## 2022-10-15 NOTE — DISCHARGE NOTE NURSING/CASE MANAGEMENT/SOCIAL WORK - PATIENT PORTAL LINK FT
You can access the FollowMyHealth Patient Portal offered by [facility] by registering at the following website: [email or web address]. By joining [facility]’s FollowMyHealth portal, you will also be able to view your health information using other applications (apps) compatible with our system.

## 2022-10-15 NOTE — PROGRESS NOTE ADULT - PROVIDER SPECIALTY LIST ADULT
Internal Medicine
Pulmonology
Pulmonology
Internal Medicine
Internal Medicine
Pulmonology
Internal Medicine
Pulmonology
Pulmonology
Internal Medicine
Internal Medicine

## 2022-11-01 ENCOUNTER — APPOINTMENT (OUTPATIENT)
Dept: INTERNAL MEDICINE | Facility: CLINIC | Age: 62
End: 2022-11-01

## 2022-11-01 VITALS
OXYGEN SATURATION: 96 % | BODY MASS INDEX: 32.65 KG/M2 | HEIGHT: 65 IN | HEART RATE: 83 BPM | WEIGHT: 196 LBS | TEMPERATURE: 97.2 F | SYSTOLIC BLOOD PRESSURE: 127 MMHG | DIASTOLIC BLOOD PRESSURE: 89 MMHG

## 2022-11-01 PROCEDURE — 99214 OFFICE O/P EST MOD 30 MIN: CPT | Mod: 25

## 2022-11-01 PROCEDURE — 36415 COLL VENOUS BLD VENIPUNCTURE: CPT

## 2022-11-01 RX ORDER — OMEPRAZOLE 40 MG/1
40 CAPSULE, DELAYED RELEASE ORAL
Qty: 30 | Refills: 0 | Status: COMPLETED | COMMUNITY
Start: 2022-10-28

## 2022-11-01 RX ORDER — PREDNISONE 50 MG/1
50 TABLET ORAL
Qty: 7 | Refills: 0 | Status: COMPLETED | COMMUNITY
Start: 2022-10-17

## 2022-11-01 RX ORDER — PREDNISONE 20 MG/1
20 TABLET ORAL
Qty: 16 | Refills: 0 | Status: ACTIVE | COMMUNITY
Start: 2022-10-24

## 2022-11-01 RX ORDER — ENOXAPARIN SODIUM 40 MG/.4ML
40 INJECTION, SOLUTION SUBCUTANEOUS
Qty: 4 | Refills: 0 | Status: COMPLETED | COMMUNITY
Start: 2022-10-15

## 2022-11-01 RX ORDER — CEFEPIME HYDROCHLORIDE 2 G/1
2 INJECTION, POWDER, FOR SOLUTION INTRAMUSCULAR; INTRAVENOUS
Qty: 12 | Refills: 0 | Status: COMPLETED | COMMUNITY
Start: 2022-10-24

## 2022-11-01 RX ORDER — LEVOFLOXACIN 500 MG/1
500 TABLET, FILM COATED ORAL DAILY
Qty: 30 | Refills: 2 | Status: ACTIVE | COMMUNITY
Start: 2022-10-27 | End: 1900-01-01

## 2022-11-01 RX ORDER — PREDNISONE 10 MG/1
10 TABLET ORAL
Qty: 30 | Refills: 0 | Status: ACTIVE | COMMUNITY
Start: 2022-10-15

## 2022-11-01 NOTE — HISTORY OF PRESENT ILLNESS
[FreeTextEntry1] : 61-year-old female with h/o Asthma, obesity, JOSE LUIS not on NIV, Migraines, HTN. Patient is s/p right THR (2017 at [facility] ) then found to have a fractured femoral prosthesis, revision performed through an extended trochanteric osteotomy 5/2/22. Patient had presented to [facility] s/p fall found to have a possible extended trochanteric osteotomy non union and was transferred to [facility] for evaluation of previous right THR on 5/2/22. She was admitted to [facility] on August 30, 2022 and discharged on September 6, 2022. During the hospitalization patient is s/p RT GT open reduction internal fixation 9/1/22. OR cultures Pseudomonas aeruginosa. Patient initially was on cefazolin which was switched to cefepime 2 g every 8 hours. Patient had a PICC line placed and was discharged on cefepime 2 g IV every 8 hours to be completed on October 28, 2020 8 weeks of IV antibiotic therapy.  Patient completed course of IV antibiotic therapy for 8 weeks.  Started on Levaquin 500 mg daily since completion of IV antibiotic therapy.  She reports no fever no chills.  No diarrhea.

## 2022-11-01 NOTE — PHYSICAL EXAM
[General Appearance - Alert] : alert [General Appearance - In No Acute Distress] : in no acute distress [General Appearance - Well-Appearing] : healthy appearing [Sclera] : the sclera and conjunctiva were normal [PERRL With Normal Accommodation] : pupils were equal in size, round, reactive to light [Extraocular Movements] : extraocular movements were intact [Outer Ear] : the ears and nose were normal in appearance [Neck Appearance] : the appearance of the neck was normal [Exaggerated Use Of Accessory Muscles For Inspiration] : no accessory muscle use [FreeTextEntry1] : Right hip incision site intact.  Full ROM [Skin Color & Pigmentation] : normal skin color and pigmentation [] : no rash [Motor Exam] : the motor exam was normal [No Focal Deficits] : no focal deficits [Oriented To Time, Place, And Person] : oriented to person, place, and time [Affect] : the affect was normal

## 2022-11-01 NOTE — ASSESSMENT
[FreeTextEntry1] : 61-year-old female with a complicated orthopedic course currently on oral antibiotics for suppression of Pseudomonas aeruginosa infection in the presence of hardware\par \par Pseudomonas aeruginosa infection\par Infection of prosthetic right hip\par Hardware present\par \par Recommendations:\par Patient completed a course of IV cefepime for 8 weeks for her infection of right prosthetic hip with Pseudomonas aeruginosa.\par Will continue her oral suppression with Levaquin 500 mg daily\par Her QTC is not prolonged seen on EKG done in October 2022 at [facility].\par No abdominal aneurysm seen on CT scan of the abdomen pelvis also done this year.\par Will monitor for tendinitis.\par Discussed with patient about other oral options which are not available.  If infection were to recur may need further surgical intervention.\par \par Follow-up in 6 weeks\par \par Counseling included lab results, differential diagnosis, treatment options, risks and benefits, lifestyle changes, current condition, medications and dose adjustments.\par The patient was interactive attentive and asked questions and verbalized understanding.\par

## 2022-11-02 ENCOUNTER — NON-APPOINTMENT (OUTPATIENT)
Age: 62
End: 2022-11-02

## 2022-11-02 LAB
ALBUMIN SERPL ELPH-MCNC: 4.6 G/DL
ALP BLD-CCNC: 76 U/L
ALT SERPL-CCNC: 32 U/L
ANION GAP SERPL CALC-SCNC: 14 MMOL/L
AST SERPL-CCNC: 18 U/L
BILIRUB SERPL-MCNC: 0.2 MG/DL
BUN SERPL-MCNC: 29 MG/DL
CALCIUM SERPL-MCNC: 10 MG/DL
CHLORIDE SERPL-SCNC: 106 MMOL/L
CO2 SERPL-SCNC: 21 MMOL/L
CREAT SERPL-MCNC: 0.63 MG/DL
CRP SERPL-MCNC: <3 MG/L
EGFR: 100 ML/MIN/1.73M2
ERYTHROCYTE [SEDIMENTATION RATE] IN BLOOD BY WESTERGREN METHOD: 19 MM/HR
GLUCOSE SERPL-MCNC: 114 MG/DL
POTASSIUM SERPL-SCNC: 4.8 MMOL/L
PROT SERPL-MCNC: 6.8 G/DL
SODIUM SERPL-SCNC: 140 MMOL/L

## 2022-11-09 ENCOUNTER — APPOINTMENT (OUTPATIENT)
Dept: ORTHOPEDIC SURGERY | Facility: CLINIC | Age: 62
End: 2022-11-09

## 2022-11-09 DIAGNOSIS — A49.8 OTHER BACTERIAL INFECTIONS OF UNSPECIFIED SITE: ICD-10-CM

## 2022-11-09 PROCEDURE — 73552 X-RAY EXAM OF FEMUR 2/>: CPT | Mod: RT

## 2022-11-09 PROCEDURE — 99024 POSTOP FOLLOW-UP VISIT: CPT

## 2022-11-09 PROCEDURE — 72170 X-RAY EXAM OF PELVIS: CPT

## 2022-11-09 NOTE — HISTORY OF PRESENT ILLNESS
[___ Weeks Post Op] : [unfilled] weeks post op [3] : the patient reports pain that is 3/10 in severity [Chills] : no chills [Constipation] : no constipation [Diarrhea] : no diarrhea [Dysuria] : no dysuria [Fever] : no fever [Nausea] : no nausea [Vomiting] : no vomiting [Clean/Dry/Intact] : clean, dry and intact [Healed] : healed [Erythema] : not erythematous [Discharge] : absent of discharge [Swelling] : swollen [Hardware in Good Position] : hardware in good position [Good Overall Alignment] : good overall alignment [Doing Well] : is doing well [No Sign of Infection] : is showing no signs of infection [Adequate Pain Control] : has adequate pain control [None] : None [de-identified] : \par DOS: 09/01/2022\par s/p repair of right femur non union with allograft. [de-identified] : 2 views right femur and AP pelvis [de-identified] : No ortho trauma restrictions. Defer to Arthroplasty and ID for further evaluation.

## 2022-11-29 ENCOUNTER — APPOINTMENT (OUTPATIENT)
Dept: INTERNAL MEDICINE | Facility: CLINIC | Age: 62
End: 2022-11-29

## 2022-12-02 ENCOUNTER — NON-APPOINTMENT (OUTPATIENT)
Age: 62
End: 2022-12-02

## 2022-12-13 ENCOUNTER — APPOINTMENT (OUTPATIENT)
Dept: INTERNAL MEDICINE | Facility: CLINIC | Age: 62
End: 2022-12-13

## 2022-12-21 NOTE — PHYSICAL THERAPY INITIAL EVALUATION ADULT - CRITERIA FOR SKILLED THERAPEUTIC INTERVENTIONS
If you are a smoker, it is important for your health to stop smoking. Please be aware that second hand smoke is also harmful.
BARRETT/impairments found/anticipated discharge recommendation

## 2022-12-22 ENCOUNTER — APPOINTMENT (OUTPATIENT)
Dept: ORTHOPEDIC SURGERY | Facility: CLINIC | Age: 62
End: 2022-12-22

## 2022-12-22 VITALS
BODY MASS INDEX: 32.65 KG/M2 | DIASTOLIC BLOOD PRESSURE: 95 MMHG | HEART RATE: 114 BPM | HEIGHT: 65 IN | WEIGHT: 196 LBS | SYSTOLIC BLOOD PRESSURE: 144 MMHG

## 2022-12-22 PROCEDURE — 99213 OFFICE O/P EST LOW 20 MIN: CPT

## 2022-12-22 PROCEDURE — 73502 X-RAY EXAM HIP UNI 2-3 VIEWS: CPT

## 2022-12-22 NOTE — REASON FOR VISIT
[Follow-Up Visit] : a follow-up visit for [Other: ____] : [unfilled] [FreeTextEntry2] : s/p Revision right total hip arthroplasty, posterior approach DOS :05/02/22. \par \par

## 2022-12-22 NOTE — HISTORY OF PRESENT ILLNESS
[de-identified] : Ms. SUSY CHOUDHURY is a 62 year old female status post revision right THR on 5/2/22. Patient presents for follow up evaluation with some right groin pain and lateral hip pain but overall feels "good".  Compared to before the nonunion surgery she feels much better.  The groin pain is intermittent with deep ROM. The lateral hip pain is worse with direct pressure such as laying on that side. Patient states she recently had a falling out with her ID doctor. She was on levaquin daily but had to stop due to joint pain.  Due to the lack of alternative oral suppressive antibiotics for her organism, there was no other option for oral suppression.  Since stopping the antibiotic she does not report any fevers and no increase in pain around the hip.  She denies any issues with regard to the incision.  She would like to start outpatient physical therapy.

## 2022-12-22 NOTE — PHYSICAL EXAM
[de-identified] : The patient appears well nourished  and in no apparent distress.  The patient is alert and oriented to person, place, and time.   Affect and mood appear normal. The head is normocephalic and atraumatic.  The eyes reveal normal sclera and extra ocular muscles are intact. The tongue is midline with no apparent lesions.  Skin shows normal turgor with no evidence of eczema or psoriasis.  No respiratory distress noted.  Sensation grossly intact.		  [de-identified] : Exam of the right hip shows a well healed incision, hip flexion of 90 degrees, hip external rotation of 35 degrees both without pain.exam of the incision shows that it is healed well without any drainage.  It is nontender.  5/5 motor strength bilaterally distally. Sensation intact distally.		  [de-identified] :  X-ray: AP of the pelvis and 2 views of the right hip demonstrate a right total hip arthroplasty in stable position, with no evidence of fracture, loosening, or dislocation.  Status post open reduction internal fixation right greater trochanter with long trochanteric plate.  Greater trochanter appears healed.

## 2022-12-22 NOTE — ADDENDUM
[FreeTextEntry1] : This note was authored by Ruperto Merino working as a medical scribe for Dr. Johan Byrnes. The note was reviewed, edited, and revised by Dr. Johan Byrnes whom is in agreement with the exam findings, imaging findings, and treatment plan. 12/22/2022

## 2022-12-22 NOTE — DISCUSSION/SUMMARY
[de-identified] : Patient 62-year-old female who underwent revision right total hip replacement in May 2022 followed by open reduction internal fixation of the right greater trochanter osteotomy nonunion 4 months ago.  During that surgery cultures near the nonunion were shown to grow Pseudomonas.  The patient was placed on IV antibiotics and then oral suppression with Levaquin.  She developed significant joint pain across multiple joints while on Levaquin so this was stopped 2 weeks ago.  She has not experienced any increasing pain in the hip or fever since stopping it and her joint pain feels much better.  She has an appointment with another infectious disease doctor for a second opinion for any other suppressive agents.  She was advised to monitor the incision as well as her pain level overall and as long as she continues to feel good that would be a sign that there is no recurrent infection.  She is going to start outpatient physical therapy.  She is weightbearing as tolerated.  She has follow-up with Dr. De Jesus in February and then she may see me 1 year postop in May for ongoing care.

## 2023-01-24 ENCOUNTER — NON-APPOINTMENT (OUTPATIENT)
Age: 63
End: 2023-01-24

## 2023-02-15 NOTE — OCCUPATIONAL THERAPY INITIAL EVALUATION ADULT - ORIENTATION, REHAB EVAL
Please schedule a colonoscopy/EUS (forward viewing)  for an ascending colon large submucosal lesion. 45 minutes. Referring: Edy Mares MD.   Thanks,   Lj Smith MD  
oriented to person, place, time and situation

## 2023-03-01 ENCOUNTER — APPOINTMENT (OUTPATIENT)
Dept: ORTHOPEDIC SURGERY | Facility: CLINIC | Age: 63
End: 2023-03-01
Payer: COMMERCIAL

## 2023-03-01 VITALS — WEIGHT: 196 LBS | BODY MASS INDEX: 32.65 KG/M2 | HEIGHT: 65 IN

## 2023-03-01 DIAGNOSIS — T84.010A BROKEN INTERNAL RIGHT HIP PROSTHESIS, INITIAL ENCOUNTER: ICD-10-CM

## 2023-03-01 DIAGNOSIS — Z96.649 INFECTION AND INFLAMMATORY REACTION DUE TO OTHER INTERNAL JOINT PROSTHESIS, SUBSEQUENT ENCOUNTER: ICD-10-CM

## 2023-03-01 DIAGNOSIS — T84.59XD INFECTION AND INFLAMMATORY REACTION DUE TO OTHER INTERNAL JOINT PROSTHESIS, SUBSEQUENT ENCOUNTER: ICD-10-CM

## 2023-03-01 PROCEDURE — 99214 OFFICE O/P EST MOD 30 MIN: CPT

## 2023-03-01 PROCEDURE — 73552 X-RAY EXAM OF FEMUR 2/>: CPT | Mod: RT

## 2023-03-01 NOTE — PHYSICAL EXAM
[de-identified] : Physical Exam:\par General: Well appearing, no acute distress, A&O\par Neurologic: A&Ox3, No focal deficits\par Head: NCAT without abrasions, lacerations, or ecchymosis to head, face, or scalp\par Respiratory: Equal chest wall expansion bilaterally, no accessory muscle use\par Lymphatic: No lymphadenopathy palpated\par Skin: Warm and dry\par Psychiatric: Normal mood and affect\par \par RLE\par SILT s/s/sp/dp/t\par Fires EHL/FHL/GS/TA\par 2+ DP/PT pulse\par brisk capillary refill [de-identified] : 4 views of the right femur obtained today show a revision total hip arthroplasty bridged by a long lateral hook plate and cables.  No sign of implant loosening is noted.

## 2023-03-01 NOTE — DISCUSSION/SUMMARY
[de-identified] : 62-year-old female with right revision total hip arthroplasty, femur nonunion status postrepair.  We discussed that the nonunion appears to be healing well and the implants appear stable at this time.  The primary issue is the concern for ongoing infection.  We will defer to infectious disease decisions about antibiotic use but if antibiotics are stopped, there is always a concern that the infection could recur.  Orthopedic trauma will be available to assist as needed for either long-term management or implant removal.  If she is able to be on a chronic suppressive medication, and she continues to improve, follow-up as needed.\par \par The patient was given the opportunity to ask questions and all questions were answered to their satisfaction.\par \par Jose Chang MD\par Orthopaedic Trauma Surgeon\par [facility]\par [facility] [place]\par Director Orthopaedic Trauma, [facility]\par \par \par \par

## 2023-03-01 NOTE — HISTORY OF PRESENT ILLNESS
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 DOS: 09/01/2022.  She was diagnosed with a Pseudomonas infection at the time of surgery.  She has been on various antibiotics since surgery.  She is having issues with joint pain from chronic quinolone use.  She has an upcoming appoint with infectious disease to discuss alternatives.  She is ambulating with a cane.  The patient states the pain is made worse with activity and relieved with rest.  Aching, 4 out of 10\par  [Bending] : worsened by bending [Lifting] : worsened by lifting [Recumbency] : relieved by recumbency [Rest] : relieved by rest

## 2023-03-01 NOTE — REVIEW OF SYSTEMS
[Joint Pain] : joint pain [Joint Stiffness] : joint stiffness [Joint Swelling] : joint swelling [Negative] : Heme/Lymph [FreeTextEntry9] : right femur pain

## 2023-03-29 DIAGNOSIS — M25.561 PAIN IN RIGHT KNEE: ICD-10-CM

## 2023-03-29 DIAGNOSIS — M25.552 PAIN IN LEFT HIP: ICD-10-CM

## 2023-03-29 DIAGNOSIS — M25.562 PAIN IN RIGHT KNEE: ICD-10-CM

## 2023-04-14 ENCOUNTER — APPOINTMENT (OUTPATIENT)
Dept: ORTHOPEDIC SURGERY | Facility: CLINIC | Age: 63
End: 2023-04-14
Payer: COMMERCIAL

## 2023-04-14 VITALS
BODY MASS INDEX: 32.65 KG/M2 | SYSTOLIC BLOOD PRESSURE: 174 MMHG | HEIGHT: 65 IN | DIASTOLIC BLOOD PRESSURE: 118 MMHG | WEIGHT: 196 LBS

## 2023-04-14 DIAGNOSIS — M16.12 UNILATERAL PRIMARY OSTEOARTHRITIS, LEFT HIP: ICD-10-CM

## 2023-04-14 PROCEDURE — 73502 X-RAY EXAM HIP UNI 2-3 VIEWS: CPT

## 2023-04-14 PROCEDURE — 99214 OFFICE O/P EST MOD 30 MIN: CPT

## 2023-04-14 NOTE — ADDENDUM
[FreeTextEntry1] : This note was authored by Ruperto Merino working as a medical scribe for Dr. Johan Byrnes. The note was reviewed, edited, and revised by Dr. Johan Byrnes whom is in agreement with the exam findings, imaging findings, and treatment plan. 04/14/2023

## 2023-04-14 NOTE — REASON FOR VISIT
[Follow-Up Visit] : a follow-up visit for [Other: ____] : [unfilled] [FreeTextEntry2] : s/p Revision right total hip arthroplasty, posterior approach DOS :05/02/22. \par

## 2023-04-14 NOTE — PHYSICAL EXAM
[de-identified] : The patient appears well nourished  and in no apparent distress.  The patient is alert and oriented to person, place, and time.   Affect and mood appear normal. The head is normocephalic and atraumatic.  The eyes reveal normal sclera and extra ocular muscles are intact. The tongue is midline with no apparent lesions.  Skin shows normal turgor with no evidence of eczema or psoriasis.  No respiratory distress noted.  Sensation grossly intact.		  [de-identified] : Exam of the right hip shows a well healed incision without warmth or erythema, hip flexion of 90 degrees, hip external rotation of 30 degrees, hip internal rotation of 10 degrees. Patient can perform a straight leg raise.		 \par Exam of the left hip shows pain with range of motion. The patient walks with a mild Trendelenburg gait on the right side. She can walk without assistive devices and only uses the cane for extended ambulation\par 5/5 motor strength bilaterally distally. Sensation intact distally.	  [de-identified] :  X-ray: AP of the pelvis and 2 views of the right hip demonstrate a right total hip arthroplasty in stable position, with no evidence of fracture, loosening, or dislocation. She has a healed right trochanteric osteotomy with hardware intact and no evidence of loosening. AP pelvis view shows moderate left hip joint space narrowing.\par

## 2023-04-14 NOTE — DISCUSSION/SUMMARY
[de-identified] : SUSY CHOUDHURY is a 62 year old female who presents with left hip moderate arthritis and right hip pain s/p revision right THR and ORIF trochanteric osteotomy nonunion with pseudomonas infection. For her left hip arthritis, the patient was recommended to undergo a left hip intra-articular cortisone injection under imaging guidance for therapeutic purposes. The patient will follow up 3 weeks post injection. As for her right THR, on imaging her implants appear stable and well fixed without signs of loosening or fracture. On exam there were no signs of infection. While her right hip pain requires her to walk with a cane for extended ambulation, there is no indication for further surgery at this time and all her hardware appears stable.  Clinically there is no sign of recurrent or persistent infection at this time.  We discussed in detail that if she experiences any increase in pain or fevers, chills, warmth of her incision or erythema that she should contact us and we will pursue further evaluation with an MRI, inflammatory markers and possible aspiration.  As of now I think the infection is not clinically active and the trochanteric osteotomy has healed.  As long as she continues to slowly progress I would like to see her back in September which will be 1 year since her last surgery.

## 2023-04-28 ENCOUNTER — OUTPATIENT (OUTPATIENT)
Dept: OUTPATIENT SERVICES | Facility: HOSPITAL | Age: 63
LOS: 1 days | End: 2023-04-28
Payer: COMMERCIAL

## 2023-04-28 ENCOUNTER — APPOINTMENT (OUTPATIENT)
Dept: ULTRASOUND IMAGING | Facility: CLINIC | Age: 63
End: 2023-04-28
Payer: COMMERCIAL

## 2023-04-28 DIAGNOSIS — Z96.641 PRESENCE OF RIGHT ARTIFICIAL HIP JOINT: Chronic | ICD-10-CM

## 2023-04-28 DIAGNOSIS — Z98.890 OTHER SPECIFIED POSTPROCEDURAL STATES: Chronic | ICD-10-CM

## 2023-04-28 DIAGNOSIS — Z98.891 HISTORY OF UTERINE SCAR FROM PREVIOUS SURGERY: Chronic | ICD-10-CM

## 2023-04-28 DIAGNOSIS — Z96.641 PRESENCE OF RIGHT ARTIFICIAL HIP JOINT: ICD-10-CM

## 2023-04-28 DIAGNOSIS — M16.12 UNILATERAL PRIMARY OSTEOARTHRITIS, LEFT HIP: ICD-10-CM

## 2023-04-28 PROCEDURE — 76942 ECHO GUIDE FOR BIOPSY: CPT | Mod: 26

## 2023-04-28 PROCEDURE — 76942 ECHO GUIDE FOR BIOPSY: CPT

## 2023-07-30 NOTE — ED PROVIDER NOTE - OBJECTIVE STATEMENT
62 yo F PMH of HTN, JOSE LUIS (not on CPAP), mild asthma, OA s/p right THR in 2017 at hospitals and Total revision of hip replacement (Broken internal right hip prosthesis) 02-May-2022 by Genaro in [facility] presents to ED with right upper leg pain s/p mechanical fall last night. Reports N/A 60 yo F PMH of HTN, JOSE LUIS (not on CPAP), mild asthma, OA s/p right THR in 2017 at [facility] and Total revision of hip replacement (Broken internal right hip prosthesis) 02-May-2022 by Genaro in [facility] presents to ED with right upper leg pain s/p mechanical fall last night. Reports she fell last night after her right leg gave out and she was escorted to her bed by police officers. States it happened too fast and unable to recall LOC or head injury. She noticed severe pain to right thigh and mild nausea and can't walk today. She also felt right mid back pain. Denies headache, dizziness, or visual changes. Denies neck pain or weakness to extremities. Denies CP/SOB/ABD pain or V/D. Denies fever, chills, cough or congestion. Unknown last TD. 60 yo F PMH of HTN, JOSE LUIS (not on CPAP), mild asthma, OA s/p right THR in 2017 at [facility] and Total revision of hip replacement (Broken internal right hip prosthesis) 02-May-2022 by Soniya in [facility] presents to ED with right upper leg pain s/p mechanical fall last night. Reports she fell last night after her right leg gave out and she was escorted to her bed by police officers. States it happened too fast and unable to recall LOC or head injury. She noticed severe pain to right thigh and mild nausea and can't walk today. She also felt right mid back pain. Denies headache, dizziness, or visual changes. Denies neck pain or weakness to extremities. Denies CP/SOB/ABD pain or V/D. Denies fever, chills, cough or congestion. Unknown last TD.

## 2023-09-02 ENCOUNTER — NON-APPOINTMENT (OUTPATIENT)
Age: 63
End: 2023-09-02

## 2023-09-05 ENCOUNTER — APPOINTMENT (OUTPATIENT)
Dept: ORTHOPEDIC SURGERY | Facility: CLINIC | Age: 63
End: 2023-09-05
Payer: COMMERCIAL

## 2023-09-05 VITALS
SYSTOLIC BLOOD PRESSURE: 160 MMHG | BODY MASS INDEX: 32.65 KG/M2 | DIASTOLIC BLOOD PRESSURE: 101 MMHG | HEIGHT: 65 IN | WEIGHT: 196 LBS

## 2023-09-05 DIAGNOSIS — Z96.641 PRESENCE OF RIGHT ARTIFICIAL HIP JOINT: ICD-10-CM

## 2023-09-05 PROCEDURE — 99214 OFFICE O/P EST MOD 30 MIN: CPT

## 2023-09-05 NOTE — DISCUSSION/SUMMARY
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 She underwent bone grafting and revision surgery for this. She is 1 year s/p her last surgery and fell recently. On imaging the patient's implants appear stable and well-fixed without signs of loosening or fracture. There are no signs of recurrent infection. The patient will continue their home exercises. Overall the patient is very happy with the outcome of the surgery.  Dental prophylaxis was reviewed. Follow up in 3-5 years for radiographic surveillance. She is reassured to hear no clinical issues were found regarding her right hip at this time. She was referred to pain management for her back pain.

## 2023-09-05 NOTE — HISTORY OF PRESENT ILLNESS
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 She underwent bone grafting and revision surgery for this. She is 1 year s/p her last surgery and she is concerned she may have damaged her implants following a recent fall. The patient reports continuing a home exercise routine. She is 20 weeks off antibiotics and is able to go to the gym and be as active as she desires. The patient has returned to daily activities of life without significant pain or discomfort other than occasional thigh and right knee pain. She does not report fever or chills.

## 2023-09-05 NOTE — ADDENDUM
[FreeTextEntry1] : This note was authored by Ruperto Merino working as a medical scribe for Dr. Johan Byrnes. The note was reviewed, edited, and revised by Dr. Johan Byrnes whom is in agreement with the exam findings, imaging findings, and treatment plan. 09/05/2023

## 2023-09-05 NOTE — PHYSICAL EXAM
[de-identified] : The patient appears well nourished and in no apparent distress.  The patient is alert and oriented to person, place, and time.   Affect and mood appear normal. The head is normocephalic and atraumatic.  The eyes reveal normal sclera and extra ocular muscles are intact. The tongue is midline with no apparent lesions.  Skin shows normal turgor with no evidence of eczema or psoriasis.  No respiratory distress noted.  Sensation grossly intact.		  [de-identified] : Exam of the right hip shows a well healed incision without warmth or erythema, hip external rotation of 40 degrees, hip internal rotation of 0 degrees. Patient can perform a straight leg raise without pain. 5/5 motor strength bilaterally distally. Sensation intact distally.	  [de-identified] : X-ray: AP of the pelvis and 2 views of the right hip demonstrate a right total hip arthroplasty in stable position, with no evidence of fracture, loosening, or dislocation. She has a healed right trochanteric osteotomy with hardware intact and no evidence of loosening. AP pelvis view shows moderate left hip joint space narrowing.

## 2023-10-03 ENCOUNTER — APPOINTMENT (OUTPATIENT)
Dept: PHYSICAL MEDICINE AND REHAB | Facility: CLINIC | Age: 63
End: 2023-10-03
Payer: COMMERCIAL

## 2023-10-03 VITALS
SYSTOLIC BLOOD PRESSURE: 130 MMHG | HEART RATE: 90 BPM | BODY MASS INDEX: 35.49 KG/M2 | OXYGEN SATURATION: 98 % | WEIGHT: 213 LBS | DIASTOLIC BLOOD PRESSURE: 90 MMHG | HEIGHT: 65 IN

## 2023-10-03 DIAGNOSIS — T84.019A BROKEN INTERNAL JOINT PROSTHESIS, UNSPECIFIED SITE, INITIAL ENCOUNTER: ICD-10-CM

## 2023-10-03 DIAGNOSIS — M79.18 MYALGIA, OTHER SITE: ICD-10-CM

## 2023-10-03 DIAGNOSIS — M25.551 PAIN IN RIGHT HIP: ICD-10-CM

## 2023-10-03 DIAGNOSIS — M54.16 RADICULOPATHY, LUMBAR REGION: ICD-10-CM

## 2023-10-03 DIAGNOSIS — Z96.649 BROKEN INTERNAL JOINT PROSTHESIS, UNSPECIFIED SITE, INITIAL ENCOUNTER: ICD-10-CM

## 2023-10-03 PROCEDURE — 99204 OFFICE O/P NEW MOD 45 MIN: CPT | Mod: GC

## 2023-10-03 RX ORDER — MELOXICAM 7.5 MG/1
7.5 TABLET ORAL
Qty: 60 | Refills: 0 | Status: ACTIVE | COMMUNITY
Start: 2023-10-03 | End: 1900-01-01

## 2023-10-18 ENCOUNTER — APPOINTMENT (OUTPATIENT)
Dept: MRI IMAGING | Facility: CLINIC | Age: 63
End: 2023-10-18

## 2024-04-02 ENCOUNTER — APPOINTMENT (OUTPATIENT)
Dept: OBGYN | Facility: CLINIC | Age: 64
End: 2024-04-02
Payer: COMMERCIAL

## 2024-04-02 PROCEDURE — 96127 BRIEF EMOTIONAL/BEHAV ASSMT: CPT

## 2024-04-02 PROCEDURE — 99459 PELVIC EXAMINATION: CPT

## 2024-04-02 PROCEDURE — 99396 PREV VISIT EST AGE 40-64: CPT

## 2024-04-17 ENCOUNTER — APPOINTMENT (OUTPATIENT)
Dept: OBGYN | Facility: CLINIC | Age: 64
End: 2024-04-17
Payer: COMMERCIAL

## 2024-04-17 PROCEDURE — 76830 TRANSVAGINAL US NON-OB: CPT

## 2024-04-30 ENCOUNTER — OUTPATIENT (OUTPATIENT)
Dept: OUTPATIENT SERVICES | Facility: HOSPITAL | Age: 64
LOS: 1 days | End: 2024-04-30
Payer: COMMERCIAL

## 2024-04-30 VITALS
TEMPERATURE: 98 F | WEIGHT: 205.03 LBS | OXYGEN SATURATION: 98 % | RESPIRATION RATE: 16 BRPM | HEART RATE: 78 BPM | DIASTOLIC BLOOD PRESSURE: 82 MMHG | SYSTOLIC BLOOD PRESSURE: 123 MMHG | HEIGHT: 64.5 IN

## 2024-04-30 DIAGNOSIS — Z01.818 ENCOUNTER FOR OTHER PREPROCEDURAL EXAMINATION: ICD-10-CM

## 2024-04-30 DIAGNOSIS — Z98.890 OTHER SPECIFIED POSTPROCEDURAL STATES: Chronic | ICD-10-CM

## 2024-04-30 DIAGNOSIS — Z98.891 HISTORY OF UTERINE SCAR FROM PREVIOUS SURGERY: Chronic | ICD-10-CM

## 2024-04-30 DIAGNOSIS — Z96.641 PRESENCE OF RIGHT ARTIFICIAL HIP JOINT: Chronic | ICD-10-CM

## 2024-04-30 DIAGNOSIS — N84.0 POLYP OF CORPUS UTERI: ICD-10-CM

## 2024-04-30 DIAGNOSIS — Z96.649 PRESENCE OF UNSPECIFIED ARTIFICIAL HIP JOINT: Chronic | ICD-10-CM

## 2024-04-30 DIAGNOSIS — G47.33 OBSTRUCTIVE SLEEP APNEA (ADULT) (PEDIATRIC): ICD-10-CM

## 2024-04-30 LAB
ANION GAP SERPL CALC-SCNC: 11 MMOL/L — SIGNIFICANT CHANGE UP (ref 5–17)
BUN SERPL-MCNC: 28 MG/DL — HIGH (ref 7–23)
CALCIUM SERPL-MCNC: 9.8 MG/DL — SIGNIFICANT CHANGE UP (ref 8.4–10.5)
CHLORIDE SERPL-SCNC: 109 MMOL/L — HIGH (ref 96–108)
CO2 SERPL-SCNC: 23 MMOL/L — SIGNIFICANT CHANGE UP (ref 22–31)
CREAT SERPL-MCNC: 0.74 MG/DL — SIGNIFICANT CHANGE UP (ref 0.5–1.3)
EGFR: 90 ML/MIN/1.73M2 — SIGNIFICANT CHANGE UP
GLUCOSE SERPL-MCNC: 100 MG/DL — HIGH (ref 70–99)
HCT VFR BLD CALC: 44.4 % — SIGNIFICANT CHANGE UP (ref 34.5–45)
HGB BLD-MCNC: 13.9 G/DL — SIGNIFICANT CHANGE UP (ref 11.5–15.5)
MCHC RBC-ENTMCNC: 29.7 PG — SIGNIFICANT CHANGE UP (ref 27–34)
MCHC RBC-ENTMCNC: 31.3 GM/DL — LOW (ref 32–36)
MCV RBC AUTO: 94.9 FL — SIGNIFICANT CHANGE UP (ref 80–100)
NRBC # BLD: 0 /100 WBCS — SIGNIFICANT CHANGE UP (ref 0–0)
PLATELET # BLD AUTO: 379 K/UL — SIGNIFICANT CHANGE UP (ref 150–400)
POTASSIUM SERPL-MCNC: 4.5 MMOL/L — SIGNIFICANT CHANGE UP (ref 3.5–5.3)
POTASSIUM SERPL-SCNC: 4.5 MMOL/L — SIGNIFICANT CHANGE UP (ref 3.5–5.3)
RBC # BLD: 4.68 M/UL — SIGNIFICANT CHANGE UP (ref 3.8–5.2)
RBC # FLD: 13 % — SIGNIFICANT CHANGE UP (ref 10.3–14.5)
SODIUM SERPL-SCNC: 143 MMOL/L — SIGNIFICANT CHANGE UP (ref 135–145)
WBC # BLD: 6.6 K/UL — SIGNIFICANT CHANGE UP (ref 3.8–10.5)
WBC # FLD AUTO: 6.6 K/UL — SIGNIFICANT CHANGE UP (ref 3.8–10.5)

## 2024-04-30 PROCEDURE — 85027 COMPLETE CBC AUTOMATED: CPT

## 2024-04-30 PROCEDURE — G0463: CPT

## 2024-04-30 PROCEDURE — 80048 BASIC METABOLIC PNL TOTAL CA: CPT

## 2024-04-30 RX ORDER — SODIUM CHLORIDE 9 MG/ML
3 INJECTION INTRAMUSCULAR; INTRAVENOUS; SUBCUTANEOUS EVERY 8 HOURS
Refills: 0 | Status: DISCONTINUED | OUTPATIENT
Start: 2024-05-15 | End: 2024-05-29

## 2024-04-30 RX ORDER — LIDOCAINE HCL 20 MG/ML
0.2 VIAL (ML) INJECTION ONCE
Refills: 0 | Status: DISCONTINUED | OUTPATIENT
Start: 2024-05-15 | End: 2024-05-29

## 2024-04-30 RX ORDER — SODIUM CHLORIDE 9 MG/ML
1000 INJECTION, SOLUTION INTRAVENOUS
Refills: 0 | Status: DISCONTINUED | OUTPATIENT
Start: 2024-05-15 | End: 2024-05-29

## 2024-04-30 NOTE — H&P PST ADULT - ATTENDING PHYSICIAN: I HAVE REVIEWED THE CLINICAL DOCUMENTATION AND AGREE WITH THE ABOVE NOTE
(1) other risk factor (includes escalating BMI, pack-years of smoking, diabetes requiring insulin, chemotherapy, female gender and length of surgery)/(2) malignancy (present or previous)
Statement Selected

## 2024-04-30 NOTE — H&P PST ADULT - HISTORY OF PRESENT ILLNESS
63  year old female s/p abdominoplasty in 01/2024 ([facility]) presents  with c/o postmenopausal bleeding / spotting for months, S/p GYN check up/ sonogram reveal having endometrial polyp, scheduled for D&C, Operative hysteroscopy, myosure for resection of endometrial Polyp on 05/15/24.      PMH of HTN, JOSE LUIS (sometimes on CPAP), asthma (last PRN use 15 yrs ago), obesity (BMI 37.5) & OA s/p right THR in 2017, total 3 hip surgeries in the past ( THR in 2017 at [facility] , had revision in 05/2022 due to non union in 05/2022 & then had bone grafting/ plate was placed in 9/2022 with debilitating right hip pain due to infected bone).

## 2024-04-30 NOTE — H&P PST ADULT - NSICDXPASTMEDICALHX_GEN_ALL_CORE_FT
PAST MEDICAL HISTORY:  Broken internal right hip prosthesis, initial encounter     Failure of right total hip arthroplasty, initial encounter     Hypertension     Migraines     OA (osteoarthritis)     JOSE LUIS (obstructive sleep apnea)

## 2024-04-30 NOTE — H&P PST ADULT - ATTENDING COMMENTS
62 yo with post menopausal bleeding, noted on sono to have an EMP, admitted for oper hysteroscopy, resection EMP with D&C----rbas reviewed--- MEHRDAD Buck MD

## 2024-04-30 NOTE — H&P PST ADULT - ASSESSMENT
63  year old female s/p abdominoplasty in 01/2024 ([facility]) presents  with c/o postmenopausal bleeding / spotting for months, S/p GYN check up/ sonogram reveal having endometrial polyp, scheduled for D&C, Operative hysteroscopy, myosure for resection of endometrial Polyp on 05/15/24.    Activity:   physically  active , walks few 2-3 blocks daily, home chores, take sairs  Energy Expenditure score (DASI SCORE METS): 6.4  Symptoms : denies chest pain, palpitations, dyspnea, dizziness or  CELESTIN,   Dental: Patient denies Loose teeth/Denture.

## 2024-04-30 NOTE — H&P PST ADULT - NSICDXPASTSURGICALHX_GEN_ALL_CORE_FT
PAST SURGICAL HISTORY:  H/O  section     History of bunionectomy right foot 2000    History of total right hip replacement 2017 at [facility]    S/P abdominoplasty     S/P excision of lipoma [id number]    S/P revision of total hip

## 2024-04-30 NOTE — H&P PST ADULT - NSICDXPROCEDURE_GEN_ALL_CORE_FT
PROCEDURES:  D&C (dilatation and curettage, scraping of uterus) 30-Apr-2024 09:38:27 D&C, Operative hysteroscopy, myosure for resection of endometrial Polyp on 05/15/24. Jose Samano

## 2024-04-30 NOTE — H&P PST ADULT - PROBLEM SELECTOR PLAN 1
D&C, Operative hysteroscopy, myosure for resection of endometrial Polyp on 05/15/24.  preop cbc, bmp sent  Instructed to continue HTN meds &  take with sips of water in AM the day of surgery

## 2024-05-14 ENCOUNTER — TRANSCRIPTION ENCOUNTER (OUTPATIENT)
Age: 64
End: 2024-05-14

## 2024-05-15 ENCOUNTER — APPOINTMENT (OUTPATIENT)
Dept: OBGYN | Facility: HOSPITAL | Age: 64
End: 2024-05-15
Payer: COMMERCIAL

## 2024-05-15 ENCOUNTER — OUTPATIENT (OUTPATIENT)
Dept: OUTPATIENT SERVICES | Facility: HOSPITAL | Age: 64
LOS: 1 days | End: 2024-05-15
Payer: COMMERCIAL

## 2024-05-15 ENCOUNTER — TRANSCRIPTION ENCOUNTER (OUTPATIENT)
Age: 64
End: 2024-05-15

## 2024-05-15 ENCOUNTER — RESULT REVIEW (OUTPATIENT)
Age: 64
End: 2024-05-15

## 2024-05-15 VITALS
HEART RATE: 82 BPM | OXYGEN SATURATION: 100 % | TEMPERATURE: 97 F | DIASTOLIC BLOOD PRESSURE: 90 MMHG | SYSTOLIC BLOOD PRESSURE: 133 MMHG | RESPIRATION RATE: 16 BRPM | HEIGHT: 64.5 IN | WEIGHT: 205.03 LBS

## 2024-05-15 VITALS
DIASTOLIC BLOOD PRESSURE: 58 MMHG | TEMPERATURE: 98 F | RESPIRATION RATE: 16 BRPM | HEART RATE: 78 BPM | OXYGEN SATURATION: 97 % | SYSTOLIC BLOOD PRESSURE: 121 MMHG

## 2024-05-15 DIAGNOSIS — Z98.890 OTHER SPECIFIED POSTPROCEDURAL STATES: Chronic | ICD-10-CM

## 2024-05-15 DIAGNOSIS — Z96.649 PRESENCE OF UNSPECIFIED ARTIFICIAL HIP JOINT: Chronic | ICD-10-CM

## 2024-05-15 DIAGNOSIS — Z98.891 HISTORY OF UTERINE SCAR FROM PREVIOUS SURGERY: Chronic | ICD-10-CM

## 2024-05-15 DIAGNOSIS — Z96.641 PRESENCE OF RIGHT ARTIFICIAL HIP JOINT: Chronic | ICD-10-CM

## 2024-05-15 DIAGNOSIS — N84.0 POLYP OF CORPUS UTERI: ICD-10-CM

## 2024-05-15 PROCEDURE — 58558 HYSTEROSCOPY BIOPSY: CPT

## 2024-05-15 PROCEDURE — 88305 TISSUE EXAM BY PATHOLOGIST: CPT | Mod: 26

## 2024-05-15 PROCEDURE — 88305 TISSUE EXAM BY PATHOLOGIST: CPT

## 2024-05-15 DEVICE — MYOSURE TISSUE REMOVAL FMS FOR FLUENT XL: Type: IMPLANTABLE DEVICE | Status: FUNCTIONAL

## 2024-05-15 DEVICE — MYOSURE TISSUE REMOVAL DEVICE LITE: Type: IMPLANTABLE DEVICE | Status: FUNCTIONAL

## 2024-05-15 DEVICE — MYOSURE TISSUE REMOVAL DEVICE REACH: Type: IMPLANTABLE DEVICE | Status: FUNCTIONAL

## 2024-05-15 RX ORDER — OXYCODONE HYDROCHLORIDE 5 MG/1
10 TABLET ORAL ONCE
Refills: 0 | Status: DISCONTINUED | OUTPATIENT
Start: 2024-05-15 | End: 2024-05-15

## 2024-05-15 RX ORDER — HYDROCHLOROTHIAZIDE 25 MG
1 TABLET ORAL
Refills: 0 | DISCHARGE

## 2024-05-15 RX ORDER — ONDANSETRON 8 MG/1
4 TABLET, FILM COATED ORAL ONCE
Refills: 0 | Status: DISCONTINUED | OUTPATIENT
Start: 2024-05-15 | End: 2024-05-29

## 2024-05-15 RX ORDER — OXYCODONE HYDROCHLORIDE 5 MG/1
5 TABLET ORAL ONCE
Refills: 0 | Status: DISCONTINUED | OUTPATIENT
Start: 2024-05-15 | End: 2024-05-15

## 2024-05-15 RX ORDER — APREPITANT 80 MG/1
40 CAPSULE ORAL ONCE
Refills: 0 | Status: COMPLETED | OUTPATIENT
Start: 2024-05-15 | End: 2024-05-15

## 2024-05-15 RX ADMIN — SODIUM CHLORIDE 100 MILLILITER(S): 9 INJECTION, SOLUTION INTRAVENOUS at 11:04

## 2024-05-15 RX ADMIN — APREPITANT 40 MILLIGRAM(S): 80 CAPSULE ORAL at 11:02

## 2024-05-15 NOTE — ASU PATIENT PROFILE, ADULT - NSICDXPASTSURGICALHX_GEN_ALL_CORE_FT
PAST SURGICAL HISTORY:  H/O  section     History of bunionectomy right foot 2000    History of total right hip replacement 2017 at [facility]    S/P abdominoplasty     S/P excision of lipoma [id number]    S/P revision of total hip

## 2024-05-15 NOTE — ASU PATIENT PROFILE, ADULT - NSCAFFEAMTFREQ_GEN_ALL_CORE_SD
Patient seen today for 45 minute therapy appointment.     She reports that she is not doing well.  She has issues with her friends, they complain that she is annoying, that she complains too much, that she's to loud that she fakes things.  She stated her old self used to be cheerful, suite, carrying.  But now her new self is blunt, cold, no emotion.    We talk about her relationship with her father which has caused her problems.  Relationship with her immediate ex-boyfriend Fernando which is been off again on again.  She also has another ex-boyfriend Aubrey who's been talking to her lately and seems to be wanting to rekindle the relationship.    Today we work on her anger, loudness, politeness, and complaining.  She can recognize when she is having anger and remind self that is not directed at the person she is talking to and it's inappropriate.  She can work on modulating her voice and asking her friends to remind if she is talking too loud.  She can make an internal rule that she's not going to say negative things.  She can catch herself complaining apologize for it, and dial it back.      
1-2 cups/cans per day

## 2024-05-15 NOTE — ASU DISCHARGE PLAN (ADULT/PEDIATRIC) - CARE PROVIDER_API CALL
Salvador Buck  Obstetrics and Gynecology  [address] [place] [address], [address]  [place] [address]  Phone: [phone]  Fax: [phone]  Follow Up Time:

## 2024-05-15 NOTE — ASU PATIENT PROFILE, ADULT - IS PATIENT PREGNANT?
Assessment/Plan:    No problem-specific Assessment & Plan notes found for this encounter  Diagnoses and all orders for this visit:    Encounter for well woman exam    Postmenopausal    Screening for breast cancer  -     Mammo screening bilateral w 3d & cad; Future    Other orders  -     LORazepam (ATIVAN) 0 5 mg tablet  -     Multiple Vitamins-Minerals (MULTIVITAMIN GUMMIES WOMENS) CHEW; Chew 2 tablets daily          Subjective:      Patient ID: Karly Duran is a 61 y o  female  Pt presents for her annual exam today--  She has no complaints  She has no bleeding or pelvic pain--postmeno  Bowel and bladder are regular  Colonoscopy--2013  No breast concerns today  Last mammo--9/18    No pap today  rx mammo  Mild cystocele--same--kegel  Factor v      The following portions of the patient's history were reviewed and updated as appropriate: allergies, current medications, past family history, past medical history, past social history, past surgical history and problem list     Review of Systems   Constitutional: Negative for chills, fever and unexpected weight change  Gastrointestinal: Negative for abdominal pain, blood in stool, constipation and diarrhea  Genitourinary: Negative  Objective:      /60 (BP Location: Left arm, Patient Position: Sitting, Cuff Size: Standard)   Ht 5' 10 87" (1 8 m)   Wt 73 3 kg (161 lb 8 oz)   LMP 11/01/2013 (Within Weeks)   BMI 22 61 kg/m²          Physical Exam   Constitutional: She appears well-developed and well-nourished  HENT:   Head: Normocephalic and atraumatic  Neck: Normal range of motion  Pulmonary/Chest: Right breast exhibits no inverted nipple, no mass, no nipple discharge and no skin change  Left breast exhibits no inverted nipple, no mass, no nipple discharge and no skin change  Abdominal: Soft  Genitourinary: Vagina normal and uterus normal  Pelvic exam was performed with patient supine   There is no rash, tenderness or lesion on the right labia  There is no rash, tenderness or lesion on the left labia  Cervix exhibits no motion tenderness, no discharge and no friability  Right adnexum displays no mass, no tenderness and no fullness  Left adnexum displays no mass, no tenderness and no fullness  Lymphadenopathy: No inguinal adenopathy noted on the right or left side  Nursing note and vitals reviewed  no

## 2024-05-15 NOTE — BRIEF OPERATIVE NOTE - NSICDXBRIEFPROCEDURE_GEN_ALL_CORE_FT
PROCEDURES:  Pelvic examination under anesthesia 15-May-2024 13:56:35  Isela Taylor  Hysteroscopic polypectomy using MyoSure tissue removal system 15-May-2024 13:56:16  Isela Taylor  Dilation and curettage of uterus 15-May-2024 13:56:27  Isela Taylor

## 2024-05-15 NOTE — ASU DISCHARGE PLAN (ADULT/PEDIATRIC) - ASU DC SPECIAL INSTRUCTIONSFT
Postoperative Instructions      Pain control     For pain control, take the followin. Motrin 600mg four times a day, take with food.  2. Add Tylenol 975 four times a day, alternated with motrin.    Motrin and Tylenol can be obtained over the counter.    Postoperative restrictions   Do not drive or make important decisions for 24 hours after anesthesia. Nothing in the vagina (tampons, sexual intercourse), no tub baths, pools or hot tubs for 2 weeks (showers are ok!). No lifting anything heavier than 15 lbs, no strenuous exercise for 2 weeks after surgery.        Vaginal bleeding   Spotting and intermittent passage of blood clots per vagina is normal in first few weeks after surgery.  If you are soaking 1 pad per hour, that is NOT normal and you should notify Dr. Buck's office and seek medical attention right away.      Signs of Infection    Call the office and/or come to the emergency room for any of the following: foul smelling vaginal discharge, fever over 100.4F, abdominal pain or cramping that does not get better with over the counter medications, nausea/vomiting (especially if you become unable to tolerate oral intake), inability to urinate. Any of these could be signs that you may be developing an infection requiring antibiotics.      Follow Up  Call Dr. Buck's office to schedule a postoperative appointment in 2 weeks.

## 2024-05-15 NOTE — ASU DISCHARGE PLAN (ADULT/PEDIATRIC) - PROCEDURE
Pelvic examination under anesthesia, diagnostic hysteroscopy, dilation and curettage Pelvic examination under anesthesia, hysteroscopic polypectomy, dilation and curettage

## 2024-05-15 NOTE — BRIEF OPERATIVE NOTE - OPERATION/FINDINGS
Normal external female genitalia. Small mobile anteverted uterus.  Ostia visualized bilaterally. Atrophic endometrium with small polyp on the anterior lower segment of the uterine cavity. Fluid deficit 115cc.

## 2024-05-15 NOTE — ASU PATIENT PROFILE, ADULT - FALL HARM RISK - UNIVERSAL INTERVENTIONS
Bed in lowest position, wheels locked, appropriate side rails in place/Call bell, personal items and telephone in reach/Instruct patient to call for assistance before getting out of bed or chair/Non-slip footwear when patient is out of bed/[place] to call system/Physically safe environment - no spills, clutter or unnecessary equipment/Purposeful Proactive Rounding/Room/bathroom lighting operational, light cord in reach

## 2024-05-16 PROBLEM — G43.909 MIGRAINE, UNSPECIFIED, NOT INTRACTABLE, WITHOUT STATUS MIGRAINOSUS: Chronic | Status: ACTIVE | Noted: 2024-04-30

## 2024-05-22 LAB — SURGICAL PATHOLOGY STUDY: SIGNIFICANT CHANGE UP

## 2024-06-06 ENCOUNTER — APPOINTMENT (OUTPATIENT)
Dept: OBGYN | Facility: CLINIC | Age: 64
End: 2024-06-06
Payer: COMMERCIAL

## 2024-06-06 PROCEDURE — 99212 OFFICE O/P EST SF 10 MIN: CPT

## 2024-06-10 NOTE — PATIENT PROFILE ADULT - BRAND OF FIRST COVID-19 BOOSTER
[facility] - FAMILY MEDICINE       PATIENT NAME: Tray Jalloh   : 1952    AGE: 71 y.o. DATE OF ENCOUNTER: 6/10/24    MRN: [id number]      PCP: Elizabeth Lange FNP    Subjective:     Reason for Visit / Chief Complaint:     274}  Chief Complaint   Patient presents with    Surgery Clearance     Patient reports to the clinic today for surgical clearance, is having right  knee replacement on .    Health Maintenance     Care gaps up to date.    Melissa Julio CMA       HPI:    Presents for surgical clearance for robotic left total knee arthroplasty per Dr. Dada Enriquez scheduled 24.      Is currently completing his 2nd round of trimethoprim for UTI and we will recheck his UA today.    States Dr. Preciado told him he does not have COPD; has SOB from anemia and deconditioning.  Started a CPAP 3 nights ago.    Last appointment with Cardiology, Dr. Hanh Man, 2023.    Depression - reports not taking fluoxetine because he was concerned about increased sleepiness while on other medications    Review of Systems:     Review of Systems   Constitutional:  Positive for fatigue. Negative for fever.   Respiratory: Negative.  Negative for cough and wheezing. Shortness of breath: chronic, intermittent.   Cardiovascular: Negative.    Gastrointestinal: Negative.    Genitourinary: Negative.    Musculoskeletal:  Positive for arthralgias, back pain, gait problem and joint swelling (left knee, chronic).   Skin: Negative.    Neurological:  Positive for weakness.   Psychiatric/Behavioral:  Positive for dysphoric mood. Negative for self-injury and suicidal ideas.        Allergies and Meds: 274}     Review of patient's allergies indicates:   Allergen Reactions    Bee pollen     Grass pollen- grass standard         Current Outpatient Medications   Medication Sig Dispense Refill    acetaminophen (TYLENOL) 325 MG tablet Take 325 mg by mouth every 6 (six) hours as needed for Pain.      albuterol  (VENTOLIN HFA) 90 mcg/actuation inhaler Inhale 2 puffs into the lungs every 6 (six) hours as needed for Wheezing or Shortness of Breath. Rescue 8 g 3    amLODIPine (NORVASC) 5 MG tablet Take 2 tablets (10 mg total) by mouth once daily. 180 tablet 3    ascorbic acid, vitamin C, (VITAMIN C) 1000 MG tablet Take 1,000 mg by mouth once daily.      benzonatate (TESSALON) 200 MG capsule Take 1 capsule (200 mg total) by mouth 3 (three) times daily as needed for Cough. 30 capsule 0    cetirizine (ZYRTEC) 10 MG tablet Take 1 tablet (10 mg total) by mouth once daily. 90 tablet 3    cholecalciferol, vitamin D3, (VITAMIN D3) 50 mcg (2,000 unit) Cap Take 1 capsule by mouth once daily.      CRESTOR 20 mg tablet Take 10 mg by mouth once daily.      cyanocobalamin (VITAMIN B-12) 1000 MCG tablet Take 100 mcg by mouth once daily.      diclofenac sodium (VOLTAREN) 1 % Gel Apply topically 4 (four) times daily. (Patient taking differently: Apply topically 4 (four) times daily as needed.) 900 g 5    diphenhydrAMINE (BENADRYL) 25 mg capsule Take 25 mg by mouth every 6 (six) hours as needed for Itching.      fluticasone propionate (FLONASE) 50 mcg/actuation nasal spray 1 spray by Each Nostril route daily as needed for Rhinitis or Allergies.      hydrocortisone 2.5 % cream Apply 30 g/kg topically 2 (two) times daily as needed. Apply to face for dry skin      ketoconazole (NIZORAL) 2 % cream Apply topically daily as needed.      lisinopriL (PRINIVIL,ZESTRIL) 40 MG tablet Take 1 tablet (40 mg total) by mouth once daily. 90 tablet 3    magnesium oxide 420 mg Tab Take 420 mg by mouth once daily.      methenamine (HIPREX) 1 gram Tab Take 1 tablet (1 g total) by mouth 2 (two) times daily. 180 tablet 3    multivitamin Tab Take 1 tablet by mouth once daily. 90 tablet 3    omega-3 fatty acids/fish oil (FISH OIL-OMEGA-3 FATTY ACIDS) 300-1,000 mg capsule Take 1 capsule by mouth 2 (two) times a day.      omeprazole (PRILOSEC) 20 MG capsule Take 1  capsule (20 mg total) by mouth once daily. 90 capsule 3    pregabalin (LYRICA) 75 MG capsule TAKE ONE (1) CAPSULE BY MOUTH TWO (2)  TIMES DAILY. (Patient taking differently: Take 75 mg by mouth 2 (two) times daily. TAKE ONE (1) CAPSULE BY MOUTH TWO (2)  TIMES DAILY.) 60 capsule 5    tolterodine (DETROL LA) 2 MG Cp24 Take 2 mg by mouth once daily.      traMADoL (ULTRAM) 50 mg tablet Take 1 tablet (50 mg total) by mouth every 8 (eight) hours as needed for Pain. 25 tablet 0    triamcinolone acetonide 0.1% (KENALOG) 0.1 % cream Apply 15 g topically 2 (two) times daily as needed. Apply to back for dry skin and itching      trimethoprim (TRIMPEX) 100 mg Tab Take 1 tablet (100 mg total) by mouth every 12 (twelve) hours. for 10 days 20 tablet 0    vitamin E 1000 UNIT capsule Take 1,000 Units by mouth once daily.      zinc gluconate 50 mg tablet Take 50 mg by mouth once daily.      FLUoxetine 10 MG capsule Take 1 capsule (10 mg total) by mouth once daily. (Patient not taking: Reported on 6/10/2024) 30 capsule 0     No current facility-administered medications for this visit.       Labs:274}   I have reviewed labs below:    Lab Results   Component Value Date    WBC 10.51 05/12/2024    RBC 4.17 (L) 05/12/2024    HGB 12.8 (L) 05/12/2024    HCT 42.7 05/12/2024     05/12/2024     05/12/2024    K 3.5 05/12/2024     (H) 05/12/2024    CALCIUM 9.1 05/12/2024     (H) 05/12/2024    BUN 26 (H) 05/12/2024    CREATININE 1.47 (H) 05/12/2024    ESTGFRAFRICA 65 12/03/2020    EGFRNONAA 70 05/12/2022    ALT 20 06/07/2023    AST 14 (L) 06/07/2023    INR 1.19 07/28/2020    CHOL 140 05/15/2023    TRIG 105 05/15/2023    HDL 40 05/15/2023    LDLCALC 79 05/15/2023    TSH 1.950 06/07/2023    PSA <0.010 05/15/2023    HGBA1C 5.4 12/12/2023    MICROALBUR 2.5 05/30/2024       Point Of Care Testing (if applicable) :  Lab Results   Component Value Date    [id number] Negative 05/30/2024    FLUAMOLEC Negative 05/30/2024     "FLUBMOLEC Negative 2024    MOLSTREPAPOC Negative 2024       Medical History: 274}     Past Medical History:   Diagnosis Date    Cervical radiculopathy     Chronic pain     Chronic renal impairment     COVID-19 2022    Depression     Depressive disorder 2022    Digestive disorder     GERD (gastroesophageal reflux disease)     History of prediabetes 2022    Hypercholesteremia     Hypertension     Left ventricular hypertrophy     Lumbosacral spondylosis     Positive colorectal cancer screening using Cologuard test 12/15/2022    Prediabetes     Pulmonary nodules     repeat CT due 21      Social History     Tobacco Use   Smoking Status Former    Current packs/day: 0.00    Average packs/day: 2.0 packs/day for 30.0 years (60.0 ttl pk-yrs)    Types: Cigarettes, Pipe, Cigars    Quit date:     Years since quittin.4    Passive exposure: Past   Smokeless Tobacco Never      Objective:  274}   Vital Signs  Temp: 97.7 °F (36.5 °C)  Temp Source: Oral  Pulse: 80  Resp: 18  SpO2: 96 %  BP: 130/72  BP Location: Left arm  Patient Position: Sitting  Height and Weight  Height: 5' 10" (177.8 cm)  Weight: 88.9 kg (196 lb)  BSA (Calculated - sq m): 2.1 sq meters  BMI (Calculated): 28.1  Weight in (lb) to have BMI = 25: 173.9    Over the last two weeks how often have you been bothered by little interest or pleasure in doing things: 0  Over the last two weeks how often have you been bothered by feeling down, depressed or hopeless: 0  PHQ-2 Total Score: 0  PHQ-9 Score: 0  PHQ-9 Interpretation: Minimal or None    Wt Readings from Last 3 Encounters:   06/10/24 88.9 kg (196 lb)   24 88 kg (194 lb)   24 89.8 kg (198 lb)     Physical Exam  Vitals and nursing note reviewed.   Constitutional:       General: He is not in acute distress.     Appearance: Normal appearance.   HENT:      Head: Normocephalic.      Right Ear: Tympanic membrane, ear canal and external ear normal.      Left Ear: " Tympanic membrane, ear canal and external ear normal.      Nose: Nose normal.      Mouth/Throat:      Mouth: Mucous membranes are moist.      Pharynx: Oropharynx is clear.   Eyes:      Conjunctiva/sclera: Conjunctivae normal.   Neck:      Thyroid: No thyromegaly or thyroid tenderness.      Trachea: Trachea normal.   Cardiovascular:      Rate and Rhythm: Normal rate and regular rhythm.      Pulses: Normal pulses.      Heart sounds: Normal heart sounds.   Pulmonary:      Effort: Pulmonary effort is normal. No respiratory distress.      Breath sounds: Normal breath sounds. No wheezing, rhonchi or rales.   Abdominal:      Palpations: Abdomen is soft.      Tenderness: There is no abdominal tenderness.   Musculoskeletal:      Cervical back: Neck supple.      Right lower leg: No edema.      Left lower leg: No edema.   Lymphadenopathy:      Cervical: No cervical adenopathy.   Skin:     General: Skin is warm and dry.   Neurological:      Mental Status: He is alert and oriented to person, place, and time.      Motor: Weakness (generalized) present.      Gait: Gait abnormal (using cane).   Psychiatric:         Attention and Perception: Attention normal.         Speech: Speech normal.         Behavior: Behavior normal. Behavior is cooperative.         Thought Content: Thought content normal. Thought content is not paranoid. Thought content does not include homicidal or suicidal ideation.         Cognition and Memory: Cognition normal.         Judgment: Judgment normal.         Assessment and Plan: 274}     1. Preprocedural examination  Assessment & Plan:  Preop labs as ordered per Dr. Dada Enriquez and recheck UA for f/u treatment of UTI.  No x-ray tech in clinic so will go to the imaging center for chest x-ray and EKG at the [facility] as ordered per Dr. Dada Enriquez for preop clearance.    He is clear for surgery from a primary care standpoint pending clearance of recent UTI and as long as no abnormalities noted on chest x-ray or  EKG that would warrant the need for further clearance.  If EKG is abnormal, he is established with Cardiology, Dr. Hanh Man.    Orders:  -     APTT  -     CBC Auto Differential  -     Comprehensive Metabolic Panel  -     Protime-INR  -     Type & Screen    2. E. coli UTI (urinary tract infection)  Assessment & Plan:  Complete trimethoprim as prescribed.  Recheck UA with reflex to culture if indicated to ensure resolution of infection.  Resume Hiprex after completion of trimethoprim due to history of recurrent UTIs.    Orders:  -     Urinalysis, Reflex to Urine Culture; Future; Expected date: 06/10/2024    3. Chronic pain of left knee    4. Primary osteoarthritis of left knee    5. Anemia of chronic disease  Assessment & Plan:  Lab Results   Component Value Date    WBC 10.51 05/12/2024    HGB 12.8 (L) 05/12/2024    HCT 42.7 05/12/2024    .4 (H) 05/12/2024     05/12/2024     Stable hemoglobin 11.9-12.8 x 2+ years.      6. Chronic kidney disease, stage 3a  Assessment & Plan:  Stable, continue monitoring.        Diagnosis, risks, benefits, and side effects of any meds and treatment plan were discussed with the patient.  All questions were answered to the satisfaction of the patient, and pt verbalized understanding and agreement to treatment plan.      Follow up for For Medicare AWV in July as scheduled.    Signature:  MOLLY Murry-BC    Future Appointments   Date Time Provider Department Center   6/13/2024  9:30 AM Dada Enriquez MD [facility] ORTHO [facility]   7/17/2024  8:00 AM AWV NURSE, [facility] FAMILY MEDICINE [facility]   12/9/2024  8:30 AM Enrico Jauregui FNP [facility] NEURO [facility]   4/9/2025 10:00 AM Rosalia Preciado MD [facility]  PULM [facility]        [employer]

## 2024-09-26 ENCOUNTER — APPOINTMENT (OUTPATIENT)
Dept: ORTHOPEDIC SURGERY | Facility: CLINIC | Age: 64
End: 2024-09-26
Payer: COMMERCIAL

## 2024-09-26 VITALS
WEIGHT: 200 LBS | SYSTOLIC BLOOD PRESSURE: 143 MMHG | BODY MASS INDEX: 33.32 KG/M2 | HEART RATE: 89 BPM | DIASTOLIC BLOOD PRESSURE: 99 MMHG | HEIGHT: 65 IN

## 2024-09-26 DIAGNOSIS — Z96.641 PRESENCE OF RIGHT ARTIFICIAL HIP JOINT: ICD-10-CM

## 2024-09-26 DIAGNOSIS — M17.11 UNILATERAL PRIMARY OSTEOARTHRITIS, RIGHT KNEE: ICD-10-CM

## 2024-09-26 PROCEDURE — 73564 X-RAY EXAM KNEE 4 OR MORE: CPT | Mod: 26,RT

## 2024-09-26 PROCEDURE — 99214 OFFICE O/P EST MOD 30 MIN: CPT

## 2024-09-26 PROCEDURE — 73502 X-RAY EXAM HIP UNI 2-3 VIEWS: CPT | Mod: 26,RT

## 2024-09-26 PROCEDURE — G2211 COMPLEX E/M VISIT ADD ON: CPT | Mod: NC

## 2024-09-26 RX ORDER — TRAMADOL HYDROCHLORIDE 50 MG/1
50 TABLET, COATED ORAL
Qty: 42 | Refills: 0 | Status: ACTIVE | COMMUNITY
Start: 2024-09-26 | End: 1900-01-01

## 2024-09-26 NOTE — PHYSICAL EXAM
[de-identified] : The patient appears well nourished and in no apparent distress.  The patient is alert and oriented to person, place, and time.   Affect and mood appear normal. The head is normocephalic and atraumatic.  The eyes reveal normal sclera and extra ocular muscles are intact. The tongue is midline with no apparent lesions.  Skin shows normal turgor with no evidence of eczema or psoriasis.  No respiratory distress noted.  Sensation grossly intact.		  [de-identified] : Exam of the right hip shows a well healed incision without warmth or erythema, hip external rotation of 40 degrees, hip internal rotation of 0 degrees. Patient can perform a straight leg raise with mild pain. Exam right knee: Skin WNL, no effusion, ROM 0-120 degrees of flexion. No tenderness to palpation. Extensor mechanism intact.  5/5 motor strength bilaterally distally. Sensation intact distally.	  [de-identified] : X-ray: AP of the pelvis and 2 views of the right hip demonstrate a right total hip arthroplasty in stable position, with no evidence of fracture, loosening, or dislocation. She has a healed right trochanteric osteotomy with hardware intact and no evidence of loosening. AP pelvis view shows moderate left hip joint space narrowing. There are broken screws noted distally that do not appear to be dislodged.   X-ray 4 views right knee remonstrate advanced patellofemoral OA, no signs of fracture.

## 2024-09-26 NOTE — HISTORY OF PRESENT ILLNESS
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 She underwent bone grafting and revision surgery for this. Patient had a fall one week ago in which she fell down one step and landed on the right knee, but "torked" the right hip. She since had had right knee pain anteriorly and increased hip pain to the groin as well as proximal anterior thigh.

## 2024-09-26 NOTE — DISCUSSION/SUMMARY
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 She underwent bone grafting and revision surgery for this. On imaging the patient's implants appear stable and well-fixed without signs of loosening despite distal cracked screws. There are no signs of recurrent infection, however patient was sent for ESR and CRP to be sure. Patient declined a right knee steroid injection today. A script for Tramadol was provided. Follow up in 4 weeks if no improvement.

## 2024-10-10 NOTE — HISTORY OF PRESENT ILLNESS
97
[de-identified] : Ms. SUSY CHOUDHURY is a 62 year old female status post revision right THR on 5/2/22.  She then underwent open reduction internal fixation of a right trochanteric osteotomy nonunion which was complicated by Pseudomonas infection.  She was treated with a PICC line IV antibiotics followed by oral suppression but due to intolerance secondary to joint pain the oral suppression has been discontinued as of 1 month ago.  Patient presents for follow up evaluation with some right groin pain and right thigh pain. The groin pain is intermittent with deep ROM. Patient states she recently had a falling out with her ID doctor and switched doctors. She was on levaquin daily, then switched to ciprofloxacin, and now has been off all antibiotics for one month. She denies any erythema of the hip/leg, and denies any warmth. No fevers or chills. No falls or truama. Due to the lack of alternative oral suppressive antibiotics for her organism, there was no other option for oral suppression.  Since stopping the antibiotic she does not report any fevers and no increase in pain around the hip.  She denies any issues with regard to the incision.  She would like to start outpatient physical therapy.  As a separate issue she also notes left hip pain in the groin radiating to the lateral and posterior buttock.

## 2024-11-20 NOTE — PHYSICAL THERAPY INITIAL EVALUATION ADULT - PATIENT PROFILE REVIEW, REHAB EVAL
[yes

## 2025-03-07 ENCOUNTER — NON-APPOINTMENT (OUTPATIENT)
Age: 65
End: 2025-03-07

## 2025-03-07 ENCOUNTER — APPOINTMENT (OUTPATIENT)
Dept: ORTHOPEDIC SURGERY | Facility: CLINIC | Age: 65
End: 2025-03-07
Payer: MEDICARE

## 2025-03-07 VITALS — WEIGHT: 200 LBS | HEIGHT: 65 IN | BODY MASS INDEX: 33.32 KG/M2

## 2025-03-07 DIAGNOSIS — M54.16 RADICULOPATHY, LUMBAR REGION: ICD-10-CM

## 2025-03-07 DIAGNOSIS — M16.12 UNILATERAL PRIMARY OSTEOARTHRITIS, LEFT HIP: ICD-10-CM

## 2025-03-07 DIAGNOSIS — M25.552 PAIN IN LEFT HIP: ICD-10-CM

## 2025-03-07 PROCEDURE — 73562 X-RAY EXAM OF KNEE 3: CPT | Mod: LT

## 2025-03-07 PROCEDURE — 99205 OFFICE O/P NEW HI 60 MIN: CPT

## 2025-03-07 PROCEDURE — 72170 X-RAY EXAM OF PELVIS: CPT

## 2025-03-12 ENCOUNTER — OUTPATIENT (OUTPATIENT)
Dept: OUTPATIENT SERVICES | Facility: HOSPITAL | Age: 65
LOS: 1 days | End: 2025-03-12
Payer: MEDICARE

## 2025-03-12 ENCOUNTER — RESULT REVIEW (OUTPATIENT)
Age: 65
End: 2025-03-12

## 2025-03-12 ENCOUNTER — APPOINTMENT (OUTPATIENT)
Dept: RADIOLOGY | Facility: CLINIC | Age: 65
End: 2025-03-12
Payer: MEDICARE

## 2025-03-12 DIAGNOSIS — Z98.890 OTHER SPECIFIED POSTPROCEDURAL STATES: Chronic | ICD-10-CM

## 2025-03-12 DIAGNOSIS — Z96.649 PRESENCE OF UNSPECIFIED ARTIFICIAL HIP JOINT: Chronic | ICD-10-CM

## 2025-03-12 DIAGNOSIS — M16.12 UNILATERAL PRIMARY OSTEOARTHRITIS, LEFT HIP: ICD-10-CM

## 2025-03-12 DIAGNOSIS — Z96.641 PRESENCE OF RIGHT ARTIFICIAL HIP JOINT: Chronic | ICD-10-CM

## 2025-03-12 DIAGNOSIS — Z98.891 HISTORY OF UTERINE SCAR FROM PREVIOUS SURGERY: Chronic | ICD-10-CM

## 2025-03-12 PROCEDURE — 27093 INJECTION FOR HIP X-RAY: CPT

## 2025-03-12 PROCEDURE — 73525 CONTRAST X-RAY OF HIP: CPT | Mod: 26,LT

## 2025-03-12 PROCEDURE — 73525 CONTRAST X-RAY OF HIP: CPT

## 2025-03-12 PROCEDURE — 27093 INJECTION FOR HIP X-RAY: CPT | Mod: LT

## 2025-03-28 ENCOUNTER — APPOINTMENT (OUTPATIENT)
Dept: OBGYN | Facility: CLINIC | Age: 65
End: 2025-03-28
Payer: MEDICARE

## 2025-03-28 PROCEDURE — 76830 TRANSVAGINAL US NON-OB: CPT | Mod: 59

## 2025-03-28 PROCEDURE — 99202 OFFICE O/P NEW SF 15 MIN: CPT | Mod: 25

## 2025-03-28 PROCEDURE — 58100 BIOPSY OF UTERUS LINING: CPT

## 2025-03-28 PROCEDURE — 99459 PELVIC EXAMINATION: CPT

## 2025-08-11 ENCOUNTER — NON-APPOINTMENT (OUTPATIENT)
Age: 65
End: 2025-08-11

## 2025-08-12 ENCOUNTER — APPOINTMENT (OUTPATIENT)
Dept: ORTHOPEDIC SURGERY | Facility: CLINIC | Age: 65
End: 2025-08-12

## 2025-08-12 DIAGNOSIS — M17.11 UNILATERAL PRIMARY OSTEOARTHRITIS, RIGHT KNEE: ICD-10-CM

## 2025-08-12 PROCEDURE — 99213 OFFICE O/P EST LOW 20 MIN: CPT

## 2025-08-13 ENCOUNTER — APPOINTMENT (OUTPATIENT)
Dept: ORTHOPEDIC SURGERY | Facility: CLINIC | Age: 65
End: 2025-08-13

## 2025-08-14 LAB
CRP SERPL-MCNC: <3 MG/L
ERYTHROCYTE [SEDIMENTATION RATE] IN BLOOD BY WESTERGREN METHOD: 28 MM/HR

## 2025-08-20 ENCOUNTER — APPOINTMENT (OUTPATIENT)
Dept: ORTHOPEDIC SURGERY | Facility: CLINIC | Age: 65
End: 2025-08-20

## 2025-08-21 ENCOUNTER — APPOINTMENT (OUTPATIENT)
Dept: ORTHOPEDIC SURGERY | Facility: CLINIC | Age: 65
End: 2025-08-21
Payer: MEDICARE

## 2025-08-21 DIAGNOSIS — Z96.641 PRESENCE OF RIGHT ARTIFICIAL HIP JOINT: ICD-10-CM

## 2025-08-21 DIAGNOSIS — M16.12 UNILATERAL PRIMARY OSTEOARTHRITIS, LEFT HIP: ICD-10-CM

## 2025-08-21 PROCEDURE — 99215 OFFICE O/P EST HI 40 MIN: CPT

## 2025-08-28 ENCOUNTER — APPOINTMENT (OUTPATIENT)
Dept: ULTRASOUND IMAGING | Facility: CLINIC | Age: 65
End: 2025-08-28
Payer: MEDICARE

## 2025-08-28 ENCOUNTER — RESULT REVIEW (OUTPATIENT)
Age: 65
End: 2025-08-28

## 2025-08-28 ENCOUNTER — OUTPATIENT (OUTPATIENT)
Dept: OUTPATIENT SERVICES | Facility: HOSPITAL | Age: 65
LOS: 1 days | End: 2025-08-28
Payer: MEDICARE

## 2025-08-28 DIAGNOSIS — Z98.890 OTHER SPECIFIED POSTPROCEDURAL STATES: Chronic | ICD-10-CM

## 2025-08-28 DIAGNOSIS — Z96.641 PRESENCE OF RIGHT ARTIFICIAL HIP JOINT: ICD-10-CM

## 2025-08-28 DIAGNOSIS — Z96.641 PRESENCE OF RIGHT ARTIFICIAL HIP JOINT: Chronic | ICD-10-CM

## 2025-08-28 DIAGNOSIS — Z98.891 HISTORY OF UTERINE SCAR FROM PREVIOUS SURGERY: Chronic | ICD-10-CM

## 2025-08-28 DIAGNOSIS — Z96.649 PRESENCE OF UNSPECIFIED ARTIFICIAL HIP JOINT: Chronic | ICD-10-CM

## 2025-08-28 PROCEDURE — 20611 DRAIN/INJ JOINT/BURSA W/US: CPT | Mod: RT

## 2025-08-28 PROCEDURE — 20611 DRAIN/INJ JOINT/BURSA W/US: CPT

## 2025-09-09 ENCOUNTER — NON-APPOINTMENT (OUTPATIENT)
Age: 65
End: 2025-09-09

## (undated) DEVICE — SPRAY RADIAL IM TIP

## (undated) DEVICE — FLUENT FMS PROCEDURE KIT

## (undated) DEVICE — HOOD FLYTE STRYKER SURGICOOL W PEELAWAY

## (undated) DEVICE — SPONGE RAYTEC 4X4 16PLY

## (undated) DEVICE — WAVEGUIDE ILLUMINATOR EIGR SABER W YANKAUER TAPERED TIP

## (undated) DEVICE — NDL 1/2 TROCAR MAYO CATGUT

## (undated) DEVICE — DRAPE C ARM UNIVERSAL

## (undated) DEVICE — SUT VICRYL 2-0 27" CT-1 UNDYED

## (undated) DEVICE — BLANKET WARMER UPPER ADULT

## (undated) DEVICE — SOL IRR POUR H2O 1000ML

## (undated) DEVICE — DRSG TELFA 3 X 4

## (undated) DEVICE — DRAPE TOWEL 1000 SMALL 17" X 11"

## (undated) DEVICE — SUT VICRYL 0 27" CT

## (undated) DEVICE — PACK EXTREMITY SOUTHSIDE

## (undated) DEVICE — DRILL BIT MEDTRONIC 5.0MM PLATINIUM

## (undated) DEVICE — DRILL BIT J&J SYNTHES TRAUMA QC 2.8X110MM

## (undated) DEVICE — DRILL BIT SYNTHES ORTHO 4.3MM

## (undated) DEVICE — SOL IRR POUR NS 0.9% 1000ML

## (undated) DEVICE — SUT MONOCRYL 4-0 27" PS-2 UNDYED

## (undated) DEVICE — WRAP COMPRESSION CALF MED

## (undated) DEVICE — GLV COTTON LG STERILE

## (undated) DEVICE — SUT VICRYL 3-0 18" PS-2 UNDYED

## (undated) DEVICE — NDL HYPO SAFE 22G X 1.5"

## (undated) DEVICE — SOL INJ NS 0.9% 250ML

## (undated) DEVICE — SUT VICRYL 0 18" CT-1 UNDYED

## (undated) DEVICE — ELCTR GROUNDING PAD ADULT COVIDIEN

## (undated) DEVICE — DRSG TEGADERM 6"X8"

## (undated) DEVICE — FOLEY TRAY 16FR 5CC LF UMETER CLOSED

## (undated) DEVICE — SUT DERMABOND 0.7ML

## (undated) DEVICE — SYR LUER LOK 30CC

## (undated) DEVICE — GLV 8 PROTEXIS

## (undated) DEVICE — SEALER BIPOLAR 6.0 AQUAMANTYS

## (undated) DEVICE — SUT ETHIBOND 2 30" V37

## (undated) DEVICE — Device

## (undated) DEVICE — NDL HYPO REGULAR BEVEL 25G X 1.5"

## (undated) DEVICE — SOL IRR POUR NS 0.9% 500ML

## (undated) DEVICE — DISSECTING TOOL MIDAS REX 8CM 2.3MM

## (undated) DEVICE — GLV 7.5 PROTEXIS

## (undated) DEVICE — DRAPE HALF SHEET 40X57"

## (undated) DEVICE — PACK LITHOTOMY

## (undated) DEVICE — GLV 8 ESTEEM BLUE

## (undated) DEVICE — ELCTR EDGE BOVIE COATED BLADE TIP 4"

## (undated) DEVICE — MYOSURE SCOPE SEAL

## (undated) DEVICE — SUT MONOCRYL 2-0 27" SH UNDYED

## (undated) DEVICE — DRAPE SPLIT SHEETS 77X108"

## (undated) DEVICE — ZIMMER MIXING BOWL

## (undated) DEVICE — TUBING STRYKER HYSTEROSCOPY INFLOW OUTFLOW

## (undated) DEVICE — SUT HEWSON RETRIEVER

## (undated) DEVICE — DRAPE U LONG 47X70"

## (undated) DEVICE — DRAPE 3/4 SHEET 52X76"

## (undated) DEVICE — SUT NYLON 3-0 18" PS-2

## (undated) DEVICE — KT PULSAVAC WOUND W/ SPRAYTIP

## (undated) DEVICE — DRAPE MAYO STAND 23"

## (undated) DEVICE — DRAPE LIGHT HANDLE COVER (GREEN)

## (undated) DEVICE — SUT VICRYL 0 27" CT-1

## (undated) DEVICE — DRAPE IOBAN 23X33"

## (undated) DEVICE — TAPE SILK 3"

## (undated) DEVICE — PACK TOTAL HIP CUST

## (undated) DEVICE — WARMING BLANKET UPPER ADULT

## (undated) DEVICE — DRAPE IOBAN 17X23"

## (undated) DEVICE — SUT VICRYL PLUS 0 27" CT-2 UNDYED

## (undated) DEVICE — SOL IRR GLYCINE 1.5% 3000L

## (undated) DEVICE — SAW BLADE ZIMMER RECIPROCATING SINGLE SIDED 10X70X1.19MM

## (undated) DEVICE — POSITIONER FOAM EGG CRATE ULNAR (2PCS)

## (undated) DEVICE — GLV 7 PROTEXIS (WHITE)